# Patient Record
Sex: MALE | Race: WHITE | NOT HISPANIC OR LATINO | Employment: FULL TIME | URBAN - METROPOLITAN AREA
[De-identification: names, ages, dates, MRNs, and addresses within clinical notes are randomized per-mention and may not be internally consistent; named-entity substitution may affect disease eponyms.]

---

## 2017-07-05 ENCOUNTER — TRANSFERRED RECORDS (OUTPATIENT)
Dept: HEALTH INFORMATION MANAGEMENT | Facility: CLINIC | Age: 32
End: 2017-07-05

## 2017-09-08 ASSESSMENT — ENCOUNTER SYMPTOMS
JOINT SWELLING: 0
BACK PAIN: 0
MUSCLE WEAKNESS: 0
MUSCLE CRAMPS: 0
STIFFNESS: 0
MYALGIAS: 0
NECK PAIN: 0
POOR WOUND HEALING: 0
NAIL CHANGES: 1
ARTHRALGIAS: 1
SKIN CHANGES: 0

## 2017-09-19 ENCOUNTER — OFFICE VISIT (OUTPATIENT)
Dept: INTERNAL MEDICINE | Facility: CLINIC | Age: 32
End: 2017-09-19

## 2017-09-19 VITALS
SYSTOLIC BLOOD PRESSURE: 115 MMHG | HEART RATE: 59 BPM | BODY MASS INDEX: 23.64 KG/M2 | HEIGHT: 70 IN | WEIGHT: 165.1 LBS | DIASTOLIC BLOOD PRESSURE: 71 MMHG

## 2017-09-19 DIAGNOSIS — B65.9 SCHISTOSOMA: ICD-10-CM

## 2017-09-19 DIAGNOSIS — M77.11 LATERAL EPICONDYLITIS OF RIGHT ELBOW: ICD-10-CM

## 2017-09-19 DIAGNOSIS — Z00.00 PREVENTATIVE HEALTH CARE: Primary | ICD-10-CM

## 2017-09-19 DIAGNOSIS — B00.1 RECURRENT COLD SORES: ICD-10-CM

## 2017-09-19 DIAGNOSIS — Z00.00 PREVENTATIVE HEALTH CARE: ICD-10-CM

## 2017-09-19 LAB
ALBUMIN SERPL-MCNC: 4.4 G/DL (ref 3.4–5)
ALP SERPL-CCNC: 71 U/L (ref 40–150)
ALT SERPL W P-5'-P-CCNC: 37 U/L (ref 0–70)
ANION GAP SERPL CALCULATED.3IONS-SCNC: 8 MMOL/L (ref 3–14)
AST SERPL W P-5'-P-CCNC: 24 U/L (ref 0–45)
BILIRUB SERPL-MCNC: 0.9 MG/DL (ref 0.2–1.3)
BUN SERPL-MCNC: 26 MG/DL (ref 7–30)
CALCIUM SERPL-MCNC: 8.8 MG/DL (ref 8.5–10.1)
CHLORIDE SERPL-SCNC: 104 MMOL/L (ref 94–109)
CO2 SERPL-SCNC: 27 MMOL/L (ref 20–32)
CREAT SERPL-MCNC: 1.25 MG/DL (ref 0.66–1.25)
ERYTHROCYTE [DISTWIDTH] IN BLOOD BY AUTOMATED COUNT: 12.5 % (ref 10–15)
GFR SERPL CREATININE-BSD FRML MDRD: 66 ML/MIN/1.7M2
GLUCOSE SERPL-MCNC: 103 MG/DL (ref 70–99)
HCT VFR BLD AUTO: 40.4 % (ref 40–53)
HGB BLD-MCNC: 14.2 G/DL (ref 13.3–17.7)
MCH RBC QN AUTO: 31.1 PG (ref 26.5–33)
MCHC RBC AUTO-ENTMCNC: 35.1 G/DL (ref 31.5–36.5)
MCV RBC AUTO: 88 FL (ref 78–100)
PLATELET # BLD AUTO: 166 10E9/L (ref 150–450)
POTASSIUM SERPL-SCNC: 3.7 MMOL/L (ref 3.4–5.3)
PROT SERPL-MCNC: 8.1 G/DL (ref 6.8–8.8)
RBC # BLD AUTO: 4.57 10E12/L (ref 4.4–5.9)
SODIUM SERPL-SCNC: 139 MMOL/L (ref 133–144)
TSH SERPL DL<=0.005 MIU/L-ACNC: 1.29 MU/L (ref 0.4–4)
WBC # BLD AUTO: 7.4 10E9/L (ref 4–11)

## 2017-09-19 RX ORDER — PRAZIQUANTEL 600 MG/1
1200 TABLET, FILM COATED ORAL 3 TIMES DAILY
Qty: 6 TABLET | Refills: 0 | Status: SHIPPED | OUTPATIENT
Start: 2017-09-19 | End: 2017-09-20

## 2017-09-19 RX ORDER — VALACYCLOVIR HYDROCHLORIDE 1 G/1
TABLET, FILM COATED ORAL
Qty: 16 TABLET | Refills: 0 | Status: SHIPPED | OUTPATIENT
Start: 2017-09-19 | End: 2018-05-22

## 2017-09-19 ASSESSMENT — PAIN SCALES - GENERAL: PAINLEVEL: NO PAIN (1)

## 2017-09-19 NOTE — PATIENT INSTRUCTIONS
[facility]: [phone]     [facility] Medication Refill Request Information:  * Please contact your pharmacy regarding ANY request for medication refills.  ** [facility] Prescription Fax = [phone]  * Please allow 3 business days for routine medication refills.  * Please allow 5 business days for controlled substance medication refills.     [facility] Test Result notification information:  *You will be notified with in 7-10 days of your appointment day regarding the results of your test.  If you are on MyChart you will be notified as soon as the provider has reviewed the results and signed off on them.

## 2017-09-19 NOTE — MR AVS SNAPSHOT
After Visit Summary   9/19/2017    Justo Cardoza    MRN: [id number]           Patient Information     Date Of Birth          1985        Visit Information        Provider Department      9/19/2017 1:05 PM Ashlyn Mcdowell MD [facility]        Today's Diagnoses     Preventative health care    -  1    Schistosoma        Lateral epicondylitis of right elbow        Recurrent cold sores          Care Instructions    Primary Care Center: [phone]     Primary Care Center Medication Refill Request Information:  * Please contact your pharmacy regarding ANY request for medication refills.  ** [facility] Prescription Fax = [phone]  * Please allow 3 business days for routine medication refills.  * Please allow 5 business days for controlled substance medication refills.     Primary Care Center Test Result notification information:  *You will be notified with in 7-10 days of your appointment day regarding the results of your test.  If you are on MyChart you will be notified as soon as the provider has reviewed the results and signed off on them.            Follow-ups after your visit        Additional Services     PHYSICAL THERAPY REFERRAL       *This therapy referral will be filtered to a centralized scheduling office at [facility] and the patient will receive a call to schedule an appointment at a [place] location most convenient for them. *     [facility] provides Physical Therapy evaluation and treatment and many specialty services across the [place] system.  If requesting a specialty program, please choose from the list below.    If you have not heard from the scheduling office within 2 business days, please call [phone] for all locations, with the exception of [place], please call [phone].  Treatment: Evaluation & Treatment  Special Instructions/Modalities: physical therapy for tennis elbow.   Special Programs: none    Please  "be aware that coverage of these services is subject to the terms and limitations of your health insurance plan.  Call member services at your health plan with any benefit or coverage questions.      **Note to Provider:  If you are referring outside of [place] for the therapy appointment, please list the name of the location in the \"special instructions\" above, print the referral and give to the patient to schedule the appointment.                  Your next 10 appointments already scheduled     Sep 19, 2017  2:45 PM CDT   LAB with  LAB   [facility] ([facility])    [address] [place] [id number] [phone]           Patient must bring picture ID. Patient should be prepared to give a urine specimen  Please do not eat 10-12 hours before your appointment if you are coming in fasting for labs on lipids, cholesterol, or glucose (sugar). Pregnant women should follow their Care Team instructions. Water with medications is okay. Do not drink coffee or other fluids. If you have concerns about taking  your medications, please ask at office or if scheduling via [employer], send a message by clicking on Secure Messaging, Message Your Care Team.              Future tests that were ordered for you today     Open Future Orders        Priority Expected Expires Ordered    CBC with platelets Routine 9/19/2017 10/3/2017 9/19/2017    Comprehensive metabolic panel Routine 9/19/2017 10/3/2017 9/19/2017    TSH with free T4 reflex Routine 9/19/2017 10/3/2017 9/19/2017            Who to contact     Please call your clinic at [phone] to:    Ask questions about your health    Make or cancel appointments    Discuss your medicines    Learn about your test results    Speak to your doctor   If you have compliments or concerns about an experience at your clinic, or if you wish to file a complaint, please contact [facility] Patient Relations at [phone] or " "email us at [email or web address]         Additional Information About Your Visit        [facility] Information     YourTeamOnline gives you secure access to your electronic health record. If you see a primary care provider, you can also send messages to your care team and make appointments. If you have questions, please call your primary care clinic.  If you do not have a primary care provider, please call [phone] and they will assist you.      YourTeamOnline is an electronic gateway that provides easy, online access to your medical records. With YourTeamOnline, you can request a clinic appointment, read your test results, renew a prescription or communicate with your care team.     To access your existing account, please contact your [facility] or call [phone] for assistance.        Care EveryWhere ID     This is your Care EveryWhere ID. This could be used by other organizations to access your [place] medical records  [id number]        Your Vitals Were     Pulse Height BMI (Body Mass Index)             59 1.778 m (5' 10\") 23.69 kg/m2          Blood Pressure from Last 3 Encounters:   09/19/17 115/71    Weight from Last 3 Encounters:   09/19/17 74.9 kg (165 lb 1.6 oz)   04/11/16 80.7 kg (178 lb)   07/29/15 81.6 kg (180 lb)              We Performed the Following     PHYSICAL THERAPY REFERRAL          Today's Medication Changes          These changes are accurate as of: 9/19/17  2:32 PM.  If you have any questions, ask your nurse or doctor.               Start taking these medicines.        Dose/Directions    praziquantel 600 MG tablet   Commonly known as:  BILTRICIDE   Used for:  Schistosoma   Started by:  Ashlyn Mcdowell MD        Dose:  1200 mg   Take 2 tablets (1,200 mg) by mouth 3 times daily for 1 day   Quantity:  6 tablet   Refills:  0       valACYclovir 1000 mg tablet   Commonly known as:  VALTREX   Used for:  Recurrent cold sores   Started by:  Ashlyn Mcdowell" MD        Take 2 tabs by mouth when symptoms of cold sores start, and 2 tabs 12 hours later, for total of 1 day of treatment.   Quantity:  16 tablet   Refills:  0            Where to get your medicines      These medications were sent to [place], [place] - [address]  [address], [facility] [address]    Hours:  TRANSPLANT PHONE NUMBER [phone] Phone:  [phone]     praziquantel 600 MG tablet    valACYclovir 1000 mg tablet                Primary Care Provider    None       No address on file        Equal Access to Services     [facility]: Hadii jess ku hadasho Soomaali, waaxda luqadaha, qaybta kaalmada adecharlesyajian, alexus soni . So [facility] [phone].    ATENCIÓN: Si habla español, tiene a ruiz disposición servicios gratuitos de asistencia lingüística. Llame al [phone].    We comply with applicable federal civil rights laws and [place] laws. We do not discriminate on the basis of race, color, national origin, age, disability sex, sexual orientation or gender identity.            Thank you!     Thank you for choosing [facility]  for your care. Our goal is always to provide you with excellent care. Hearing back from our patients is one way we can continue to improve our services. Please take a few minutes to complete the written survey that you may receive in the mail after your visit with us. Thank you!             Your Updated Medication List - Protect others around you: Learn how to safely use, store and throw away your medicines at www.disposemymeds.org.          This list is accurate as of: 9/19/17  2:32 PM.  Always use your most recent med list.                   Brand Name Dispense Instructions for use Diagnosis    praziquantel 600 MG tablet    BILTRICIDE    6 tablet    Take 2 tablets (1,200 mg) by mouth 3 times daily for 1 day    Schistosoma       SEROQUEL PO       Right ankle pain        valACYclovir 1000 mg tablet    VALTREX    16 tablet    Take 2 tabs by mouth when symptoms of cold sores start, and 2 tabs 12 hours later, for total of 1 day of treatment.    Recurrent cold sores

## 2017-09-19 NOTE — NURSING NOTE
Chief Complaint   Patient presents with     Establish Care     pt here to establish care     Toenail     pt states having a spot on the left big toe nail     Derm Problem     pt states his face is peeling     Medication Request     pt would like to discuss medications     Kanika Ojeda CMA at 1:04 PM on 9/19/2017.

## 2017-09-19 NOTE — PROGRESS NOTES
PRIMARY CARE CENTER         HPI:       Justo Cardoza is a 32 year old male who presents for the following  Patient presents with: Establish Care (pt here to establish care); Toenail (pt states having a spot on the left big toe nail); Derm Problem (pt states his face is peeling); and Medication Request (pt would like to discuss medications)    Justo presents to establish care, and for multiple complaints- including lesion on his toe, exposure to schistosoma while swimming in [place], dry flaky skin on face, elbow pain, and med refill for cold sores.     The toe lesion is on his left big toe and he noticed it about 1.5 weeks ago. It is dark, 1cm in size, and has grown out with the nail. It's non-tender and non-palpable, and he does not remember any trauma to the area.     Regarding his travel, he has been to [facility] for research many times over the past decade, and he swam in [facility]. In the past he has always gotten prophylactic treatment in [place] for schistosomiasis, however the pharmacy had run out of the medication so he was hoping to get it here. He has no urinary symptoms o GI symptoms at all- no abodminal pain, diarrhea, changes in BMs, blood in stool, blood in urine, pain with urination or frequency.     He also complains of dry skin on the sides of his nose which is flaky for the past couple of months, with some redness on the base especially after sleep. He does not have any dandruff, uses head and shoulders shampoo but only on his hair not on face. He has been using a lotion for that region with some relief of flaking. No pain in that region.     He complains of pain in his right elbow which is worse when he plays squash and tennis. He has tried ice and heat occasionally, with  Moderate relief. He also bought a brace which does not seem to be helping. The pain does not radiate, seems to be made worse with certain movements. He stopped playing squash and tennis for the time being because they seem to  "aggravate his pain the most, but still plays basketball. Location is the lateral aspect of the elbow.     He also has cold sores and usually uses a cream, and was hoping to get a new prescription as his old one ran out. The cold sores occur twice a year on average, and are triggered by stress.     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     Surgical history includes hydrocele removal, appendectomy, hernia repair (right inguinal)     Family history: neg for diabetes. Heart disease in paternal grandfather, prostate cancer in paternal grandfather, hypertension in mother, and skin cancer in some relatives.     Problem, Medication and Allergy Lists were reviewed and are current.  Patient is a new patient to this clinic and so  I reviewed/updated the Past Medical History, the Family History and the Social History.          Review of Systems:   ROS  I have personally reviewed and updated the complete ROS on the day of the visit. \  HPI has pertinent positives, and additional 10 point ROS is negative.           Physical Exam:   /71  Pulse 59  Ht 1.778 m (5' 10\")  Wt 74.9 kg (165 lb 1.6 oz)  BMI 23.69 kg/m2  Body mass index is 23.69 kg/(m^2).  Vitals were reviewed       GENERAL APPEARANCE: healthy, alert and no distress     EYES: EOMI,  PERRL     NECK: no adenopathy, no asymmetry, masses, or scars and thyroid normal to palpation     RESP: lungs clear to auscultation - no rales, rhonchi or wheezes     CV: regular rates and rhythm, normal S1 S2, no S3 or S4 and no murmur, click or rub     ABDOMEN:  soft, nontender, no HSM or masses and bowel sounds normal     MS: extremities normal- no gross deformities noted, no evidence of inflammation in joints, FROM in all extremities. Tenderness on extension of wrist against resistance in the lateral epicondyle region.      SKIN: dark, 1cm horizontal lesion on toenail of left big toe.     "  PSYCH: mentation appears normal. Decreased eye contact.     Assessment and Plan     Justo was seen today for establish care, toenail, derm problem and medication request.    Diagnoses and all orders for this visit:    Preventative health care  -     CBC with platelets; Future  -     Comprehensive metabolic panel; Future  -     TSH with free T4 reflex; Future    Schistosoma- patient understood that treatment without known or confirmed disease carries its own risks, and he requests treatment. He would have gotten it in [place] if the pharmacy had it in stock, and he got it multiple times with no side effects.   -     praziquantel (BILTRICIDE) 600 MG tablet; Take 2 tablets (1,200 mg) by mouth 3 times daily for 1 day    Lateral epicondylitis of right elbow- physical exam is consistent with tennis elbow, as is patient history.   -     PHYSICAL THERAPY REFERRAL  - Traction brace provided  - Recommended ice and heat, along with 7-10 days of ibuprofen 400mg BID.     Recurrent cold sores- no current symptoms of cold sores, but told patient there is more evidence for PO antiviral rather than topical, and patient would prefer to try that.   -     valACYclovir (VALTREX) 1000 mg tablet; Take 2 tabs by mouth when symptoms of cold sores start, and 2 tabs 12 hours later, for total of 1 day of treatment.    #Toenail hematoma- lesion on toe is growing with the toenail, and I counseled the patient that despite no obvious trauma, this is most consistent with a blood collection under the toenail and will grow out with growth of the nail. He took a photo of the nail and will monitor it's progress, I explained it should grow out in 2 months.   - continue to montor    #Dry skin on face- although it doesn't look very severe today, per the history this could be consistent with some mild seborrheic dermatitis.   - Recommended to use head and shoulders shampoo on face as well  - Continue to use moisturizing lotion but not one with an alcohol  base.     Options for treatment and follow-up care were reviewed with the patient. Justo Cardoza engaged in the decision making process and verbalized understanding of the options discussed and agreed with the final plan.    Ashlyn Mcdowell MD  Sep 19, 2017    Pt was seen and plan of care discussed with Dr. Pimetnel    Attending Addendum:  Patient seen and examined with resident in clinic today.  Pertinent portions of history and exam were independently verified by myself.  I agree with the exam and plan as outlined above with the following modifications: none  Emilee Pimentel MD  Internal Medicine

## 2017-09-27 ENCOUNTER — THERAPY VISIT (OUTPATIENT)
Dept: PHYSICAL THERAPY | Facility: CLINIC | Age: 32
End: 2017-09-27
Payer: COMMERCIAL

## 2017-09-27 DIAGNOSIS — M77.11 LATERAL EPICONDYLITIS OF RIGHT ELBOW: Primary | ICD-10-CM

## 2017-09-27 PROCEDURE — 97110 THERAPEUTIC EXERCISES: CPT | Mod: GP | Performed by: PHYSICAL THERAPIST

## 2017-09-27 PROCEDURE — 97161 PT EVAL LOW COMPLEX 20 MIN: CPT | Mod: GP | Performed by: PHYSICAL THERAPIST

## 2017-09-27 NOTE — MR AVS SNAPSHOT
After Visit Summary   9/27/2017    Justo Cardoza    MRN: [id number]           Patient Information     Date Of Birth          1985        Visit Information        Provider Department      9/27/2017 9:30 AM Gita Jenkins, PT [facility]        Today's Diagnoses     Lateral epicondylitis of right elbow    -  1       Follow-ups after your visit        Your next 10 appointments already scheduled     Oct 04, 2017  4:10 PM CDT   ERIK Extremity with Chavez Xie PT   [facility] ([facility])    [address] [place] [id number] [phone]            Oct 11, 2017 11:00 AM CDT   ERIK Extremity with Chavez Xie PT   [facility] ([facility])    [address] [place] [id number] [phone]              Who to contact     If you have questions or need follow up information about today's clinic visit or your schedule please contact [facility] [facility] directly at [phone].  Normal or non-critical lab and imaging results will be communicated to you by [employer], letter or phone within 4 business days after the clinic has received the results. If you do not hear from us within 7 days, please contact the clinic through [employer] or phone. If you have a critical or abnormal lab result, we will notify you by phone as soon as possible.  Submit refill requests through tradeNOW or call your pharmacy and they will forward the refill request to us. Please allow 3 business days for your refill to be completed.          Additional Information About Your Visit        [employer] Information     tradeNOW gives you secure access to your electronic health record. If you see a primary care provider, you can also send messages to your care team and make appointments. If you have questions, please call your primary care clinic.  If you do not have  a primary care provider, please call [phone] and they will assist you.        Care EveryWhere ID     This is your Care EveryWhere ID. This could be used by other organizations to access your [place] medical records  [id number]         Blood Pressure from Last 3 Encounters:   09/19/17 115/71    Weight from Last 3 Encounters:   09/19/17 74.9 kg (165 lb 1.6 oz)   04/11/16 80.7 kg (178 lb)   07/29/15 81.6 kg (180 lb)              We Performed the Following     HC PT EVAL, LOW COMPLEXITY     ERIK INITIAL EVAL REPORT     THERAPEUTIC EXERCISES        Primary Care Provider    None Specified       No primary provider on file.        Equal Access to Services     [facility] : Hadii jess Mcfarland, myriam rios, sarahi martin, alexus soni . So [facility] [phone].    ATENCIÓN: Si habla español, tiene a ruiz disposición servicios gratuitos de asistencia lingüística. Llame al [phone].    We comply with applicable federal civil rights laws and [place] laws. We do not discriminate on the basis of race, color, national origin, age, disability sex, sexual orientation or gender identity.            Thank you!     Thank you for choosing [place] [facility] [place]  for your care. Our goal is always to provide you with excellent care. Hearing back from our patients is one way we can continue to improve our services. Please take a few minutes to complete the written survey that you may receive in the mail after your visit with us. Thank you!             Your Updated Medication List - Protect others around you: Learn how to safely use, store and throw away your medicines at www.disposemymeds.org.          This list is accurate as of: 9/27/17 11:17 AM.  Always use your most recent med list.                   Brand Name Dispense Instructions for use Diagnosis    SEROQUEL PO       Right ankle pain       valACYclovir 1000 mg tablet    VALTREX    16 tablet    Take  2 tabs by mouth when symptoms of cold sores start, and 2 tabs 12 hours later, for total of 1 day of treatment.    Recurrent cold sores

## 2017-09-27 NOTE — PROGRESS NOTES
Subjective:    Patient is a 32 year old male presenting with rehab right elbow hpi.   Justo Cardoza is a 32 year old male with a right elbow condition.  Condition occurred with:  Repetition/overuse.  Condition occurred: during recreation/sport.  This is a chronic condition  May 2017 - onset of right elbow pain after playing tennis and squash. Has been taking it easy from that since it started. Still tries to play basketball and lift but that can sometimes hurt. Has tried elbow braces but do not like them as they restrict his elbow flexion.    Patient reports pain:  Lateral epicondyle.  Radiates to: none.  Pain is described as stabbing and is intermittent and reported as 7/10 (at worst).  Associated symptoms:  Loss of strength (in wrist). Pain is the same all the time.  Symptoms are exacerbated by other (playing tennis/squash, lifting bag out to the side with elbow extended, basketball, wrist extension) and relieved by NSAID's and other (wearing compression sleeve).  Since onset symptoms are gradually improving (but fluctuates).  Special testing: none.  Previous treatment: none.  Improvement with previous treatment: NA.  General health as reported by patient is excellent.                                              Objective:    Standing Alignment:    Cervical/Thoracic:  Forward head  Shoulder/UE:  Rounded shoulders (poor sitting posture)                  Flexibility/Screens:     Upper Extremity:        Decreased right upper extremity flexibility present at:  Wrist Extensors                           Shoulder Evaluation:  ROM:  AROM:    Flexion:  Left:  WNL    Right:  WNL    Abduction:  Left: WNL   Right:  WNL      External Rotation:  Left:  WNL    Right:  WNL            Extension/Internal Rotation:  Left:  T5    Right:  T7          Strength:  : scaption 5/5. ER at 90/90: L 5/5, R 5-/5. IR at 90/90 5/5 bilat. Mid Trap: L 5-/5, R 4+/5. Low Trap: L 4+/5, R 4/5.  Flexion: Left:5/5   Pain:    Right: 5/5     Pain:      Abduction:  Left: 5/5  Pain:    Right: 5/5     Pain:    Internal Rotation:  Left:5/5     Pain:    Right: 5/5     Pain:  External Rotation:   Left:5/5     Pain:   Right:5-/5     Pain:                       ROM:          Strength:    Flexion Elbow:  Left: 5/5 Pain:    Right: 5/5 Pain:  Extension Elbow: Left: 5/5 Pain:    Right: 5/5 Pain:  Flexion Wrist:  Left: 5/5 Pain:    Right: 5/5 Pain:  Extension Wrist: Left: 5/5 Pain:  Right: 4+/5  Pain:++  Supination Elbow/Wrist: Left: 5/5 Pain:    Right: 5/5 Pain:  Pronation Elbow/Wrist: Left: 5/5 Pain:        Right: 5/5 Pain:  Radial Deviation:  Left: 5/5 Pain:    Right: 5-/5  Pain:-  Ulnar Deviation: Left: 5/5 Pain:    Right: 5/5 Pain:    Special Testing:      Right wrist/elbow positive for the following special tests: Lateral Epicondylitis  Palpation:  Palpation elbow/wrist: tenderness at proximal wrist extensor wad and tendon unit, down into mid portion of extensors.    Right wrist/elbow tenderness present at:Wrist ExtensorsRight wrist/elbow tenderness not present at:Lateral Epicondyle                                General     ROS    Assessment/Plan:      Patient is a 32 year old male with left side elbow complaints.    Patient has the following significant findings with corresponding treatment plan.                Diagnosis 1:  Right lateral epicondylitis    Pain -  hot/cold therapy, manual therapy, self management, education and home program  Decreased ROM/flexibility - manual therapy, therapeutic exercise and home program  Decreased strength - therapeutic exercise, therapeutic activities and home program  Impaired muscle performance - neuro re-education and home program  Decreased function - therapeutic activities and home program  Impaired posture - neuro re-education, therapeutic activities and home program    Therapy Evaluation Codes:   1) History comprised of:   Personal factors that impact the plan of care:      None.    Comorbidity factors that impact the plan  of care are:      None.     Medications impacting care: None.  2) Examination of Body Systems comprised of:   Body structures and functions that impact the plan of care:      Elbow.   Activity limitations that impact the plan of care are:      Lifting and Sports.  3) Clinical presentation characteristics are:   Stable/Uncomplicated.  4) Decision-Making    Low complexity using standardized patient assessment instrument and/or measureable assessment of functional outcome.  Cumulative Therapy Evaluation is: Low complexity.    Previous and current functional limitations:  (See Goal Flow Sheet for this information)    Short term and Long term goals: (See Goal Flow Sheet for this information)     Communication ability:  Patient appears to be able to clearly communicate and understand verbal and written communication and follow directions correctly.  Treatment Explanation - The following has been discussed with the patient:   RX ordered/plan of care  Anticipated outcomes  Possible risks and side effects  This patient would benefit from PT intervention to resume normal activities.   Rehab potential is good.    Frequency:  1 X week, once daily  Duration:  for 4 weeks tapering to 2 X a month over 8 weeks  Discharge Plan:  Achieve all LTG.  Independent in home treatment program.  Reach maximal therapeutic benefit.    Please refer to the daily flowsheet for treatment today, total treatment time and time spent performing 1:1 timed codes.

## 2017-09-28 NOTE — PROGRESS NOTES
Subjective:    Patient is a 32 year old male presenting with rehab left ankle/foot hpi.                    and reported as 2/10.                General health as reported by patient is excellent.  Pertinent medical history includes:  Mental illness.    Surgical history: Appendectomy, Hernia.    Current occupation is Professor .    Primary job tasks include:  Prolonged sitting (computer work).                                Objective:    System    Physical Exam    General     ROS    Assessment/Plan:

## 2017-10-04 ENCOUNTER — THERAPY VISIT (OUTPATIENT)
Dept: PHYSICAL THERAPY | Facility: CLINIC | Age: 32
End: 2017-10-04
Payer: COMMERCIAL

## 2017-10-04 DIAGNOSIS — M77.11 LATERAL EPICONDYLITIS OF RIGHT ELBOW: ICD-10-CM

## 2017-10-04 PROCEDURE — 97140 MANUAL THERAPY 1/> REGIONS: CPT | Mod: GP | Performed by: PHYSICAL THERAPIST

## 2017-10-04 PROCEDURE — 97112 NEUROMUSCULAR REEDUCATION: CPT | Mod: GP | Performed by: PHYSICAL THERAPIST

## 2017-10-04 PROCEDURE — 97110 THERAPEUTIC EXERCISES: CPT | Mod: GP | Performed by: PHYSICAL THERAPIST

## 2017-10-11 ENCOUNTER — THERAPY VISIT (OUTPATIENT)
Dept: PHYSICAL THERAPY | Facility: CLINIC | Age: 32
End: 2017-10-11
Payer: COMMERCIAL

## 2017-10-11 DIAGNOSIS — M77.11 LATERAL EPICONDYLITIS OF RIGHT ELBOW: ICD-10-CM

## 2017-10-11 PROCEDURE — 97035 APP MDLTY 1+ULTRASOUND EA 15: CPT | Mod: GP | Performed by: PHYSICAL THERAPIST

## 2017-10-11 PROCEDURE — 97110 THERAPEUTIC EXERCISES: CPT | Mod: GP | Performed by: PHYSICAL THERAPIST

## 2017-10-11 PROCEDURE — 97140 MANUAL THERAPY 1/> REGIONS: CPT | Mod: GP | Performed by: PHYSICAL THERAPIST

## 2017-10-25 ENCOUNTER — THERAPY VISIT (OUTPATIENT)
Dept: PHYSICAL THERAPY | Facility: CLINIC | Age: 32
End: 2017-10-25
Payer: COMMERCIAL

## 2017-10-25 DIAGNOSIS — M77.11 LATERAL EPICONDYLITIS OF RIGHT ELBOW: ICD-10-CM

## 2017-10-25 PROCEDURE — 97110 THERAPEUTIC EXERCISES: CPT | Mod: GP | Performed by: PHYSICAL THERAPIST

## 2017-10-25 NOTE — MR AVS SNAPSHOT
After Visit Summary   10/25/2017    Justo Cardoza    MRN: [id number]           Patient Information     Date Of Birth          1985        Visit Information        Provider Department      10/25/2017 3:30 PM Chavez Xie PT [facility] [employer] [facility]        Today's Diagnoses     Lateral epicondylitis of right elbow           Follow-ups after your visit        Who to contact     If you have questions or need follow up information about today's clinic visit or your schedule please contact [facility] [employer] directly at [phone].  Normal or non-critical lab and imaging results will be communicated to you by [employer], letter or phone within 4 business days after the clinic has received the results. If you do not hear from us within 7 days, please contact the clinic through [employer] or phone. If you have a critical or abnormal lab result, we will notify you by phone as soon as possible.  Submit refill requests through [employer] or call your pharmacy and they will forward the refill request to us. Please allow 3 business days for your refill to be completed.          Additional Information About Your Visit        MyChart Information     [employer] gives you secure access to your electronic health record. If you see a primary care provider, you can also send messages to your care team and make appointments. If you have questions, please call your primary care clinic.  If you do not have a primary care provider, please call [phone] and they will assist you.        Care EveryWhere ID     This is your Care EveryWhere ID. This could be used by other organizations to access your [facility] medical records  [id number]         Blood Pressure from Last 3 Encounters:   09/19/17 115/71    Weight from Last 3 Encounters:   09/19/17 74.9 kg (165 lb 1.6 oz)   04/11/16 80.7 kg (178 lb)   07/29/15 81.6 kg (180 lb)              We Performed the Following     THERAPEUTIC  EXERCISES        Primary Care Provider Office Phone # Fax #    Emilee Pimentel [phone]       [address] [place] [address] 741  [facility] [id number]        Equal Access to Services     MARCIO CAI : Hadnickie valenzuela yiselo Sosoloali, waaxda luqadaha, qaybta kaalmada adecarlos, alexus johnson laCheyannesathya latif. So [facility] [phone].    ATENCIÓN: Si habla español, tiene a ruiz disposición servicios gratuitos de asistencia lingüística. [facility] [phone].    We comply with applicable federal civil rights laws and [place] laws. We do not discriminate on the basis of race, color, national origin, age, disability, sex, sexual orientation, or gender identity.            Thank you!     Thank you for choosing [facility]  for your care. Our goal is always to provide you with excellent care. Hearing back from our patients is one way we can continue to improve our services. Please take a few minutes to complete the written survey that you may receive in the mail after your visit with us. Thank you!             Your Updated Medication List - Protect others around you: Learn how to safely use, store and throw away your medicines at www.disposemymeds.org.          This list is accurate as of: 10/25/17  4:11 PM.  Always use your most recent med list.                   Brand Name Dispense Instructions for use Diagnosis    SEROQUEL PO       Right ankle pain       valACYclovir 1000 mg tablet    VALTREX    16 tablet    Take 2 tabs by mouth when symptoms of cold sores start, and 2 tabs 12 hours later, for total of 1 day of treatment.    Recurrent cold sores

## 2018-01-22 ENCOUNTER — OFFICE VISIT (OUTPATIENT)
Dept: ORTHOPEDICS | Facility: CLINIC | Age: 33
End: 2018-01-22
Payer: COMMERCIAL

## 2018-01-22 VITALS
WEIGHT: 174.8 LBS | BODY MASS INDEX: 25.03 KG/M2 | SYSTOLIC BLOOD PRESSURE: 130 MMHG | DIASTOLIC BLOOD PRESSURE: 73 MMHG | HEIGHT: 70 IN

## 2018-01-22 DIAGNOSIS — M76.52 PATELLAR TENDINITIS OF LEFT KNEE: Primary | ICD-10-CM

## 2018-01-22 NOTE — MR AVS SNAPSHOT
After Visit Summary   1/22/2018    Justo Cardoza    MRN: [id number]           Patient Information     Date Of Birth          1985        Visit Information        Provider Department      1/22/2018 11:40 AM David Marina MD [facility]        Care Instructions              Follow-ups after your visit        Your next 10 appointments already scheduled     Feb 13, 2018  3:05 PM CST   (Arrive by 2:50 PM)   Return Visit with Ashlyn Mcdowell MD   [facility] ([facility])    [address] [place] [id number] [phone]              Who to contact     Please call your clinic at [phone] to:    Ask questions about your health    Make or cancel appointments    Discuss your medicines    Learn about your test results    Speak to your doctor   If you have compliments or concerns about an experience at your clinic, or if you wish to file a complaint, please contact [facility] Patient Relations at [phone] or email us at [email or web address]         Additional Information About Your Visit        MyChart Information     [employer] gives you secure access to your electronic health record. If you see a primary care provider, you can also send messages to your care team and make appointments. If you have questions, please call your primary care clinic.  If you do not have a primary care provider, please call [phone] and they will assist you.      [employer] is an electronic gateway that provides easy, online access to your medical records. With [employer], you can request a clinic appointment, read your test results, renew a prescription or communicate with your care team.     To access your existing account, please contact your [facility] or call [phone] for assistance.        Care EveryWhere ID     This is your Care  "EveryWhere ID. This could be used by other organizations to access your [facility] medical records  [id number]        Your Vitals Were     Height BMI (Body Mass Index)                5' 9.8\" (1.773 m) 25.22 kg/m2           Blood Pressure from Last 3 Encounters:   01/22/18 130/73   09/19/17 115/71    Weight from Last 3 Encounters:   01/22/18 174 lb 12.8 oz (79.3 kg)   09/19/17 165 lb 1.6 oz (74.9 kg)   04/11/16 178 lb (80.7 kg)              Today, you had the following     No orders found for display       Primary Care Provider Office Phone # Fax #    Emilee Pimentel [phone]       [address] [place] [address] [address] [place] [address]        Equal Access to Services     MARCIO CAI : Hadii jess morilloo Sobrian, waaxda luqadaha, qaybta kaalmada adecarlos, alexus soni . So [facility] [phone].    ATENCIÓN: Si habla español, tiene a ruiz disposición servicios gratuitos de asistencia lingüística. Bre al [phone].    We comply with applicable federal civil rights laws and [place] laws. We do not discriminate on the basis of race, color, national origin, age, disability, sex, sexual orientation, or gender identity.            Thank you!     Thank you for choosing [facility]  for your care. Our goal is always to provide you with excellent care. Hearing back from our patients is one way we can continue to improve our services. Please take a few minutes to complete the written survey that you may receive in the mail after your visit with us. Thank you!             Your Updated Medication List - Protect others around you: Learn how to safely use, store and throw away your medicines at www.disposemymeds.org.          This list is accurate as of: 1/22/18 12:25 PM.  Always use your most recent med list.                   Brand Name Dispense Instructions for use Diagnosis    SEROQUEL PO       Right ankle pain       valACYclovir 1000 mg tablet "    VALTREX    16 tablet    Take 2 tabs by mouth when symptoms of cold sores start, and 2 tabs 12 hours later, for total of 1 day of treatment.    Recurrent cold sores

## 2018-01-22 NOTE — PROGRESS NOTES
NEW PATIENT INTAKE QUESTIONNAIRE  SPORTS & ORTHOPEDIC WALK-IN 1/22/2018    Primary Care Physician: Dr. Emilee Pimentel    [facility]    Reason for Visit:    What part of your body is injured / painful?  left knee    What caused the injury /pain? Just started hurting one day    How long ago did your injury occur or pain begin? several weeks ago    What are your most bothersome symptoms? Pain    How would you characterize your symptom? sharp    What makes your symptoms better? Other: stop the bending motion    What makes your symptoms worse? Bending, getting out of cars, squatting     Have you been previously seen for this problem? No    Medical History:    Medical History: None    Have you had surgery on this body part before? No    Medications: Seroquel    Allergies: No known drug allergies    Family History of Medical Problems: None    Previous Surgeries: Appendectomy in 2000, Hernia repair in 1993, and hydoclectomy in testicle in 2008.       Social History:    Occupation: Professor at the [employer]    Handedness: Right    Exercise: Cardiovascular: Running Days per week: 6    Elliptical, Play basketball, and resistance training    Review of Systems:    Have you recently had a a fever, chills, weight loss? No    Do you have any vision problems? No    Do you have any chest pain or edema? No    Do you have any shortness of breath or wheezing?  No    Do you have stomach problems? No    Do you have any numbness or focal weakness? No    Do you have diabetes? No    Do you have problems with bleeding or clotting? No    Do you have an rashes or other skin lesions? No    Communication:    How did you hear about us? The  informed me about the Walk-In Clinic, billboard    Who else should know about this visit? Other: None

## 2018-01-23 NOTE — PROGRESS NOTES
"[facility] Sports and Orthopedic Walk-in Clinic Note      Patient is a 32 year old male who presents to the office today for: Left knee pain  Pain began several weeks ago.  Localizes to the anterior knee.  Worse with deep knee bending and jumping.  Let us play basketball recreationally 1-2 times per week however this seems to be aggravating the pain.  Has avoided basketball for the last week the pain does seem to be better.  No specific trauma or injury.  No prior history of knee pain or injury.  No pain at rest or when walking.  No tingling numbness or weakness.  No clicking locking or catching.      Past Medical History, Current Medications, and Allergies are reviewed in the electronic medical record as appropriate.     ROS: Pertinent items are noted in HPI.  Constitutional: negative for fevers, chills and malaise  Cardiovascular: negative for dyspnea, fatigue, lower extremity edema  Integument/breast: negative for rash, skin lesion(s) and skin color change  Neurological: negative for paresthesia and weakness      EXAM:/73  Ht 5' 9.8\" (1.773 m)  Wt 174 lb 12.8 oz (79.3 kg)  BMI 25.22 kg/m2    Patient is alert, No acute distress, pleasant and conversational.    Gait: nonantalgic. Normal heel toe gait.    Patient is able to perform two legged squat without difficulty. But has some pain with single leg squat on left    left knee:   Skin intact. No erythema or ecchymosis.  No effusion or soft tissue swelling.    AROM: Zero to approximately 135  without restriction or reported pain.    Palpation:   Very mild tenderness to firm palpation of patellar tendon at inferior pole  No medial or lateral facet joint tenderness.  No posterior medial or posterior lateral joint line tenderness     Special Tests:  Negative bounce test, negative forced flexion and negative Kelly's.  No ligamentous laxity or pain with valgus or varus stress.  Negative Lachman's, Anterior Drawer and Posterior Drawer     Full Isometric quad " strength, extensor mechanism in place     Neurovascularly intact in the lower extremity    Hip and Ankle with full AROM and nontender      Radiographs: none      Assessment: Patient is a 32 year old male with patellar tendinitis.     Recommendations:   Given HEP, will call if he would like PT.   Ice after activity  May use ibuprofen, naproxen for pain prn  Follow up in 6 weeks if not improving.     David Marina MD

## 2018-02-01 ENCOUNTER — OFFICE VISIT (OUTPATIENT)
Dept: ORTHOPEDICS | Facility: CLINIC | Age: 33
End: 2018-02-01
Payer: COMMERCIAL

## 2018-02-01 ENCOUNTER — RADIANT APPOINTMENT (OUTPATIENT)
Dept: GENERAL RADIOLOGY | Facility: CLINIC | Age: 33
End: 2018-02-01
Attending: PREVENTIVE MEDICINE
Payer: COMMERCIAL

## 2018-02-01 VITALS — BODY MASS INDEX: 24.91 KG/M2 | WEIGHT: 174 LBS | HEIGHT: 70 IN

## 2018-02-01 DIAGNOSIS — M79.642 LEFT HAND PAIN: Primary | ICD-10-CM

## 2018-02-01 DIAGNOSIS — S62.356A CLOSED NONDISPLACED FRACTURE OF SHAFT OF FIFTH METACARPAL BONE OF RIGHT HAND, INITIAL ENCOUNTER: ICD-10-CM

## 2018-02-01 DIAGNOSIS — M79.642 LEFT HAND PAIN: ICD-10-CM

## 2018-02-01 RX ORDER — TRAMADOL HYDROCHLORIDE 50 MG/1
50 TABLET ORAL EVERY 6 HOURS PRN
Qty: 5 TABLET | Refills: 0 | Status: SHIPPED | OUTPATIENT
Start: 2018-02-01 | End: 2018-02-13

## 2018-02-01 ASSESSMENT — PAIN SCALES - GENERAL: PAINLEVEL: EXTREME PAIN (8)

## 2018-02-01 NOTE — LETTER
2/1/2018       RE: Justo Cardoza  [address]   [place] [address]     Dear Colleague,    Thank you for referring your patient, Justo Cardoza, to the [facility] SPORTS AND ORTHOPAEDIC WALK IN CLINIC at [facility]. Please see a copy of my visit note below.          SPORTS & ORTHOPEDIC WALK-IN VISIT 2/1/2018    Pt states he was playing basketball and he jammed his hand trying to grab for a loose ball. Right after he jammed it he caught the basketball and realized how painful it actually was. Also states that he feels popping in his hand. Pt keeping hand elevated above his head and is in a lot of visible pain. Pain is located on lateral hand mid to proximal metacarpal.     Reason for visit:     What part of your body is injured / painful?  left hand    What caused the injury /pain? Recreational/competitive sports injury - Basketball    How long ago did your injury occur or pain begin? today    What are your most bothersome symptoms? Pain, Swelling and Clicking, popping    How would you characterize your symptom?  sharp and throbing    What makes your symptoms better? Other: Hasn't tried anything. Just happened    What makes your symptoms worse? Movement    Have you been previously seen for this problem? No    Medical History:    Any recent changes to your medical history? No    Any new medication prescribed since last visit? No    Have you had surgery on this body part before? 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 No    Do you have any vision problems? No    Do you have any chest pain or edema? No    Do you have any shortness of breath or wheezing?  No    Do you have stomach problems? No    Do you have any numbness or focal weakness? No    Do you have diabetes? No    Do you have problems with bleeding or clotting?  "No    Do you have an rashes or other skin lesions? Yes, cold sores       Agree with above. Dr Alvares    HISTORY OF PRESENT ILLNESS  Mr. Cardoza is a pleasant 32 year old year old male who presents to clinic today with left hand pain following an injury playing basketball about an hour ago  Justo explains that he thinks his hand may be broken  Location: left hand  Quality:  achy pain    Severity: 6/10 at worst    Duration: 1 hour+    Context: occurs while moving hand and wrist  Modifying factors:  resting and non-use makes it better, movement and use makes it worse  Associated signs & symptoms: swelling, no numbness or tingling  Previous similar pain: no  Additional history: as documented    MEDICAL HISTORY  Patient Active Problem List   Diagnosis     Right ankle pain       Current Outpatient Prescriptions   Medication Sig Dispense Refill     QUEtiapine Fumarate (SEROQUEL PO)        valACYclovir (VALTREX) 1000 mg tablet Take 2 tabs by mouth when symptoms of cold sores start, and 2 tabs 12 hours later, for total of 1 day of treatment. (Patient not taking: Reported on 2/1/2018) 16 tablet 0       No Known Allergies    No family history on file.    Additional medical/Social/Surgical histories reviewed in [email or web address] and updated as appropriate.     REVIEW OF SYSTEMS (2/13/2018)  10 point ROS of systems including Constitutional, Eyes, Respiratory, Cardiovascular, Gastroenterology, Genitourinary, Integumentary, Musculoskeletal, Psychiatric were all negative except for pertinent positives noted in my HPI.     PHYSICAL EXAM  Vitals:    02/01/18 1904   Weight: 78.9 kg (174 lb)   Height: 1.778 m (5' 10\")     Vital Signs: Ht 1.778 m (5' 10\")  Wt 78.9 kg (174 lb)  BMI 24.97 kg/m2 Patient declined being weighed. Body mass index is 24.97 kg/(m^2).    General  - normal appearance, in no obvious distress  CV  - normal radial pulse  Pulm  - normal respiratory pattern, non-labored  Musculoskeletal - left hand  - inspection: normal joint " alignment, no obvious deformity, swelling overlying 5th metacarpal  - palpation: no bony or soft tissue tenderness- except overlying 5th metacarpal, no tenderness at the anatomical snuffbox  - ROM: limited due to pain in hand  - strength: 5/5  strength, 5/5 flexion, extension, pronation, supination, adduction, abduction  - special tests:  (-) Tinel's  (-) Finkelstein  (-) Phalen  (-) Mccartney click test  (-) ulnar impaction  Neuro  - no sensory or motor deficit, grossly normal coordination, normal muscle tone  Skin  - no ecchymosis, erythema, warmth, or induration, no obvious rash  Psych  - interactive, appropriate, normal mood and affect    ASSESSMENT & PLAN  33 yo male with left hand 5th metacarpal fracture, acute, nondisplaced.  Splint placed  xrays reviewed: 5th metacarpal fracture can be managed non-operatively, will repeat xrays in 1 week and f/u in 1.5 weeks  Consider casting at that time.  Tylenol and ice PRN      Mark Anthony Alvares MD, CAQSM

## 2018-02-01 NOTE — MR AVS SNAPSHOT
After Visit Summary   2/1/2018    Justo Cardoza    MRN: [id number]           Patient Information     Date Of Birth          1985        Visit Information        Provider Department      2/1/2018 7:00 PM Mark Anthony Alvares MD [facility]        Today's Diagnoses     Left hand pain    -  1    Closed nondisplaced fracture of shaft of fifth metacarpal bone of right hand, initial encounter          Care Instructions      Cast Care     Supporting your cast with a provided a sling or crutches can help with healing.    Your healthcare provider just gave you a cast made of plaster or fiberglass. This cast will hold your arm or leg in place to help it heal. Though it might feel a bit awkward at first, you ll soon get used to it. During the coming days and weeks, the way you treat your cast can play a big part in how fast and how well you heal.  Keep the cast dry  If a plaster cast gets wet, it can soften and fall apart. And if the padding of a fiberglass cast gets wet, it can irritate and damage your skin. So your cast must stay dry.    Avoid activities that can get your cast wet. These include swimming, fishing, washing dishes, and even going out in the rain.    Bathe as directed by your healthcare provider. When you bathe, keep your cast out of water and wrapped in plastic.    Don t soak your cast in water, even if it s wrapped in plastic.    If your cast does get wet, try drying it as soon as possible. To do this, use a hair dryer set to cool. Call your healthcare provider if your cast doesn t dry within 24 hours.  Handle with care  For the best results, remember the following:  Do    Do keep the cast clean and dry. Cover it with plastic to protect it when around dirt or water.    Do use any support you are given, such as crutches or a sling.    Do elevate the cast above your heart whenever possible.  Don t    Don t slide anything inside the cast, even to scratch your  skin.    Don t put lotions or powders around the cast or inside it.    Don t hit the cast.    Don t cut the cast or pull it apart.    Don t wash the cast.  When to call your healthcare provider  Call your healthcare provider right away if you have any of the following:    Swelling or cast tightness that does not improve with elevation    If your cast breaks    If your cast gets wet and cannot be dried    If you have increasing pain, numbness, or tingling  Date Last Reviewed: 9/3/2015    2499-8594 [employer]. [address] [place] [address]. All rights reserved. This information is not intended as a substitute for professional medical care. Always follow your healthcare professional's instructions.                Follow-ups after your visit        Your next 10 appointments already scheduled     Feb 09, 2018  1:00 PM CST   (Arrive by 12:45 PM)   XR HAND LEFT G/E 3 VIEWS with UCORTHXR2   [facility] ([facility] [place])    [address] [place] [address] [phone]           Please bring a list of your current medicines to your exam. (Include vitamins, minerals and over-thecounter medicines.) Leave your valuables at home.  Tell your doctor if there is a chance you may be pregnant.  You do not need to do anything special for this exam.            Feb 13, 2018  1:00 PM CST   (Arrive by 12:45 PM)   Return Walk In Ortho with Mark Anthony Alvares MD   [facility] ([facility] [place])    [address] [place] [address] [phone]            Feb 13, 2018  3:05 PM CST   (Arrive by 2:50 PM)   Return Visit with Ashlyn Mcdowell MD   [facility] ([facility])    [address] [place] [address] [phone]              Future tests that were ordered for you today     Open Future Orders        Priority  "Expected Expires Ordered    XR Hand LT G/E 3 vw Routine 2/9/2018 3/3/2018 2/1/2018            Who to contact     Please call your clinic at [phone] to:    Ask questions about your health    Make or cancel appointments    Discuss your medicines    Learn about your test results    Speak to your doctor   If you have compliments or concerns about an experience at your clinic, or if you wish to file a complaint, please contact [facility] Patient Relations at [phone] or email us at [email or web address]         Additional Information About Your Visit        ObeoharMyCube Information     Ge.tt gives you secure access to your electronic health record. If you see a primary care provider, you can also send messages to your care team and make appointments. If you have questions, please call your primary care clinic.  If you do not have a primary care provider, please call [phone] and they will assist you.      Ge.tt is an electronic gateway that provides easy, online access to your medical records. With Ge.tt, you can request a clinic appointment, read your test results, renew a prescription or communicate with your care team.     To access your existing account, please contact your [facility] or call [phone] for assistance.        Care EveryWhere ID     This is your Care EveryWhere ID. This could be used by other organizations to access your [place] medical records  [id number]        Your Vitals Were     Height BMI (Body Mass Index)                5' 10\" (1.778 m) 24.97 kg/m2           Blood Pressure from Last 3 Encounters:   01/22/18 130/73   09/19/17 115/71    Weight from Last 3 Encounters:   02/01/18 174 lb (78.9 kg)   01/22/18 174 lb 12.8 oz (79.3 kg)   09/19/17 165 lb 1.6 oz (74.9 kg)                 Today's Medication Changes          These changes are accurate as of 2/1/18  7:47 PM.  If you have any questions, ask your nurse or " doctor.               Start taking these medicines.        Dose/Directions    traMADol 50 MG tablet   Commonly known as:  ULTRAM   Used for:  Left hand pain, Closed nondisplaced fracture of shaft of fifth metacarpal bone of right hand, initial encounter   Started by:  Mark Anthony Alvares MD        Dose:  50 mg   Take 1 tablet (50 mg) by mouth every 6 hours as needed for moderate pain   Quantity:  5 tablet   Refills:  0            Where to get your medicines      Some of these will need a paper prescription and others can be bought over the counter.  Ask your nurse if you have questions.     Bring a paper prescription for each of these medications     traMADol 50 MG tablet                Primary Care Provider Office Phone # Fax #    LostantLadonna Pimentel [phone]       [address] [place] [address]        Equal Access to Services     MARCIO CAI AH: Chantel greenwood Sobrian, waaxda luqadaha, qaybta kaalmada adecharlesyajian, alexus latif. So [facility] [phone].    ATENCIÓN: Si habla español, tiene a ruiz disposición servicios gratuitos de asistencia lingüística. Ll[facility] [phone].    We comply with applicable federal civil rights laws and [place] laws. We do not discriminate on the basis of race, color, national origin, age, disability, sex, sexual orientation, or gender identity.            Thank you!     Thank you for choosing [facility] SPORTS AND ORTHOPAEDIC WALK IN CLINIC  for your care. Our goal is always to provide you with excellent care. Hearing back from our patients is one way we can continue to improve our services. Please take a few minutes to complete the written survey that you may receive in the mail after your visit with us. Thank you!             Your Updated Medication List - Protect others around you: Learn how to safely use, store and throw away your medicines at www.disposemymeds.org.          This list is accurate as of 2/1/18   7:47 PM.  Always use your most recent med list.                   Brand Name Dispense Instructions for use Diagnosis    SEROQUEL PO       Right ankle pain       traMADol 50 MG tablet    ULTRAM    5 tablet    Take 1 tablet (50 mg) by mouth every 6 hours as needed for moderate pain    Left hand pain, Closed nondisplaced fracture of shaft of fifth metacarpal bone of right hand, initial encounter       valACYclovir 1000 mg tablet    VALTREX    16 tablet    Take 2 tabs by mouth when symptoms of cold sores start, and 2 tabs 12 hours later, for total of 1 day of treatment.    Recurrent cold sores

## 2018-02-02 NOTE — PROGRESS NOTES
SPORTS & ORTHOPEDIC WALK-IN VISIT 2/1/2018    Pt states he was playing basketball and he jammed his hand trying to grab for a loose ball. Right after he jammed it he caught the basketball and realized how painful it actually was. Also states that he feels popping in his hand. Pt keeping hand elevated above his head and is in a lot of visible pain. Pain is located on lateral hand mid to proximal metacarpal.     Reason for visit:     What part of your body is injured / painful?  left hand    What caused the injury /pain? Recreational/competitive sports injury - Basketball    How long ago did your injury occur or pain begin? today    What are your most bothersome symptoms? Pain, Swelling and Clicking, popping    How would you characterize your symptom?  sharp and throbing    What makes your symptoms better? Other: Hasn't tried anything. Just happened    What makes your symptoms worse? Movement    Have you been previously seen for this problem? No    Medical History:    Any recent changes to your medical history? No    Any new medication prescribed since last visit? No    Have you had surgery on this body part before? 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 No    Do you have any vision problems? No    Do you have any chest pain or edema? No    Do you have any shortness of breath or wheezing?  No    Do you have stomach problems? No    Do you have any numbness or focal weakness? No    Do you have diabetes? No    Do you have problems with bleeding or clotting? No    Do you have an rashes or other skin lesions? Yes, cold sores       Agree with above. Dr Alvares

## 2018-02-02 NOTE — NURSING NOTE
Relevant Diagnosis: Closed nondisplaced fracture of shaft of fifth metacarpal bone of right hand    Ulnar gutter splint application and care    Type of Cast applied: Ulnar gutter splint   Cast/Splinting material used: Orthoglass    Person(s) involved in teaching:   Patient     Motivation Level:  Asks Questions: Yes  Eager to Learn: Yes  Cooperative: Yes  Receptive (willing/able to accept information): Yes     Patient demonstrates understanding of the following:   Reason for the appointment, diagnosis and treatment plan: Yes    Which situations necessitate calling provider and whom to contact: Yes     Teaching Concerns Addressed:   Comments: Cast Care Instructions     Proper use and care of Ulnar gutter splintt: Yes  Pain management techniques: Yes  Wound Care: Yes  How and/when to access community resources: Yes     Cast was applied in standard Manner:  Yes  Cast fit well:  Yes  Patient reports cast to fit comfortably:  Yes    Instructional Materials Used/Given: Cast Care Instructions

## 2018-02-02 NOTE — PATIENT INSTRUCTIONS
Cast Care     Supporting your cast with a provided a sling or crutches can help with healing.    Your healthcare provider just gave you a cast made of plaster or fiberglass. This cast will hold your arm or leg in place to help it heal. Though it might feel a bit awkward at first, you ll soon get used to it. During the coming days and weeks, the way you treat your cast can play a big part in how fast and how well you heal.  Keep the cast dry  If a plaster cast gets wet, it can soften and fall apart. And if the padding of a fiberglass cast gets wet, it can irritate and damage your skin. So your cast must stay dry.    Avoid activities that can get your cast wet. These include swimming, fishing, washing dishes, and even going out in the rain.    Bathe as directed by your healthcare provider. When you bathe, keep your cast out of water and wrapped in plastic.    Don t soak your cast in water, even if it s wrapped in plastic.    If your cast does get wet, try drying it as soon as possible. To do this, use a hair dryer set to cool. Call your healthcare provider if your cast doesn t dry within 24 hours.  Handle with care  For the best results, remember the following:  Do    Do keep the cast clean and dry. Cover it with plastic to protect it when around dirt or water.    Do use any support you are given, such as crutches or a sling.    Do elevate the cast above your heart whenever possible.  Don t    Don t slide anything inside the cast, even to scratch your skin.    Don t put lotions or powders around the cast or inside it.    Don t hit the cast.    Don t cut the cast or pull it apart.    Don t wash the cast.  When to call your healthcare provider  Call your healthcare provider right away if you have any of the following:    Swelling or cast tightness that does not improve with elevation    If your cast breaks    If your cast gets wet and cannot be dried    If you have increasing pain, numbness, or tingling  Date Last  Reviewed: 9/3/2015    8770-5733 [employer]. [address] [place] [address], [place] [address]. All rights reserved. This information is not intended as a substitute for professional medical care. Always follow your healthcare professional's instructions.

## 2018-02-05 PROBLEM — M77.11 LATERAL EPICONDYLITIS OF RIGHT ELBOW: Status: RESOLVED | Noted: 2017-09-27 | Resolved: 2018-02-05

## 2018-02-05 NOTE — PROGRESS NOTES
Patient did not return to Physical Therapy to complete their plan of care, thus, full DC status is unknown. Please refer to the SOAP note dated 10/25/17 as well as subjective and objective reports copied below from the last visit for discharge information.   Subjective: Pt's pain has improved significantly since last session. Only experiences small amount of pain with various activities. Performs exercises somewhat consistently  Objective: R wrist extension 4+/5, painfree, no tenderness along lateral epicondyle.  Their bout of care ranged from 9/27/17 to 10/25/17. Discharge patient from PT at this time.

## 2018-02-09 ENCOUNTER — RADIANT APPOINTMENT (OUTPATIENT)
Dept: GENERAL RADIOLOGY | Facility: CLINIC | Age: 33
End: 2018-02-09
Attending: PREVENTIVE MEDICINE
Payer: COMMERCIAL

## 2018-02-09 DIAGNOSIS — S62.356A CLOSED NONDISPLACED FRACTURE OF SHAFT OF FIFTH METACARPAL BONE OF RIGHT HAND, INITIAL ENCOUNTER: ICD-10-CM

## 2018-02-13 ENCOUNTER — OFFICE VISIT (OUTPATIENT)
Dept: ORTHOPEDICS | Facility: CLINIC | Age: 33
End: 2018-02-13
Payer: COMMERCIAL

## 2018-02-13 ENCOUNTER — OFFICE VISIT (OUTPATIENT)
Dept: INTERNAL MEDICINE | Facility: CLINIC | Age: 33
End: 2018-02-13
Payer: COMMERCIAL

## 2018-02-13 VITALS — WEIGHT: 174 LBS | BODY MASS INDEX: 24.91 KG/M2 | HEIGHT: 70 IN

## 2018-02-13 VITALS
DIASTOLIC BLOOD PRESSURE: 71 MMHG | HEART RATE: 82 BPM | RESPIRATION RATE: 20 BRPM | OXYGEN SATURATION: 99 % | SYSTOLIC BLOOD PRESSURE: 111 MMHG | WEIGHT: 170.5 LBS | BODY MASS INDEX: 24.46 KG/M2

## 2018-02-13 DIAGNOSIS — F31.74 BIPOLAR DISORDER, IN FULL REMISSION, MOST RECENT EPISODE MANIC (H): Primary | ICD-10-CM

## 2018-02-13 DIAGNOSIS — S62.347D CLOSED NONDISPLACED FRACTURE OF BASE OF FIFTH METACARPAL BONE OF LEFT HAND WITH ROUTINE HEALING, SUBSEQUENT ENCOUNTER: Primary | ICD-10-CM

## 2018-02-13 ASSESSMENT — PAIN SCALES - GENERAL: PAINLEVEL: NO PAIN (0)

## 2018-02-13 NOTE — PATIENT INSTRUCTIONS
[facility]: [phone]     [facility] Medication Refill Request Information:  * Please contact your pharmacy regarding ANY request for medication refills.  ** [facility] Prescription Fax = [phone]  * Please allow 3 business days for routine medication refills.  * Please allow 5 business days for controlled substance medication refills.     [facility] Test Result notification information:  *You will be notified with in 7-10 days of your appointment day regarding the results of your test.  If you are on MyChart you will be notified as soon as the provider has reviewed the results and signed off on them.

## 2018-02-13 NOTE — NURSING NOTE
Chief Complaint   Patient presents with     Referral     needs referral to psychiatrist     Hypertension     concerned about high blood pressure and elevated pulse   Laurie Butler LPN 3:30 PM on 2/13/2018  Rooming Note  Health Maintenance   There are no preventive care reminders to display for this patient. All health maintenance items UP TO DATE  Laurie Butler LPN 3:32 PM on 2/13/2018

## 2018-02-13 NOTE — MR AVS SNAPSHOT
After Visit Summary   2/13/2018    Justo Cardoza    MRN: [id number]           Patient Information     Date Of Birth          1985        Visit Information        Provider Department      2/13/2018 3:05 PM Ashlyn Mcdowell MD [facility]        Today's Diagnoses     Bipolar disorder, in full remission, most recent episode manic (H)    -  1      Care Instructions    Primary Care Center: [phone]     Primary Care Center Medication Refill Request Information:  * Please contact your pharmacy regarding ANY request for medication refills.  ** [facility] Prescription Fax = [phone]  * Please allow 3 business days for routine medication refills.  * Please allow 5 business days for controlled substance medication refills.     Primary Care Center Test Result notification information:  *You will be notified with in 7-10 days of your appointment day regarding the results of your test.  If you are on MyChart you will be notified as soon as the provider has reviewed the results and signed off on them.                Follow-ups after your visit        Your next 10 appointments already scheduled     Mar 13, 2018  2:20 PM CDT   (Arrive by 2:05 PM)   Return Visit with Mark Anthony Alvares MD   [facility] ([facility])    [address] [place] [address] [phone]              Who to contact     Please call your clinic at [phone] to:    Ask questions about your health    Make or cancel appointments    Discuss your medicines    Learn about your test results    Speak to your doctor            Additional Information About Your Visit        MyChart Information     CareLuLuhart gives you secure access to your electronic health record. If you see a primary care provider, you can also send messages to your care team and make appointments. If you have questions, please call your primary care clinic.  If you do not have a primary  care provider, please call [phone] and they will assist you.      MyCityWay is an electronic gateway that provides easy, online access to your medical records. With MyCityWay, you can request a clinic appointment, read your test results, renew a prescription or communicate with your care team.     To access your existing account, please contact your [facility] or call [phone] for assistance.        Care EveryWhere ID     This is your Care EveryWhere ID. This could be used by other organizations to access your [place] medical records  [id number]        Your Vitals Were     Pulse Respirations Pulse Oximetry BMI (Body Mass Index)          82 20 99% 24.46 kg/m2         Blood Pressure from Last 3 Encounters:   02/13/18 111/71   01/22/18 130/73   09/19/17 115/71    Weight from Last 3 Encounters:   02/13/18 77.3 kg (170 lb 8 oz)   02/13/18 78.9 kg (174 lb)   02/01/18 78.9 kg (174 lb)              Today, you had the following     No orders found for display       Primary Care Provider Office Phone # Fax #    Ashlyn Mcdowell [phone]       [place] [address]        Equal Access to Services     MARCIO CAI : Hadii jess ku hadasho Soomaali, waaxda luqadaha, qaybta kaalmada adeegyada, alexus pylen ferny latif. So [facility] [phone].    ATENCIÓN: Si habla español, tiene a ruiz disposición servicios gratuitos de asistencia lingüística. Llame al [phone].    We comply with applicable federal civil rights laws and [place] laws. We do not discriminate on the basis of race, color, national origin, age, disability, sex, sexual orientation, or gender identity.            Thank you!     Thank you for choosing [facility]  for your care. Our goal is always to provide you with excellent care. Hearing back from our patients is one way we can continue to improve our services. Please take a few minutes to  complete the written survey that you may receive in the mail after your visit with us. Thank you!             Your Updated Medication List - Protect others around you: Learn how to safely use, store and throw away your medicines at www.disposemymeds.org.          This list is accurate as of 2/13/18 11:59 PM.  Always use your most recent med list.                   Brand Name Dispense Instructions for use Diagnosis    SEROQUEL PO       Right ankle pain       valACYclovir 1000 mg tablet    VALTREX    16 tablet    Take 2 tabs by mouth when symptoms of cold sores start, and 2 tabs 12 hours later, for total of 1 day of treatment.    Recurrent cold sores

## 2018-02-13 NOTE — LETTER
"2/13/2018       RE: Justo Cardoza  [address]   [place] [address]     Dear Colleague,    Thank you for referring your patient, Justo Cardoza, to the [facility] at [facility]. Please see a copy of my visit note below.    HISTORY OF PRESENT ILLNESS  Mr. Cardoza is a pleasant 32 year old year old male who presents to clinic today for follow up for left hand fracture 5th metacarpal. No pain today, has had splint on, feels improvement overall.  No numbness or tingling in fingers.    MEDICAL HISTORY  Patient Active Problem List   Diagnosis     Right ankle pain       Current Outpatient Prescriptions   Medication Sig Dispense Refill     valACYclovir (VALTREX) 1000 mg tablet Take 2 tabs by mouth when symptoms of cold sores start, and 2 tabs 12 hours later, for total of 1 day of treatment. (Patient not taking: Reported on 2/1/2018) 16 tablet 0     QUEtiapine Fumarate (SEROQUEL PO)          No Known Allergies    No family history on file.    Additional medical/Social/Surgical histories reviewed in [facility] and updated as appropriate.     REVIEW OF SYSTEMS (2/13/2018)  10 point ROS of systems including Constitutional, Eyes, Respiratory, Cardiovascular, Gastroenterology, Genitourinary, Integumentary, Musculoskeletal, Psychiatric were all negative except for pertinent positives noted in my HPI.     PHYSICAL EXAM  Vitals:    02/13/18 1308   Weight: 78.9 kg (174 lb)   Height: 1.778 m (5' 10\")     Vital Signs: Ht 1.778 m (5' 10\")  Wt 78.9 kg (174 lb)  BMI 24.97 kg/m2 Patient declined being weighed. Body mass index is 24.97 kg/(m^2).    General  - normal appearance, in no obvious distress  CV  - normal radial pulse  Pulm  - normal respiratory pattern, non-labored  Musculoskeletal - left hand  - inspection: normal joint alignment, no obvious deformity, mild swelling over 5th metacarpal left hand  - palpation: no bony or soft tissue tenderness-except at 5th metacarpal " of left hand, no tenderness at the anatomical snuffbox  - ROM of wrist:  90 deg flexion   70 deg extension   25 deg abduction   65 deg adduction  - strength: 5/5  strength- limited due to some pain and swelling, 5/5 flexion, extension, pronation, supination, adduction, abduction    Neuro  - no sensory or motor deficit, grossly normal coordination, normal muscle tone  Skin  - no ecchymosis, erythema, warmth, or induration, no obvious rash, some bruising resolving at left hand overlying 5th metacarpal  Psych  - interactive, appropriate, normal mood and affect    ASSESSMENT & PLAN  31 yo male with fracture of left hand 5th metacarpal bone, nondisplaced, healing  Splint removed.   exos cast placed  Return to clinic in 2 weeks  Repeat xrays and remove cast if swelling and xrays normal  F/u in 2 weeks    Mark Anthony Alvares MD, CAQSM

## 2018-02-13 NOTE — PROGRESS NOTES
"HISTORY OF PRESENT ILLNESS  Mr. Cardoza is a pleasant 32 year old year old male who presents to clinic today with left hand pain following an injury playing basketball about an hour ago  Justo explains that he thinks his hand may be broken  Location: left hand  Quality:  achy pain    Severity: 6/10 at worst    Duration: 1 hour+    Context: occurs while moving hand and wrist  Modifying factors:  resting and non-use makes it better, movement and use makes it worse  Associated signs & symptoms: swelling, no numbness or tingling  Previous similar pain: no  Additional history: as documented    MEDICAL HISTORY  Patient Active Problem List   Diagnosis     Right ankle pain       Current Outpatient Prescriptions   Medication Sig Dispense Refill     QUEtiapine Fumarate (SEROQUEL PO)        valACYclovir (VALTREX) 1000 mg tablet Take 2 tabs by mouth when symptoms of cold sores start, and 2 tabs 12 hours later, for total of 1 day of treatment. (Patient not taking: Reported on 2/1/2018) 16 tablet 0       No Known Allergies    No family history on file.    Additional medical/Social/Surgical histories reviewed in [employer] and updated as appropriate.     REVIEW OF SYSTEMS (2/13/2018)  10 point ROS of systems including Constitutional, Eyes, Respiratory, Cardiovascular, Gastroenterology, Genitourinary, Integumentary, Musculoskeletal, Psychiatric were all negative except for pertinent positives noted in my HPI.     PHYSICAL EXAM  Vitals:    02/01/18 1904   Weight: 78.9 kg (174 lb)   Height: 1.778 m (5' 10\")     Vital Signs: Ht 1.778 m (5' 10\")  Wt 78.9 kg (174 lb)  BMI 24.97 kg/m2 Patient declined being weighed. Body mass index is 24.97 kg/(m^2).    General  - normal appearance, in no obvious distress  CV  - normal radial pulse  Pulm  - normal respiratory pattern, non-labored  Musculoskeletal - left hand  - inspection: normal joint alignment, no obvious deformity, swelling overlying 5th metacarpal  - palpation: no bony or soft tissue " tenderness- except overlying 5th metacarpal, no tenderness at the anatomical snuffbox  - ROM: limited due to pain in hand  - strength: 5/5  strength, 5/5 flexion, extension, pronation, supination, adduction, abduction  - special tests:  (-) Tinel's  (-) Finkelstein  (-) Phalen  (-) Mccartney click test  (-) ulnar impaction  Neuro  - no sensory or motor deficit, grossly normal coordination, normal muscle tone  Skin  - no ecchymosis, erythema, warmth, or induration, no obvious rash  Psych  - interactive, appropriate, normal mood and affect    ASSESSMENT & PLAN  33 yo male with left hand 5th metacarpal fracture, acute, nondisplaced.  Splint placed  xrays reviewed: 5th metacarpal fracture can be managed non-operatively, will repeat xrays in 1 week and f/u in 1.5 weeks  Consider casting at that time.  Tylenol and ice PRN      Mark Anthony Alvares MD, CAM

## 2018-02-13 NOTE — PROGRESS NOTES
"HISTORY OF PRESENT ILLNESS  Mr. Cardoza is a pleasant 32 year old year old male who presents to clinic today for follow up for left hand fracture 5th metacarpal. No pain today, has had splint on, feels improvement overall.  No numbness or tingling in fingers.    MEDICAL HISTORY  Patient Active Problem List   Diagnosis     Right ankle pain       Current Outpatient Prescriptions   Medication Sig Dispense Refill     valACYclovir (VALTREX) 1000 mg tablet Take 2 tabs by mouth when symptoms of cold sores start, and 2 tabs 12 hours later, for total of 1 day of treatment. (Patient not taking: Reported on 2/1/2018) 16 tablet 0     QUEtiapine Fumarate (SEROQUEL PO)          No Known Allergies    No family history on file.    Additional medical/Social/Surgical histories reviewed in [employer] and updated as appropriate.     REVIEW OF SYSTEMS (2/13/2018)  10 point ROS of systems including Constitutional, Eyes, Respiratory, Cardiovascular, Gastroenterology, Genitourinary, Integumentary, Musculoskeletal, Psychiatric were all negative except for pertinent positives noted in my HPI.     PHYSICAL EXAM  Vitals:    02/13/18 1308   Weight: 78.9 kg (174 lb)   Height: 1.778 m (5' 10\")     Vital Signs: Ht 1.778 m (5' 10\")  Wt 78.9 kg (174 lb)  BMI 24.97 kg/m2 Patient declined being weighed. Body mass index is 24.97 kg/(m^2).    General  - normal appearance, in no obvious distress  CV  - normal radial pulse  Pulm  - normal respiratory pattern, non-labored  Musculoskeletal - left hand  - inspection: normal joint alignment, no obvious deformity, mild swelling over 5th metacarpal left hand  - palpation: no bony or soft tissue tenderness-except at 5th metacarpal of left hand, no tenderness at the anatomical snuffbox  - ROM of wrist:  90 deg flexion   70 deg extension   25 deg abduction   65 deg adduction  - strength: 5/5  strength- limited due to some pain and swelling, 5/5 flexion, extension, pronation, supination, adduction, " abduction    Neuro  - no sensory or motor deficit, grossly normal coordination, normal muscle tone  Skin  - no ecchymosis, erythema, warmth, or induration, no obvious rash, some bruising resolving at left hand overlying 5th metacarpal  Psych  - interactive, appropriate, normal mood and affect    ASSESSMENT & PLAN  33 yo male with fracture of left hand 5th metacarpal bone, nondisplaced, healing  Splint removed.   exos cast placed  Return to clinic in 2 weeks  Repeat xrays and remove cast if swelling and xrays normal  F/u in 2 weeks    Mark Anthony Alvares MD, CAQSM

## 2018-02-13 NOTE — MR AVS SNAPSHOT
After Visit Summary   2/13/2018    Justo Cardoza    MRN: [id number]           Patient Information     Date Of Birth          1985        Visit Information        Provider Department      2/13/2018 1:00 PM Mark Anthony Alvares MD [facility]        Today's Diagnoses     Closed nondisplaced fracture of base of fifth metacarpal bone of left hand with routine healing, subsequent encounter    -  1       Follow-ups after your visit        Your next 10 appointments already scheduled     Feb 13, 2018  3:05 PM CST   (Arrive by 2:50 PM)   Return Visit with Ashlyn Mcdowell MD   [facility] ([facility])    [address]  [address] [facility] [address] [phone]              Who to contact     Please call your clinic at [phone] to:    Ask questions about your health    Make or cancel appointments    Discuss your medicines    Learn about your test results    Speak to your doctor            Additional Information About Your Visit        ZoomdataharQmerce Information     IID gives you secure access to your electronic health record. If you see a primary care provider, you can also send messages to your care team and make appointments. If you have questions, please call your primary care clinic.  If you do not have a primary care provider, please call [phone] and they will assist you.      IID is an electronic gateway that provides easy, online access to your medical records. With IID, you can request a clinic appointment, read your test results, renew a prescription or communicate with your care team.     To access your existing account, please contact your [facility] or call [phone] for assistance.        Care EveryWhere ID     This is your Care EveryWhere ID. This could be used by other organizations to access your [facility] medical records  [id number]        Your Vitals Were      "Height BMI (Body Mass Index)                1.778 m (5' 10\") 24.97 kg/m2           Blood Pressure from Last 3 Encounters:   01/22/18 130/73   09/19/17 115/71    Weight from Last 3 Encounters:   02/13/18 78.9 kg (174 lb)   02/01/18 78.9 kg (174 lb)   01/22/18 79.3 kg (174 lb 12.8 oz)              Today, you had the following     No orders found for display       Primary Care Provider Office Phone # Fax #    Emilee Pimentel [phone] [address] [address]        Equal Access to Services     DEBORAH CAI : Chantel Mcfarland, myriam rios, sarahi oromada mario, alexus latif. So [facility] [phone].    ATENCIÓN: Si habla español, tiene a ruiz disposición servicios gratuitos de asistencia lingüística. Llame al [phone].    We comply with applicable federal civil rights laws and [place] laws. We do not discriminate on the basis of race, color, national origin, age, disability, sex, sexual orientation, or gender identity.            Thank you!     Thank you for choosing [facility]  for your care. Our goal is always to provide you with excellent care. Hearing back from our patients is one way we can continue to improve our services. Please take a few minutes to complete the written survey that you may receive in the mail after your visit with us. Thank you!             Your Updated Medication List - Protect others around you: Learn how to safely use, store and throw away your medicines at www.disposemymeds.org.          This list is accurate as of 2/13/18  1:57 PM.  Always use your most recent med list.                   Brand Name Dispense Instructions for use Diagnosis    SEROQUEL PO       Right ankle pain       valACYclovir 1000 mg tablet    VALTREX    16 tablet    Take 2 tabs by mouth when symptoms of cold sores start, and 2 tabs 12 hours later, for total of 1 day of treatment.    Recurrent cold sores "

## 2018-03-13 ENCOUNTER — RADIANT APPOINTMENT (OUTPATIENT)
Dept: GENERAL RADIOLOGY | Facility: CLINIC | Age: 33
End: 2018-03-13
Attending: PREVENTIVE MEDICINE
Payer: COMMERCIAL

## 2018-03-13 ENCOUNTER — OFFICE VISIT (OUTPATIENT)
Dept: ORTHOPEDICS | Facility: CLINIC | Age: 33
End: 2018-03-13
Payer: COMMERCIAL

## 2018-03-13 VITALS — WEIGHT: 170 LBS | BODY MASS INDEX: 24.34 KG/M2 | HEIGHT: 70 IN

## 2018-03-13 DIAGNOSIS — S62.92XA FRACTURE OF LEFT HAND: ICD-10-CM

## 2018-03-13 DIAGNOSIS — S62.92XA FRACTURE OF LEFT HAND: Primary | ICD-10-CM

## 2018-03-13 DIAGNOSIS — S62.347D CLOSED NONDISPLACED FRACTURE OF BASE OF FIFTH METACARPAL BONE OF LEFT HAND WITH ROUTINE HEALING, SUBSEQUENT ENCOUNTER: Primary | ICD-10-CM

## 2018-03-13 NOTE — PROGRESS NOTES
"HISTORY OF PRESENT ILLNESS  Mr. Cardoza is a pleasant 32 year old year old male who presents to clinic today for followup for hand fracture. Has been wearing splint and feels less p ain and improvement overall.  No new complaints  MEDICAL HISTORY  Patient Active Problem List   Diagnosis     Right ankle pain       Current Outpatient Prescriptions   Medication Sig Dispense Refill     valACYclovir (VALTREX) 1000 mg tablet Take 2 tabs by mouth when symptoms of cold sores start, and 2 tabs 12 hours later, for total of 1 day of treatment. 16 tablet 0     QUEtiapine Fumarate (SEROQUEL PO)          No Known Allergies    No family history on file.    Additional medical/Social/Surgical histories reviewed in [facility] and updated as appropriate.     REVIEW OF SYSTEMS (3/13/2018)  10 point ROS of systems including Constitutional, Eyes, Respiratory, Cardiovascular, Gastroenterology, Genitourinary, Integumentary, Musculoskeletal, Psychiatric were all negative except for pertinent positives noted in my HPI.     PHYSICAL EXAM  Vitals:    03/13/18 1428   Weight: 77.1 kg (170 lb)   Height: 1.778 m (5' 10\")     Vital Signs: Ht 1.778 m (5' 10\")  Wt 77.1 kg (170 lb)  BMI 24.39 kg/m2 Patient declined being weighed. Body mass index is 24.39 kg/(m^2).    General  - normal appearance, in no obvious distress  CV  - normal radial pulse  Pulm  - normal respiratory pattern, non-labored  Musculoskeletal - left hand  - inspection: normal joint alignment, no obvious deformity, no swelling  - palpation: no bony or soft tissue tenderness, except mild ttp over soft tissue of tendons overlying 5th metacarpal, no tenderness at the anatomical snuffbox  - ROM:  90 deg flexion   70 deg extension   25 deg abduction   65 deg adduction  - strength: 5/5  strength, 5/5 flexion, extension, pronation, supination, adduction, abduction  - special tests:  (-) Tinel's  (-) Finkelstein  (-) Phalen  (-) Mccartney click test  (-) ulnar impaction  Neuro  - no sensory or " motor deficit, grossly normal coordination, normal muscle tone  Skin  - no ecchymosis, erythema, warmth, or induration, no obvious rash  Psych  - interactive, appropriate, normal mood and affect    ASSESSMENT & PLAN  31 yo male with left 5th metacarpal fracture improved  Cont. PRN use of splint  HEP given  Hand therapy ordered  F/u in 2 weeks   No repeat imaging unless worsens    Mark Anthony Alvares MD, CAQSM

## 2018-03-13 NOTE — LETTER
"3/13/2018       RE: Justo Cardoza  [address]   [place] [address]     Dear Colleague,    Thank you for referring your patient, Justo Cardoza, to the [facility] at [facility]. Please see a copy of my visit note below.    HISTORY OF PRESENT ILLNESS  Mr. Cardoza is a pleasant 32 year old year old male who presents to clinic today for followup for hand fracture. Has been wearing splint and feels less p ain and improvement overall.  No new complaints  MEDICAL HISTORY  Patient Active Problem List   Diagnosis     Right ankle pain       Current Outpatient Prescriptions   Medication Sig Dispense Refill     valACYclovir (VALTREX) 1000 mg tablet Take 2 tabs by mouth when symptoms of cold sores start, and 2 tabs 12 hours later, for total of 1 day of treatment. 16 tablet 0     QUEtiapine Fumarate (SEROQUEL PO)          No Known Allergies    No family history on file.    Additional medical/Social/Surgical histories reviewed in [employer] and updated as appropriate.     REVIEW OF SYSTEMS (3/13/2018)  10 point ROS of systems including Constitutional, Eyes, Respiratory, Cardiovascular, Gastroenterology, Genitourinary, Integumentary, Musculoskeletal, Psychiatric were all negative except for pertinent positives noted in my HPI.     PHYSICAL EXAM  Vitals:    03/13/18 1428   Weight: 77.1 kg (170 lb)   Height: 1.778 m (5' 10\")     Vital Signs: Ht 1.778 m (5' 10\")  Wt 77.1 kg (170 lb)  BMI 24.39 kg/m2 Patient declined being weighed. Body mass index is 24.39 kg/(m^2).    General  - normal appearance, in no obvious distress  CV  - normal radial pulse  Pulm  - normal respiratory pattern, non-labored  Musculoskeletal - left hand  - inspection: normal joint alignment, no obvious deformity, no swelling  - palpation: no bony or soft tissue tenderness, except mild ttp over soft tissue of tendons overlying 5th metacarpal, no tenderness at the anatomical snuffbox  - ROM:  90 deg " flexion   70 deg extension   25 deg abduction   65 deg adduction  - strength: 5/5  strength, 5/5 flexion, extension, pronation, supination, adduction, abduction  - special tests:  (-) Tinel's  (-) Finkelstein  (-) Phalen  (-) Mccartney click test  (-) ulnar impaction  Neuro  - no sensory or motor deficit, grossly normal coordination, normal muscle tone  Skin  - no ecchymosis, erythema, warmth, or induration, no obvious rash  Psych  - interactive, appropriate, normal mood and affect    ASSESSMENT & PLAN  31 yo male with left 5th metacarpal fracture improved  Cont. PRN use of splint  HEP given  Hand therapy ordered  F/u in 2 weeks   No repeat imaging unless worsens    Mark Anthony Alvares MD, CAQSM

## 2018-03-13 NOTE — MR AVS SNAPSHOT
After Visit Summary   3/13/2018    Justo Cardoza    MRN: [id number]           Patient Information     Date Of Birth          1985        Visit Information        Provider Department      3/13/2018 2:20 PM Mark Anthony Alvares MD [facility]        Today's Diagnoses     Closed nondisplaced fracture of base of fifth metacarpal bone of left hand with routine healing, subsequent encounter    -  1       Follow-ups after your visit        Additional Services     HAND THERAPY       Hand Therapy Referral  Please improve ROM, strength of hand following a healing 5th metacarpal fracture  Please give Home exercise program                  Your next 10 appointments already scheduled     Mar 19, 2018  2:00 PM CDT   (Arrive by 1:45 PM)   ERIK Hand with Lelia Johnson OT   [facility] ([facility])    [address] [place] [id number] [phone]            Mar 26, 2018  5:00 PM CDT   ERIK Hand with Lelia Johnson OT   [facility] ([facility])    [address] [place] [address] [phone]            Mar 27, 2018  3:20 PM CDT   (Arrive by 3:05 PM)   Return Visit with Mark Anthony Alvares MD   [facility] ([facility])    [address] [place] [address] [phone]              Who to contact     Please call your clinic at [phone] to:    Ask questions about your health    Make or cancel appointments    Discuss your medicines    Learn about your test results    Speak to your doctor            Additional Information About Your Visit        MyChart Information     [employer]t gives you secure access to your electronic health record. If you see a primary care provider, you can also send messages to your care team and make appointments. If you have questions, please call your primary care clinic.  If you do  "not have a primary care provider, please call [phone] and they will assist you.      [employer] is an electronic gateway that provides easy, online access to your medical records. With [employer], you can request a clinic appointment, read your test results, renew a prescription or communicate with your care team.     To access your existing account, please contact your [facility] or call [phone] for assistance.        Care EveryWhere ID     This is your Care EveryWhere ID. This could be used by other organizations to access your [facility] medical records  [id number]        Your Vitals Were     Height BMI (Body Mass Index)                1.778 m (5' 10\") 24.39 kg/m2           Blood Pressure from Last 3 Encounters:   02/13/18 111/71   01/22/18 130/73   09/19/17 115/71    Weight from Last 3 Encounters:   03/13/18 77.1 kg (170 lb)   02/13/18 77.3 kg (170 lb 8 oz)   02/13/18 78.9 kg (174 lb)              We Performed the Following     HAND THERAPY        Primary Care Provider Office Phone # Fax #    Ashlyn Mcdowell [phone]       [place] [address]        Equal Access to Services     MARCIO CAI : Hadii aad ku hadasho Soomaali, waaxda luqadaha, qaybta kaalmada adeegyada, waxay idiin haytiffanien ferny johnson laale latif. So [facility] [phone].    ATENCIÓN: Si habla español, tiene a ruiz disposición servicios gratuitos de asistencia lingüística. Llame al [phone].    We comply with applicable federal civil rights laws and [place] laws. We do not discriminate on the basis of race, color, national origin, age, disability, sex, sexual orientation, or gender identity.            Thank you!     Thank you for choosing [facility]  for your care. Our goal is always to provide you with excellent care. Hearing back from our patients is one way we can continue to improve our services. Please take a few minutes to complete the " written survey that you may receive in the mail after your visit with us. Thank you!             Your Updated Medication List - Protect others around you: Learn how to safely use, store and throw away your medicines at www.disposemymeds.org.          This list is accurate as of 3/13/18  2:46 PM.  Always use your most recent med list.                   Brand Name Dispense Instructions for use Diagnosis    SEROQUEL PO       Right ankle pain       valACYclovir 1000 mg tablet    VALTREX    16 tablet    Take 2 tabs by mouth when symptoms of cold sores start, and 2 tabs 12 hours later, for total of 1 day of treatment.    Recurrent cold sores

## 2018-03-19 ENCOUNTER — THERAPY VISIT (OUTPATIENT)
Dept: OCCUPATIONAL THERAPY | Facility: CLINIC | Age: 33
End: 2018-03-19
Payer: COMMERCIAL

## 2018-03-19 DIAGNOSIS — S62.309A CLOSED FRACTURE OF METACARPAL BONE: Primary | ICD-10-CM

## 2018-03-19 DIAGNOSIS — M79.642 PAIN OF LEFT HAND: ICD-10-CM

## 2018-03-19 PROCEDURE — 97110 THERAPEUTIC EXERCISES: CPT | Mod: GO | Performed by: OCCUPATIONAL THERAPIST

## 2018-03-19 PROCEDURE — 97165 OT EVAL LOW COMPLEX 30 MIN: CPT | Mod: GO | Performed by: OCCUPATIONAL THERAPIST

## 2018-03-19 NOTE — MR AVS SNAPSHOT
After Visit Summary   3/19/2018    Justo Cardoza    MRN: [id number]           Patient Information     Date Of Birth          1985        Visit Information        Provider Department      3/19/2018 2:00 PM Lelia Johnson OT [facility]        Today's Diagnoses     Closed fracture of metacarpal bone    -  1    Pain of left hand           Follow-ups after your visit        Your next 10 appointments already scheduled     Mar 26, 2018  5:00 PM CDT   ERIK Hand with Lelia Johnson OT   [facility] ([facility])    [address] [place] [id number] [phone]            Mar 27, 2018  3:20 PM CDT   (Arrive by 3:05 PM)   Return Visit with Mark Anthony Alvares MD   [facility] ([facility])    [address] [place] [address] [id number] [phone]              Who to contact     If you have questions or need follow up information about today's clinic visit or your schedule please contact [facility] directly at [phone].  Normal or non-critical lab and imaging results will be communicated to you by [employer], letter or phone within 4 business days after the clinic has received the results. If you do not hear from us within 7 days, please contact the clinic through [employer] or phone. If you have a critical or abnormal lab result, we will notify you by phone as soon as possible.  Submit refill requests through Rede[employer]r or call your pharmacy and they will forward the refill request to us. Please allow 3 business days for your refill to be completed.          Additional Information About Your Visit        [employer] Information     Red[employer]r gives you secure access to your electronic health record. If you see a primary care provider, you can also send messages to your care team and make appointments. If you have questions, please call your primary care clinic.  If you do not  have a primary care provider, please call [phone] and they will assist you.        Care EveryWhere ID     This is your Care EveryWhere ID. This could be used by other organizations to access your [facility] medical records  [id number]         Blood Pressure from Last 3 Encounters:   02/13/18 111/71   01/22/18 130/73   09/19/17 115/71    Weight from Last 3 Encounters:   03/13/18 77.1 kg (170 lb)   02/13/18 77.3 kg (170 lb 8 oz)   02/13/18 78.9 kg (174 lb)              We Performed the Following     HC OT EVAL, LOW COMPLEXITY     THERAPEUTIC EXERCISES        Primary Care Provider Office Phone # Fax #    Ashlyn Mcdowell [phone]       [place] [address]        Equal Access to Services     MARCIO CAI : Hadii aad ku hadasho Soomaali, waaxda luqadaha, qaybta kaalmada adecharlesyada, alexus moore haysathya soni . So [facility] [phone].    ATENCIÓN: Si habla español, tiene a ruiz disposición servicios gratuitos de asistencia lingüística. Bre al [phone].    We comply with applicable federal civil rights laws and [place] laws. We do not discriminate on the basis of race, color, national origin, age, disability, sex, sexual orientation, or gender identity.            Thank you!     Thank you for choosing [facility]  for your care. Our goal is always to provide you with excellent care. Hearing back from our patients is one way we can continue to improve our services. Please take a few minutes to complete the written survey that you may receive in the mail after your visit with us. Thank you!             Your Updated Medication List - Protect others around you: Learn how to safely use, store and throw away your medicines at www.disposemymeds.org.          This list is accurate as of 3/19/18 11:59 PM.  Always use your most recent med list.                   Brand Name Dispense Instructions for use Diagnosis    SEROQUEL PO       Right ankle  pain       valACYclovir 1000 mg tablet    VALTREX    16 tablet    Take 2 tabs by mouth when symptoms of cold sores start, and 2 tabs 12 hours later, for total of 1 day of treatment.    Recurrent cold sores

## 2018-03-19 NOTE — PROGRESS NOTES
Hand Therapy Initial Evaluation    Current Date:  3/19/2018    Diagnosis:  Left  5th metacarpal fracture  Date of onset:  2/1/18  Date of hand therapy orders: 3/13/18  Post:  6w 4d  Referring MD: Mark Anthony Alvares MD  Next MD visit: 3/27/18    Subjective:  Justo Cardoza is a 32 year old R hand dominant male.    Patient reports symptoms of pain, stiffness/loss of motion, weakness/loss of strength and edema of the left hand which occurred due to basketball injury. Since onset symptoms are Gradually getting better.  Special tests:  x-ray.  Previous treatment: exos brace.    General health as reported by patient is good.  Pertinent medical history includes:mental illness  Medical allergies:none.  Surgical history: other: appendectomy, hernia, hydrocolectomy.  Medication history: see chart.    Occupational Profile Information:  Current occupation is professor of Applied Economics  Currently working in normal job without restrictions  Job Tasks: prolonged sitting  Prior functional level:  no limitations  Barriers include:none  Mobility: No difficulty  Transportation: drives  Leisure activities/hobbies: basketball, running, video games, playing with dog     Functional Outcome Measure:   Please refer to flow sheet.    Objective:  Pain Level Report  VAS(0-10) 3/19/2018   At Rest: 0   With Use: 0     Report of Pain:  Location:  Ulnar portion of hand  Pain Quality:  Aching and Dull  Frequency: intermittent    Pain is worst:  daytime  Exacerbated by:  He may have rolled on it last night, it is a little more sore today  Relieved by:  rest and exos brace, ibuprofen sometimes  Progression:  Improving (has been fine until today it became a bit sore  Edema:  MILD between the 4th/5th metacarpals on the L  Sensation: WNL throughout all nerve distributions; per patient report    ROM  Wrist  3/19/2018   AROM (PROM) R L   Extension 65 68   Flexion 65 62   RD 24 22   UD 45 34   Supination     Pronation       ROM: fingers: full AROM of L 5th  finger, with HE available at the MPj although this is mildly less than the R    Strength:  R: 92. L deferred    Assessment:  Patient presents with symptoms consistent with diagnosis of L 5th finger metacarpal fracture,  with non-surgical intervention.     Patient's limitations or Problem List includes:  Pain, Decreased  and Decreased pinch of the left hand which interferes with the patient's ability to perform Sleep Patterns, Recreational Activities and Household Chores as compared to previous level of function.    Rehab Potential:  Excellent - Return to full activity, no limitations    Patient will benefit from skilled Occupational Therapy to increase overall strength and decrease pain and edema to return to previous activity level and resume normal daily tasks and to reach their rehab potential.    Barriers to Learning:  No barrier    Communication Issues:  Patient appears to be able to clearly communicate and understand verbal and written communication and follow directions correctly.    Chart Review: Brief history including review of medical and/or therapy records relating to the presenting problem and Simple history review with patient    Identified Performance Deficits: home establishment and management, meal preparation and cleanup, shopping and leisure activities    Assessment of Occupational Performance:  3-5 Performance Deficits    Clinical Decision Making (Complexity): Low complexity    Treatment Explanation:  The following has been discussed with the patient:  RX ordered/plan of care  Anticipated outcomes  Possible risks and side effects    Plan:  Frequency:  2 X a month, once daily  Duration:  for 2 months    Treatment Plan:   Therapeutic Exercise:  AROM, Reverse Blocking, Isotonics, Isometrics and Stabilization  Neuromuscular re-education:  Proprioceptive Training  Manual Techniques:  Myofascial release and Manual edema mobilization  Orthotic Fabrication:  Static orthosis and Hand based  orthosis  Self Care:  Ergonomic Considerations  Discharge Plan:  Achieve all LTG.  Independent in home treatment program.  Reach maximal therapeutic benefit.    Home Exercise Program:  AROM to wrist, tendon gliding  Wear exos brace when out / at risk of fall on ice  Icing as needed  Gentle massage between 4th/5th finger between metacarpals    Next Visit:  Strengthening as allowed per MD  Orthosis as needed

## 2018-03-20 PROBLEM — M79.642 PAIN OF LEFT HAND: Status: ACTIVE | Noted: 2018-03-20

## 2018-03-20 PROBLEM — S62.309A CLOSED FRACTURE OF METACARPAL BONE: Status: ACTIVE | Noted: 2018-03-20

## 2018-03-26 ENCOUNTER — THERAPY VISIT (OUTPATIENT)
Dept: OCCUPATIONAL THERAPY | Facility: CLINIC | Age: 33
End: 2018-03-26
Payer: COMMERCIAL

## 2018-03-26 DIAGNOSIS — S62.309A CLOSED FRACTURE OF METACARPAL BONE: ICD-10-CM

## 2018-03-26 DIAGNOSIS — M79.642 PAIN OF LEFT HAND: Primary | ICD-10-CM

## 2018-03-26 PROCEDURE — 97110 THERAPEUTIC EXERCISES: CPT | Mod: GO | Performed by: OCCUPATIONAL THERAPIST

## 2018-03-26 NOTE — MR AVS SNAPSHOT
After Visit Summary   3/26/2018    Justo Cardoza    MRN: [id number]           Patient Information     Date Of Birth          1985        Visit Information        Provider Department      3/26/2018 5:00 PM Lelia Johnson OT [facility]        Today's Diagnoses     Pain of left hand    -  1    Closed fracture of metacarpal bone           Follow-ups after your visit        Your next 10 appointments already scheduled     Mar 27, 2018  3:20 PM CDT   (Arrive by 3:05 PM)   Return Visit with Mark Anthony Alvares MD   [facility] ([facility])    [address] [place] [id number] [phone]              Who to contact     If you have questions or need follow up information about today's clinic visit or your schedule please contact [facility] directly at [phone].  Normal or non-critical lab and imaging results will be communicated to you by MyChart, letter or phone within 4 business days after the clinic has received the results. If you do not hear from us within 7 days, please contact the clinic through [employer] or phone. If you have a critical or abnormal lab result, we will notify you by phone as soon as possible.  Submit refill requests through [employer] or call your pharmacy and they will forward the refill request to us. Please allow 3 business days for your refill to be completed.          Additional Information About Your Visit        MyChart Information     [employer] gives you secure access to your electronic health record. If you see a primary care provider, you can also send messages to your care team and make appointments. If you have questions, please call your primary care clinic.  If you do not have a primary care provider, please call [phone] and they will assist you.        Care EveryWhere ID     This is your Care EveryWhere ID. This could be used by other organizations to access your [place]  medical records  [id number]         Blood Pressure from Last 3 Encounters:   02/13/18 111/71   01/22/18 130/73   09/19/17 115/71    Weight from Last 3 Encounters:   03/13/18 77.1 kg (170 lb)   02/13/18 77.3 kg (170 lb 8 oz)   02/13/18 78.9 kg (174 lb)              We Performed the Following     THERAPEUTIC EXERCISES        Primary Care Provider Office Phone # Fax #    Ashlyn Mcdowell [phone]       [place] [address]        Equal Access to Services     [facility]: Hadii jess valenzulea hadasho Sobrain, waaxda luqadaha, qaybta kaalmada adecharlesyajian, alexus soni . So [facility] [phone].    ATENCIÓN: Si habla español, tiene a ruiz disposición servicios gratuitos de asistencia lingüística. [facility] [phone].    We comply with applicable federal civil rights laws and [place] laws. We do not discriminate on the basis of race, color, national origin, age, disability, sex, sexual orientation, or gender identity.            Thank you!     Thank you for choosing [facility]  for your care. Our goal is always to provide you with excellent care. Hearing back from our patients is one way we can continue to improve our services. Please take a few minutes to complete the written survey that you may receive in the mail after your visit with us. Thank you!             Your Updated Medication List - Protect others around you: Learn how to safely use, store and throw away your medicines at www.disposemymeds.org.          This list is accurate as of 3/26/18  9:11 PM.  Always use your most recent med list.                   Brand Name Dispense Instructions for use Diagnosis    SEROQUEL PO       Right ankle pain       valACYclovir 1000 mg tablet    VALTREX    16 tablet    Take 2 tabs by mouth when symptoms of cold sores start, and 2 tabs 12 hours later, for total of 1 day of treatment.    Recurrent cold sores

## 2018-03-26 NOTE — PROGRESS NOTES
Hand Therapy Discharge Note    Current Date:  3/19/2018    Diagnosis:  Left  5th metacarpal fracture  Date of onset:  2/1/18  Date of hand therapy orders: 3/13/18  Post:  8w 4d  Referring MD: Mark Anthony Alvares MD  Next MD visit: 3/27/18    Subjective:  Justo reports his baby daughter was born 3 days ago. He has been holding her, and his wife is going home form the hospital today. He notes the hand is doing well. He feels some pain at times at end range wrist extension, and he has been careful with lifting.      Objective:  Pain Level Report  VAS(0-10) 3/19/2018 3/26/2018     At Rest: 0 0   With Use: 0 Mild pain at times taht subsides     Report of Pain:  Location:  Ulnar portion of hand  Pain Quality:  Aching and Dull  Frequency: intermittent    Pain is worst:  daytime  Exacerbated by: lifting  Relieved by:  rest and exos brace, ibuprofen sometimes  Progression:  Improving (has been fine until today it became a bit sore  Edema:  Minimal between the 4th/5th metacarpals on the L  Sensation: WNL throughout all nerve distributions; per patient report    ROM  Wrist  3/19/2018   AROM (PROM) R L   Extension 65 68   Flexion 65 62   RD 24 22   UD 45 34   Supination     Pronation       ROM: fingers: full AROM of L 5th finger, with HE available at the MPj although this is mildly less than the R    Strength:  R: 92. L deferred      Assessment:  Pt has made good progress with AROM and demos good strength for light ADLs. He is respoecting pain with activity. He tolerates light strengthening today. He is a new dad, and it appears his L hand will tolerate childcare tasks at this time. He will likely gain full strength with time and on his own with a HEP, and does not require further formal hand therapy unless problems arise.  Overall Assessment:  Patient's symptoms are resolving.  Patient is progressing well.  Patient is independent in home exercise program.  Patient is ready to be discharged from therapy and continue their home  treatment program.  STG/LTG:  See goal sheet for details and updates.    Plan:  Frequency/Duration:  Discharge from Hand Therapy; continue home program.    Home Exercise Program:  AROM to wrist, tendon gliding  Icing as needed  Gentle massage between 4th/5th finger between metacarpals  Orthosis as needed  Light wrist isotonics  Light pinch /  with foam - ball - sock ball

## 2018-03-27 ENCOUNTER — OFFICE VISIT (OUTPATIENT)
Dept: ORTHOPEDICS | Facility: CLINIC | Age: 33
End: 2018-03-27
Payer: COMMERCIAL

## 2018-03-27 VITALS — WEIGHT: 170 LBS | BODY MASS INDEX: 24.34 KG/M2 | HEIGHT: 70 IN

## 2018-03-27 DIAGNOSIS — S62.347D CLOSED NONDISPLACED FRACTURE OF BASE OF FIFTH METACARPAL BONE OF LEFT HAND WITH ROUTINE HEALING, SUBSEQUENT ENCOUNTER: Primary | ICD-10-CM

## 2018-03-27 NOTE — MR AVS SNAPSHOT
"              After Visit Summary   3/27/2018    Justo Cardoza    MRN: [id number]           Patient Information     Date Of Birth          1985        Visit Information        Provider Department      3/27/2018 3:20 PM Mark Anthony Alvares MD [facility]        Today's Diagnoses     Closed nondisplaced fracture of base of fifth metacarpal bone of left hand with routine healing, subsequent encounter    -  1       Follow-ups after your visit        Who to contact     Please call your clinic at [phone] to:    Ask questions about your health    Make or cancel appointments    Discuss your medicines    Learn about your test results    Speak to your doctor            Additional Information About Your Visit        [employer] Information     [employer] gives you secure access to your electronic health record. If you see a primary care provider, you can also send messages to your care team and make appointments. If you have questions, please call your primary care clinic.  If you do not have a primary care provider, please call [phone] and they will assist you.      [employer] is an electronic gateway that provides easy, online access to your medical records. With [employer], you can request a clinic appointment, read your test results, renew a prescription or communicate with your care team.     To access your existing account, please contact your [facility] or call [phone] for assistance.        Care EveryWhere ID     This is your Care EveryWhere ID. This could be used by other organizations to access your [place] medical records  [id number]        Your Vitals Were     Height BMI (Body Mass Index)                1.778 m (5' 10\") 24.39 kg/m2           Blood Pressure from Last 3 Encounters:   02/13/18 111/71   01/22/18 130/73   09/19/17 115/71    Weight from Last 3 Encounters:   03/27/18 77.1 kg (170 lb)   03/13/18 77.1 kg (170 lb)   02/13/18 77.3 kg (170 lb 8 oz)    "           Today, you had the following     No orders found for display       Primary Care Provider Office Phone # Fax #    Ashlyn Mcdowell [phone]       [address] [address]        Equal Access to Services     MARC[facility]I : Hadii jess ku hadelviso Sosoloali, waaxda luqadaha, qaybta kaalmada adeegyada, waxemanuel escobarin haytiffanien ferny sierranasreennima latif. So [facility] [phone].    ATENCIÓN: Si habla español, tiene a ruiz disposición servicios gratuitos de asistencia lingüística. Llame al [phone].    We comply with applicable federal civil rights laws and [place] laws. We do not discriminate on the basis of race, color, national origin, age, disability, sex, sexual orientation, or gender identity.            Thank you!     Thank you for choosing [facility]  for your care. Our goal is always to provide you with excellent care. Hearing back from our patients is one way we can continue to improve our services. Please take a few minutes to complete the written survey that you may receive in the mail after your visit with us. Thank you!             Your Updated Medication List - Protect others around you: Learn how to safely use, store and throw away your medicines at www.disposemymeds.org.          This list is accurate as of 3/27/18  4:04 PM.  Always use your most recent med list.                   Brand Name Dispense Instructions for use Diagnosis    SEROQUEL PO       Right ankle pain       valACYclovir 1000 mg tablet    VALTREX    16 tablet    Take 2 tabs by mouth when symptoms of cold sores start, and 2 tabs 12 hours later, for total of 1 day of treatment.    Recurrent cold sores

## 2018-03-27 NOTE — LETTER
"3/27/2018       RE: Justo Cardoza  [address]   [place] [address]     Dear Colleague,    Thank you for referring your patient, Justo Cardoza, to the [facility] at [facility]. Please see a copy of my visit note below.    HISTORY OF PRESENT ILLNESS  Mr. Cardoza is a pleasant 32 year old year old male who presents to clinic today with f/u for left hand fracture. Has completed hand therapy and is doing strengthening exercises. No complaints today and no pain.      MEDICAL HISTORY  Patient Active Problem List   Diagnosis     Right ankle pain       Current Outpatient Prescriptions   Medication Sig Dispense Refill     valACYclovir (VALTREX) 1000 mg tablet Take 2 tabs by mouth when symptoms of cold sores start, and 2 tabs 12 hours later, for total of 1 day of treatment. 16 tablet 0     QUEtiapine Fumarate (SEROQUEL PO)          No Known Allergies    No family history on file.    Additional medical/Social/Surgical histories reviewed in [facility] and updated as appropriate.     REVIEW OF SYSTEMS (3/27/2018)  10 point ROS of systems including Constitutional, Eyes, Respiratory, Cardiovascular, Gastroenterology, Genitourinary, Integumentary, Musculoskeletal, Psychiatric were all negative except for pertinent positives noted in my HPI.     PHYSICAL EXAM  Vitals:    03/27/18 1549   Weight: 77.1 kg (170 lb)   Height: 1.778 m (5' 10\")       Vital Signs: Ht 1.778 m (5' 10\")  Wt 77.1 kg (170 lb)  BMI 24.39 kg/m2 Patient declined being weighed. Body mass index is 24.39 kg/(m^2).    General  - normal appearance, in no obvious distress  CV  - normal radial pulse  Pulm  - normal respiratory pattern, non-labored  Musculoskeletal - left hand/wrist  - inspection: normal joint alignment, no obvious deformity, no swelling  - palpation: no bony or soft tissue tenderness, no tenderness at the anatomical snuffbox  - ROM:  90 deg flexion   70 deg extension   25 deg abduction   65 " deg adduction  - strength: 5/5  strength, 5/5 flexion, extension, pronation, supination, adduction, abduction  - special tests:  (-) Tinel's  (-) Finkelstein  (-) Phalen  (-) Mccartney click test  (-) ulnar impaction  Neuro  - no sensory or motor deficit, grossly normal coordination, normal muscle tone  Skin  - no ecchymosis, erythema, warmth, or induration, no obvious rash  Psych  - interactive, appropriate, normal mood and affect    ASSESSMENT & PLAN  33 yo male with left hand fracture, healed  Cont. HEP for strengthening  F/u PRN if condition doesn't improve    Mark Anthony Alvares MD, CAQSM

## 2018-03-27 NOTE — PROGRESS NOTES
"HISTORY OF PRESENT ILLNESS  Mr. Cardoza is a pleasant 32 year old year old male who presents to clinic today with f/u for left hand fracture. Has completed hand therapy and is doing strengthening exercises. No complaints today and no pain.      MEDICAL HISTORY  Patient Active Problem List   Diagnosis     Right ankle pain       Current Outpatient Prescriptions   Medication Sig Dispense Refill     valACYclovir (VALTREX) 1000 mg tablet Take 2 tabs by mouth when symptoms of cold sores start, and 2 tabs 12 hours later, for total of 1 day of treatment. 16 tablet 0     QUEtiapine Fumarate (SEROQUEL PO)          No Known Allergies    No family history on file.    Additional medical/Social/Surgical histories reviewed in [facility] and updated as appropriate.     REVIEW OF SYSTEMS (3/27/2018)  10 point ROS of systems including Constitutional, Eyes, Respiratory, Cardiovascular, Gastroenterology, Genitourinary, Integumentary, Musculoskeletal, Psychiatric were all negative except for pertinent positives noted in my HPI.     PHYSICAL EXAM  Vitals:    03/27/18 1549   Weight: 77.1 kg (170 lb)   Height: 1.778 m (5' 10\")       Vital Signs: Ht 1.778 m (5' 10\")  Wt 77.1 kg (170 lb)  BMI 24.39 kg/m2 Patient declined being weighed. Body mass index is 24.39 kg/(m^2).    General  - normal appearance, in no obvious distress  CV  - normal radial pulse  Pulm  - normal respiratory pattern, non-labored  Musculoskeletal - left hand/wrist  - inspection: normal joint alignment, no obvious deformity, no swelling  - palpation: no bony or soft tissue tenderness, no tenderness at the anatomical snuffbox  - ROM:  90 deg flexion   70 deg extension   25 deg abduction   65 deg adduction  - strength: 5/5  strength, 5/5 flexion, extension, pronation, supination, adduction, abduction  - special tests:  (-) Tinel's  (-) Finkelstein  (-) Phalen  (-) Mccartney click test  (-) ulnar impaction  Neuro  - no sensory or motor deficit, grossly normal coordination, normal " muscle tone  Skin  - no ecchymosis, erythema, warmth, or induration, no obvious rash  Psych  - interactive, appropriate, normal mood and affect    ASSESSMENT & PLAN  31 yo male with left hand fracture, healed  Cont. HEP for strengthening  F/u PRN if condition doesn't improve    Mark Anthony Alvares MD, CAQSM

## 2018-05-22 ENCOUNTER — MYC MEDICAL ADVICE (OUTPATIENT)
Dept: INTERNAL MEDICINE | Facility: CLINIC | Age: 33
End: 2018-05-22

## 2018-05-22 DIAGNOSIS — B00.1 RECURRENT COLD SORES: ICD-10-CM

## 2018-05-22 RX ORDER — VALACYCLOVIR HYDROCHLORIDE 1 G/1
TABLET, FILM COATED ORAL
Qty: 16 TABLET | Refills: 0 | Status: SHIPPED | OUTPATIENT
Start: 2018-05-22 | End: 2019-02-28

## 2019-02-28 ENCOUNTER — OFFICE VISIT (OUTPATIENT)
Dept: INTERNAL MEDICINE | Facility: CLINIC | Age: 34
End: 2019-02-28
Payer: COMMERCIAL

## 2019-02-28 VITALS
HEART RATE: 81 BPM | OXYGEN SATURATION: 97 % | WEIGHT: 163.4 LBS | SYSTOLIC BLOOD PRESSURE: 109 MMHG | DIASTOLIC BLOOD PRESSURE: 69 MMHG | BODY MASS INDEX: 23.39 KG/M2 | HEIGHT: 70 IN

## 2019-02-28 DIAGNOSIS — R33.9 URINARY RETENTION WITH INCOMPLETE BLADDER EMPTYING: Primary | ICD-10-CM

## 2019-02-28 DIAGNOSIS — R33.9 URINARY RETENTION WITH INCOMPLETE BLADDER EMPTYING: ICD-10-CM

## 2019-02-28 DIAGNOSIS — B00.1 RECURRENT COLD SORES: ICD-10-CM

## 2019-02-28 LAB
ANION GAP SERPL CALCULATED.3IONS-SCNC: 7 MMOL/L (ref 3–14)
BUN SERPL-MCNC: 22 MG/DL (ref 7–30)
CALCIUM SERPL-MCNC: 9.1 MG/DL (ref 8.5–10.1)
CHLORIDE SERPL-SCNC: 105 MMOL/L (ref 94–109)
CO2 SERPL-SCNC: 28 MMOL/L (ref 20–32)
CREAT SERPL-MCNC: 1.1 MG/DL (ref 0.66–1.25)
GFR SERPL CREATININE-BSD FRML MDRD: 86 ML/MIN/{1.73_M2}
GLUCOSE SERPL-MCNC: 85 MG/DL (ref 70–99)
POTASSIUM SERPL-SCNC: 4.1 MMOL/L (ref 3.4–5.3)
SODIUM SERPL-SCNC: 139 MMOL/L (ref 133–144)

## 2019-02-28 RX ORDER — QUETIAPINE FUMARATE 50 MG/1
25 TABLET, FILM COATED ORAL DAILY
Refills: 0 | COMMUNITY
Start: 2018-11-12 | End: 2022-06-06

## 2019-02-28 RX ORDER — VALACYCLOVIR HYDROCHLORIDE 1 G/1
TABLET, FILM COATED ORAL
Qty: 16 TABLET | Refills: 0 | Status: SHIPPED | OUTPATIENT
Start: 2019-02-28 | End: 2019-05-27

## 2019-02-28 ASSESSMENT — MIFFLIN-ST. JEOR: SCORE: 1683.68

## 2019-02-28 ASSESSMENT — PAIN SCALES - GENERAL: PAINLEVEL: NO PAIN (0)

## 2019-02-28 NOTE — PATIENT INSTRUCTIONS
[facility]         Internal Medicine Resident                   Continuity Clinic    Who We Are    Resident Continuity Clinic is a part of the [facility] Primary [facility].  Resident physicians see patients independently and establish a relationship with them over the course of their three-year residency program.  As with the Primary Care Clinic, our Resident Continuity Clinic models a group practice.  If your doctor is not available, you will be able to see another resident physician.  At the end of a resident s training, patients will be transitioned to a new resident physician for ongoing care.     We treat patients with a wide array of medical needs from routine physicals, to acute illnesses, to diabetes and blood pressure management, to complex medical illness.  What is a Resident Physician?    Resident physicians hold medical degrees and are doctors. They are training to become specialists in Internal Medicine. They work under the supervision of board-certified faculty physicians.  Expectations for Your Care    We strive to provide accessible, quality care at all times.    In order to provide this care, it is best to see your primary care resident doctor consistently rather switching between providers.  In the event you do see another physician, you should schedule a follow-up visit with your usual primary care doctor.    If you are transitioning your care from another clinic, it is helpful to have your records available for your doctor to review.    We do not prescribe controlled substances, such as ADD medications or narcotic pain medications, on your first visit.  We will review your health records and concerns prior to devising a treatment plan with you in order to provide the best care.      Clinic Services     Extended clinic hours; patient  to help navigate your visit;  parking; laboratory and imaging services with evening and weekend hours    Multiple medical and  surgical specialties in one building    Complementary services, including Nutrition, Integrative Medicine, Pharmacy consultations, Mental and Behavioral Health, Sports Medicine and Physical Therapy    Thank You    We would like to thank you for choosing the [facility] Internal Medicine Resident Continuity Clinic for your primary care. You are making a priceless contribution to the training of the next generation of health care practitioners.     Contact us at [phone] for appointments or questions.    Resident Clinic Hours are Tuesdays and Thursdays, 7:30am-5:00pm    Residents   Mark Cadena MD   (Male)   Denise Meza MD  (Female)  Ekaterina Ritchie MD   (Female)   Ludmila Robles MD   (Female)   Júnior Jefferson MD  (Male)   Osmany Sterling MD  (Male)   Kenzie Ching MD    (Female)   Cesar Epperson MD  (Male)   Abner Nguyen MD  (Male)    Kayli Correia MD  (Female)   Maxim Escalante MD  (Male)   Christine Lou MD  (Female)   Angela Tobar MD    (Female)   Norbert Ayala MD  (Male)   David Nj MD  (Male)   Osmany Seaman MD (Male)   Gorge Parada MD  (Male)   Nanette Pittman MD (Female)    Aimee Mcdowell MD (Female)   Rubina Amato MD  (Male)   Cheryl Wise MD    (Female)   Hattie Wong MD  (Female)    Supervising Physicians   MD Khloe Hemphill MD Briar Duffy, MD James Langland, MD Mary Logeais, MD Tanya Melnik, MD Charles Moldow, MD Heather Thompson Buum, MD Kathleen Watson, MD          Primary Care Center Medication Refill Request Information:  * Please contact your pharmacy regarding ANY request for medication refills.  ** [facility] Prescription Fax = [phone]  * Please allow 3 business days for routine medication refills.  * Please allow 5 business days for controlled substance medication refills.     Primary Care Center Test Result notification information:  *You will be notified with in 7-10 days of your appointment day  regarding the results of your test.  If you are on MyChart you will be notified as soon as the provider has reviewed the results and signed off on them.    [facility] ([place]): [phone]

## 2019-02-28 NOTE — NURSING NOTE
Chief Complaint   Patient presents with     Prostate Problem     Pt is here to discuss concerns about prostate.      Vianney Shipley LPN at 12:20 PM on 2/28/2019.

## 2019-03-01 ENCOUNTER — PRE VISIT (OUTPATIENT)
Dept: UROLOGY | Facility: CLINIC | Age: 34
End: 2019-03-01

## 2019-03-01 NOTE — PROGRESS NOTES
"                     PRIMARY CARE CENTER       SUBJECTIVE:  Justo Cardoza is a 33 year old male with PMHx significant for bipolar disorder comes in due to urinary symptoms.     He states that for the last few months and possibly years he has noticed that following voiding, he continues to have dribbling of urine. This occurs almost every time he needs to urinated. He reports that before the dribbling he feels that he successfully emptied his bladder and has no sensation for urgency.     The dribbling lasts for a up to a minute. It is improved if he applies pressure between his testicles upward.     Denies dysuria, hematuria, incontinence or change in urinary frequency.     Does not have additional symptoms.     Of note, he states that his father and grandfather suffered from prostate enlargement and he is worried about this.     Medications and allergies reviewed by me today.     ROS:   Constitutional, HEENT, cardiovascular, pulmonary, gi and gu systems are negative, except as otherwise noted.    OBJECTIVE:    /69   Pulse 81   Ht 1.78 m (5' 10.08\")   Wt 74.1 kg (163 lb 6.4 oz)   SpO2 97%   BMI 23.39 kg/m     Wt Readings from Last 1 Encounters:   02/28/19 74.1 kg (163 lb 6.4 oz)       GENERAL APPEARANCE: healthy, alert and no distress     EYES: EOMI,  PERRL     HENT: ear canals and TM's normal and nose and mouth without ulcers or lesions     NECK: no adenopathy, no asymmetry, masses, or scars and thyroid normal to palpation     RESP: lungs clear to auscultation - no rales, rhonchi or wheezes     CV: regular rates and rhythm, normal S1 S2, no S3 or S4 and no murmur, click or rub     ABDOMEN:  soft, nontender, no HSM or masses and bowel sounds normal     MS: extremities normal- no gross deformities noted, no evidence of inflammation in joints, FROM in all extremities.     SKIN: no suspicious lesions or rashes     NEURO: Normal strength and tone, sensory exam grossly normal, mentation intact and speech " normal     PSYCH: mentation appears normal. and affect normal/bright     LYMPHATICS: No cervical adenopathy     ASSESSMENT/PLAN:    Justo was seen today for prostate problem.    Diagnoses and all orders for this visit:    Urinary retention with incomplete bladder emptying  -     Basic metabolic panel; Future  -     UROLOGY ADULT REFERRAL  -     MEASURE POST-VOID RESIDUAL URINE/BLADDER CAPACITY, US NON-IMAGING    Recurrent cold sores  -     valACYclovir (VALTREX) 1000 mg tablet; Take 2 tabs by mouth when symptoms of cold sores start, and 2 tabs 12 hours later, for total of 1 day of treatment.     Pt should return to clinic for f/u with me as needed      Júnior Jefferson MD  Feb 28, 2019    Pt was discussed with Dr. Prieto.     While the patient was in clinic, I reviewed the pertinent medical history and results.  I discussed the current findings on physical examination, as well as the patient s diagnosis and treatment plan with the resident and agree with the information as documented with the following exceptions: none.  Alva Prieto MD

## 2019-03-01 NOTE — TELEPHONE ENCOUNTER
MEDICAL RECORDS REQUEST   [facility]  Urology Clinic  [address] [place] [address]  PHONE: [phone]  Fax: [phone]        FUTURE VISIT INFORMATION                                                   Justo Cardoza YOB: 1985 scheduled for future visit at [facility]    APPOINTMENT INFORMATION:    Date: 2019    Provider:  LEONEL BELTRAN    Reason for Visit/Diagnosis: NP    REFERRAL INFORMATION:    Referring provider:  RICHARD CARMICHAEL    Specialty: MD    Referring providers clinic:  [facility] contact number:  [phone];     RECORDS REQUESTED FOR VISIT                                                     NOTES  STATUS/DETAILS   OFFICE NOTE from referring provider  yes   OFFICE NOTE from other specialist  no   DISCHARGE SUMMARY from hospital  no   DISCHARGE REPORT from the ER  no   OPERATIVE REPORT  no   MEDICATION LIST  yes       PRE-VISIT CHECKLIST      Record collection complete Yes   Appointment appropriately scheduled           (right time/right provider) Yes   MyChart activation Yes   Questionnaire complete If no, please explain IN PROCESS     Completed by: Lauren Nye

## 2019-03-06 ENCOUNTER — PRE VISIT (OUTPATIENT)
Dept: UROLOGY | Facility: CLINIC | Age: 34
End: 2019-03-06

## 2019-03-08 ENCOUNTER — OFFICE VISIT (OUTPATIENT)
Dept: UROLOGY | Facility: CLINIC | Age: 34
End: 2019-03-08
Attending: INTERNAL MEDICINE
Payer: COMMERCIAL

## 2019-03-08 VITALS
DIASTOLIC BLOOD PRESSURE: 67 MMHG | HEART RATE: 70 BPM | WEIGHT: 165.5 LBS | BODY MASS INDEX: 23.69 KG/M2 | SYSTOLIC BLOOD PRESSURE: 113 MMHG | HEIGHT: 70 IN

## 2019-03-08 DIAGNOSIS — N39.43 POST-VOID DRIBBLING: Primary | ICD-10-CM

## 2019-03-08 ASSESSMENT — PAIN SCALES - GENERAL: PAINLEVEL: NO PAIN (0)

## 2019-03-08 ASSESSMENT — MIFFLIN-ST. JEOR: SCORE: 1693.22

## 2019-03-08 NOTE — PROGRESS NOTES
Urology Consult History and Physical    Name: Justo Cardoza    MRN: [id number]   YOB: 1985               Chief Complaint:   Post-void dribbling          History of Present Illness:   Justo Cardoza is a 33 year old male who presents for evaluation of postvoid dribbling. His postvoid dribbling has been going on for the past few years. It typically occurs for about one minute after he is done urinating. To address this, he has tried shaking his penis after voiding, dabbing with toilet paper, milking his urethra, and applying pressure upward between his testicles. He denies dysuria, hematuria, or incontinence.     He states that he had an inguinal hernia repair when he was a child, along with surgical repair of a hydrocele. He has no significant history of UTIs. He feels that he has always needed to urinate frequently, but thinks his frequency has possibly increased in recent years, although he feels it is difficult to tell. He gets up once during the night to void. He has had no significant lifestyle or dietary changes to speak of. His fluid intake has always remained consistent. He has no history of treated STIs, and is not concerned for current infection.      He is also concerned about prostate health given a significant family history in his father and grandfather of enlarged prostate.           Past Medical History:   Inguinal hernia--childhood         Past Surgical History:   Inguinal hernia repair         Social History:     Social History     Tobacco Use     Smoking status: Never Smoker     Smokeless tobacco: Never Used   Substance Use Topics     Alcohol use: Not on file            Family History:   No family history on file.         Allergies:   No Known Allergies         Medications:     Current Outpatient Medications   Medication Sig     QUEtiapine (SEROQUEL) 50 MG tablet Take 25 mg by mouth daily      valACYclovir (VALTREX) 1000 mg tablet Take 2 tabs by mouth when symptoms of cold sores start,  and 2 tabs 12 hours later, for total of 1 day of treatment.     No current facility-administered medications for this visit.              Review of Systems:    ROS: 10 point ROS neg other than the symptoms noted above in the HPI.          Physical Exam:   VS:  T: Data Unavailable    HR: 70    BP: 113/67    RR: Data Unavailable   GEN:  AOx3.  NAD.  Pleasant.  HEENT:  Sclerae anicteric.  Conjunctivae pink.  MMM.  NECK:  Supple.  No LAD.  CV:  RRR  LUNGS: Non-labored breathing.  BACK:  No midline or CVA tenderness.  ABD:  Soft.  NT.  ND.  No rebound or guarding.  No masses.  :  Circumcised.  Normal penile shaft.  Testicles descended bilaterally, no nodules or tenderness.  No inguinal hernias.  KENYA:  Prostate smooth, symmetric, no nodules.   EXT:  Warm, well perfused.   SKIN:  Warm.  Dry.  No rashes.  NEURO:  CN grossly intact.            Uroflow and Post-void residual:   Volume: 482 mL  Qmax: 19.3 mL  Qmean: 14.3 mL  Curve: Continuous bell-shaped  PVR: 30 mL         Impression and Plan:   Impression:   Justo Cardoza is a 33 year old male with postvoid dribbling that has been going on for the past few years. Exam is unremarkable today. Uroflow/PVR showing good flow and bladder emptying.      Plan:   -Monitor this for now  -Follow up with cystoscopy with Luc Roberson, Rick, or Ministerio if this continues or symptoms worsen/change       Shweta Harris, CNP

## 2019-03-08 NOTE — LETTER
RE: Justo Cardoza  [address]  [place] [address]     Dear Colleague,    Thank you for referring your patient, Justo Cardoza, to the [facility] UROLOGY AND INST FOR PROSTATE AND UROLOGIC CANCERS at [facility]. Please see a copy of my visit note below.    Urology Consult History and Physical    Name: Justo Cardoza    MRN: [id number]   YOB: 1985               Chief Complaint:   Post-void dribbling          History of Present Illness:   Justo Cardoza is a 33 year old male who presents for evaluation of postvoid dribbling. His postvoid dribbling has been going on for the past few years. It typically occurs for about one minute after he is done urinating. To address this, he has tried shaking his penis after voiding, dabbing with toilet paper, milking his urethra, and applying pressure upward between his testicles. He denies dysuria, hematuria, or incontinence.     He states that he had an inguinal hernia repair when he was a child, along with surgical repair of a hydrocele. He has no significant history of UTIs. He feels that he has always needed to urinate frequently, but thinks his frequency has possibly increased in recent years, although he feels it is difficult to tell. He gets up once during the night to void. He has had no significant lifestyle or dietary changes to speak of. His fluid intake has always remained consistent. He has no history of treated STIs, and is not concerned for current infection.      He is also concerned about prostate health given a significant family history in his father and grandfather of enlarged prostate.           Past Medical History:   Inguinal hernia--childhood         Past Surgical History:   Inguinal hernia repair         Social History:     Social History     Tobacco Use     Smoking status: Never Smoker     Smokeless tobacco: Never Used   Substance Use Topics     Alcohol use: Not on file            Family  History:   No family history on file.         Allergies:   No Known Allergies         Medications:     Current Outpatient Medications   Medication Sig     QUEtiapine (SEROQUEL) 50 MG tablet Take 25 mg by mouth daily      valACYclovir (VALTREX) 1000 mg tablet Take 2 tabs by mouth when symptoms of cold sores start, and 2 tabs 12 hours later, for total of 1 day of treatment.     No current facility-administered medications for this visit.           Physical Exam:   VS:  T: Data Unavailable    HR: 70    BP: 113/67    RR: Data Unavailable   GEN:  AOx3.  NAD.  Pleasant.  HEENT:  Sclerae anicteric.  Conjunctivae pink.  MMM.  NECK:  Supple.  No LAD.  CV:  RRR  LUNGS: Non-labored breathing.  BACK:  No midline or CVA tenderness.  ABD:  Soft.  NT.  ND.  No rebound or guarding.  No masses.  :  Circumcised.  Normal penile shaft.  Testicles descended bilaterally, no nodules or tenderness.  No inguinal hernias.  KENYA:  Prostate smooth, symmetric, no nodules.   EXT:  Warm, well perfused.   SKIN:  Warm.  Dry.  No rashes.  NEURO:  CN grossly intact.            Uroflow and Post-void residual:   Volume: 482 mL  Qmax: 19.3 mL  Qmean: 14.3 mL  Curve: Continuous bell-shaped  PVR: 30 mL         Impression and Plan:   Impression:   Justo Cardoza is a 33 year old male with postvoid dribbling that has been going on for the past few years. Exam is unremarkable today. Uroflow/PVR showing good flow and bladder emptying.      Plan:   -Monitor this for now  -Follow up with cystoscopy with Luc Roberson, Rick, or Ministerio if this continues or symptoms worsen/change       Shweta Harris, CNP

## 2019-03-08 NOTE — NURSING NOTE
"Chief Complaint   Patient presents with     Consult     dribbling after urination       Blood pressure 113/67, pulse 70, height 1.78 m (5' 10.08\"), weight 75.1 kg (165 lb 8 oz). Body mass index is 23.69 kg/m .    Patient Active Problem List   Diagnosis     Right ankle pain       No Known Allergies    Current Outpatient Medications   Medication Sig Dispense Refill     QUEtiapine (SEROQUEL) 50 MG tablet Take 25 mg by mouth daily   0     valACYclovir (VALTREX) 1000 mg tablet Take 2 tabs by mouth when symptoms of cold sores start, and 2 tabs 12 hours later, for total of 1 day of treatment. 16 tablet 0       Social History     Tobacco Use     Smoking status: Never Smoker     Smokeless tobacco: Never Used   Substance Use Topics     Alcohol use: Not on file     Drug use: Not on file       Tahira Anton LPN  3/8/2019  3:48 PM     "

## 2019-03-26 ENCOUNTER — DOCUMENTATION ONLY (OUTPATIENT)
Dept: OTHER | Facility: CLINIC | Age: 34
End: 2019-03-26

## 2019-05-27 ENCOUNTER — MYC REFILL (OUTPATIENT)
Dept: INTERNAL MEDICINE | Facility: CLINIC | Age: 34
End: 2019-05-27

## 2019-05-27 DIAGNOSIS — B00.1 RECURRENT COLD SORES: ICD-10-CM

## 2019-05-30 RX ORDER — VALACYCLOVIR HYDROCHLORIDE 1 G/1
TABLET, FILM COATED ORAL
Qty: 16 TABLET | Refills: 1 | Status: SHIPPED | OUTPATIENT
Start: 2019-05-30 | End: 2019-11-14

## 2019-05-30 NOTE — TELEPHONE ENCOUNTER
valACYclovir (VALTREX) 1000 mg tablet  Take 2 tabs by mouth when symptoms of cold sores start, and 2 tabs 12 hours later, for total of 1 day of treatment  Last Written Prescription Date:  2/28/2019  Last Fill Quantity: 16,   # refills: 0  Last Office Visit : 2/28/2019  Future Office visit:  None  Cr done 2/28/2019    Medication refilled.

## 2019-07-09 ENCOUNTER — OFFICE VISIT (OUTPATIENT)
Dept: INTERNAL MEDICINE | Facility: CLINIC | Age: 34
End: 2019-07-09
Payer: COMMERCIAL

## 2019-07-09 VITALS
SYSTOLIC BLOOD PRESSURE: 127 MMHG | DIASTOLIC BLOOD PRESSURE: 72 MMHG | BODY MASS INDEX: 24.12 KG/M2 | HEART RATE: 51 BPM | WEIGHT: 168.5 LBS

## 2019-07-09 DIAGNOSIS — D22.9 ATYPICAL MOLE: Primary | ICD-10-CM

## 2019-07-09 DIAGNOSIS — M77.8 TENDINITIS OF THUMB: ICD-10-CM

## 2019-07-09 ASSESSMENT — PAIN SCALES - GENERAL: PAINLEVEL: NO PAIN (0)

## 2019-07-09 NOTE — NURSING NOTE
Chief Complaint   Patient presents with     Derm Problem     Concerning mole     Trauma     Sports related injury       Chantal Terrazas LPN at 2:42 PM on July 9, 2019

## 2019-07-09 NOTE — PATIENT INSTRUCTIONS
1) Dermatology referral for mole checks- one on your left shoulder looks like a light seborrhoic keratosis, but you can follow with dermatology. In the meantime, take a photo to aydin progression over the months.    2) Elbow pain- rest, heat and ice, triggered by repetitive movements, due to tenonitis.   3) Rib pain- muscular in nature, ribs are not broken. Stretch the side, heat for muscle relaxation, should go away soon.   4) Thumb- eventually over time the pain should go away, no predicting how long, but feel free to see sports medicine for more ideas on management.      Dermatology [phone] ([facility], 3rd Floor N)            [facility]         Internal Medicine Resident                   Continuity Clinic    Who We Are    Resident Continuity Clinic is a part of the [facility] Primary [facility].  Resident physicians see patients independently and establish a relationship with them over the course of their three-year residency program.  As with the Primary Care Clinic, our Resident Continuity Clinic models a group practice.  If your doctor is not available, you will be able to see another resident physician.  At the end of a resident s training, patients will be transitioned to a new resident physician for ongoing care.     We treat patients with a wide array of medical needs from routine physicals, to acute illnesses, to diabetes and blood pressure management, to complex medical illness.  What is a Resident Physician?    Resident physicians hold medical degrees and are doctors. They are training to become specialists in Internal Medicine. They work under the supervision of board-certified faculty physicians.  Expectations for Your Care    We strive to provide accessible, quality care at all times.    In order to provide this care, it is best to see your primary care resident doctor consistently rather switching between providers.  In the event you do see another physician, you should schedule a  follow-up visit with your usual primary care doctor.    If you are transitioning your care from another clinic, it is helpful to have your records available for your doctor to review.    We do not prescribe controlled substances, such as ADD medications or narcotic pain medications, on your first visit.  We will review your health records and concerns prior to devising a treatment plan with you in order to provide the best care.      Clinic Services     Extended clinic hours; patient  to help navigate your visit;  parking; laboratory and imaging services with evening and weekend hours    Multiple medical and surgical specialties in one building    Complementary services, including Nutrition, Integrative Medicine, Pharmacy consultations, Mental and Behavioral Health, Sports Medicine and Physical Therapy    Thank You    We would like to thank you for choosing the [facility] Internal Medicine Resident Continuity Clinic for your primary care. You are making a priceless contribution to the training of the next generation of health care practitioners.     Contact us at [phone] for appointments or questions.    Resident Clinic Hours are Tuesdays and Thursdays, 7:30am-5:00pm    Residents   Oniel Alba MD  (Male)   Mark Cadena MD   (Male)   Denise Meza MD  (Female)  Ekaterina Ritchie MD   (Female)   Ludmila Robles MD   (Female)    Kenzie Ching MD    (Female)   Cesar Epperson MD  (Male)   Abner Nguyen MD  (Male)    Kayli Correia MD  (Female)   Christine Lou MD  (Female)   Angela Tobar MD    (Female)   Norbert Ayala MD  (Male)   David Nj MD  (Male)   Duc Purvis MD  (Male)   CLARK Alejandro MD   (Male)   Gorge Parada MD  (Male)    Aimee Mcdowell MD (Female)   Maurilio Pinedo MD  (Male)   Yasemin Cid MD  (Male)   Rubina Amato MD  (Male)   Cheryl Wise MD    (Female)   Tone Wright MD  (Female)     Supervising Physicians   MD Dakotah Hemphill,  MD Khloe Mitchell, MD Chris Mcnulty, MD Sage Chawla, MD Emilee Pimentel, MD Alva Prieto, MD Myke Flood, MD Yulisa Avitia MD

## 2019-07-09 NOTE — PROGRESS NOTES
PRIMARY CARE CENTER       SUBJECTIVE:  Justo Cardoza is a 33 year old male with a PMHx of BPD on seroquel who comes in to discuss sports medicine injuries and a mole.     First injury is his left thumb which he jammed while playing basketball.  And no longer hurts except in a very specific position when he is trying to unbuckle his daughter from her car seat.  No effusion no redness no joint tenderness otherwise.  Second injury is left-sided rib pain.  No history of trauma no falls, no inciting event.  Initially heard to lift his daughter and now just hurts to lie on that side to sleep.  Able to take deep breaths, no cough.  Third injury is the right biceps brachii tendon, which is painful from lifting his daughter a lot.  He is to have tennis elbow and that area, but that resolved with massaging and resting.    The mole he noticed his on his right back shoulder.  It is not bothersome to him but his wife noticed it.    Medications and allergies reviewed by me today.     ROS:   Constitutional, HEENT, cardiovascular, pulmonary, gi and gu systems are negative, except as otherwise noted.    OBJECTIVE:    /72   Pulse 51   Wt 76.4 kg (168 lb 8 oz)   BMI 24.12 kg/m     Wt Readings from Last 1 Encounters:   07/09/19 76.4 kg (168 lb 8 oz)       GENERAL APPEARANCE: healthy, alert and no distress     EYES: EOMI,  PERRL     HENT: ear canals and TM's normal and nose and mouth without ulcers or lesions     NECK: no adenopathy, no asymmetry, masses, or scars and thyroid normal to palpation     RESP: lungs clear to auscultation - no rales, rhonchi or wheezes     CV: regular rates and rhythm, normal S1 S2, no S3 or S4 and no murmur, click or rub     ABDOMEN:  soft, nontender, no HSM or masses and bowel sounds normal     MS: extremities normal- no gross deformities noted, no evidence of inflammation in joints, FROM in all extremities.  Some tender numbness and palpation of the brachioradialis tendon on  the right side no muscular tenderness surrounding that area.  Squeezing the rib cage on all sides, no evidence of flail chest or rib fractures, no pain elicited with this.  No abnormalities noted of left thumb PIP joint.  No tenderness elicited.     SKIN: no suspicious lesions or rashes.  Multiple moles.  On the back of the right shoulder there is an elongated 2 mm x 8 mm patch that has texture and is slightly raised, without hair growing in it, single tan color.  Multiple dark moles scattered throughout the rest of the body.     NEURO: Normal strength and tone, sensory exam grossly normal, mentation intact and speech normal     PSYCH: mentation appears normal. and affect normal/bright     LYMPHATICS: No cervical adenopathy     ASSESSMENT/PLAN:    Justo was seen today for derm problem and trauma.    Diagnoses and all orders for this visit:    Atypical mole- appears to be an SK- stuck on appearance. Patient with multiple atypical appearing moles however, and prefer to refer to derm for full skin check.   -     DERMATOLOGY REFERRAL    Multiple sports injury concerns:  Painful left thumb due to prior trauma  Muscular pain of the intercostals of the left side  Tendinitis of the brachioradialis tendon.  Largely provided reassurance.  Thumb should improve without intervention.  Recommended warmth and stretching for the muscular pain of the left side.  Recommended rest massage and ice/warm to the tendinitis, and to rest that arm.    Pt should return to clinic for f/u with me as needed    Ashlyn Mcdowell MD  Jul 9, 2019    Plan of care discussed with Dr. Pimentel.     While the patient was in clinic, I reviewed the pertinent medical history and results.  I discussed the current findings on physical examination, as well as the patient s diagnosis and treatment plan with the resident and agree with the information as documented with the following exceptions: none.  Emilee Pimentel MD  Internal Medicine

## 2019-07-30 ENCOUNTER — OFFICE VISIT (OUTPATIENT)
Dept: DERMATOLOGY | Facility: CLINIC | Age: 34
End: 2019-07-30
Attending: INTERNAL MEDICINE
Payer: COMMERCIAL

## 2019-07-30 DIAGNOSIS — L82.1 SEBORRHEIC KERATOSES: Primary | ICD-10-CM

## 2019-07-30 DIAGNOSIS — D22.9 MULTIPLE BENIGN NEVI: ICD-10-CM

## 2019-07-30 DIAGNOSIS — L81.4 LENTIGINES: ICD-10-CM

## 2019-07-30 DIAGNOSIS — Z12.83 SKIN CANCER SCREENING: ICD-10-CM

## 2019-07-30 ASSESSMENT — PAIN SCALES - GENERAL: PAINLEVEL: NO PAIN (0)

## 2019-07-30 NOTE — PATIENT INSTRUCTIONS
The ABCDEs of Melanoma    Skin cancer can develop anywhere on the skin. Ask someone for help when checking your skin, especially in hard to see places. If you notice a mole different from others, or that changes, enlarges, itches, or bleeds (even if it is small), you should see a dermatologist.                  Sun protective clothing and Resources     Yumber (www.Xylogenics)  Athleta (www.Ulaola)  [employer] (www.Spongecell)  Carve Designs (PatientFocus) - affordable  Skinz (MaXwareskinz.com)    Long sleeve - Yobani Cool DRI UPF 50 or [place] PFG UPF 50  Hoodie - [place] PFG UPF 50  Swimshirt/Rash Guard - Promise UPF 50 (on Amazon)  Neck - Outdoor Research Ubertubes (www.outdoorresMapMyID.com)

## 2019-07-30 NOTE — NURSING NOTE
Dermatology Rooming Note    Justo Cardoza's goals for this visit include:   Chief Complaint   Patient presents with     Skin Check     Justo is here today for a skin check- Justo notes some areas of concern.      ESTEPHANIE Jc

## 2019-07-30 NOTE — PROGRESS NOTES
[facility] Dermatology Note      Dermatology Problem List:  1. Multiple benign nevi    Encounter Date: Jul 30, 2019    CC:  Chief Complaint   Patient presents with     Skin Check     Justo is here today for a skin check- Justo notes some areas of concern.          History of Present Illness:  Mr. Justo Cardoza is a 33 year old male who is new to the clinic and presents for a skin check. The patient was referred to the clinic by Dr. Ashlyn Mcdowell at [facility] for an atypical mole/SK on the shoulder. At today's visit, the patient notes that his wife expressed concern over a mole that she believes is new or has changed recently, and he was referred here following an examination by his primary care doctor. The patient denies a family or personal history of melanoma or skin cancer. He did grow up in [place] and expresses concern over the number of moles he has. Additionally, the patient inquires about the proper procedure to monitor moles to watch for skin cancer. The patient denies painful, itching, tingling or bleeding lesions unless otherwise noted.      Past Medical History:   Patient Active Problem List   Diagnosis     Right ankle pain     History reviewed. No pertinent past medical history.  History reviewed. No pertinent surgical history.    Social History:   reports that he has never smoked. He has never used smokeless tobacco.    Family History:  Family History   Problem Relation Age of Onset     Skin Cancer Father      Skin Cancer Paternal Grandfather        Medications:  Current Outpatient Medications   Medication Sig Dispense Refill     QUEtiapine (SEROQUEL) 50 MG tablet Take 25 mg by mouth daily   0     valACYclovir (VALTREX) 1000 mg tablet Take 2 tabs by mouth when symptoms of cold sores start, and 2 tabs 12 hours later, for total of 1 day of treatment. (Patient not taking: Reported on 7/9/2019) 16 tablet 1       No Known Allergies    Review of Systems:  -Constitutional: The  patient denies fatigue, fevers, chills, unintended weight loss, and night sweats.  -HEENT: Patient denies nonhealing oral sores.  -Skin: As above in HPI. No additional skin concerns.    Physical exam:  Vitals: There were no vitals taken for this visit.  GEN: This is a well developed, well-nourished male in no acute distress, in a pleasant mood.    SKIN: Full skin, which includes the head/face, both arms, chest, back, abdomen,both legs, genitalia and/or groin buttocks, digits and/or nails, was examined.  -Mittal's skin type I, greater than 100 nevi - signature nevus is medium brown in color an round, some papular nevi with central hairs on the back  -There is a waxy stuck on tan to brown papule on the right shoulder.  -Scattered brown macules on sun exposed areas.  -No other lesions of concern on areas examined.       Impression/Plan:  1. Skin cancer screening/Multiple benign nevi    ABCDs of melanoma were discussed and self skin checks were advised.    Sun precaution was advised including the use of sun screens of SPF 30 or higher, sun protective clothing, and avoidance of tanning beds.    Melanoma handout provided    Sunscreen handout provided    Referred to Dr. Villegas for photo finder mole mapping as patient would like to gain a clearer understanding of how to follow his moles and monitor for changes because he has so many and grew up in [place].    2. Seborrheic keratosis, non irritated - right shoulder    Educated on the etiology    Reassured benign    No further intervention required. Patient to report changes.     3. Solar lentigines - sun exposed skin    Educated on the etiology    Reassured benign    No further intervention required. Patient to report changes.     Sunscreen: Apply 20 minutes prior to going outdoors and reapply every two hours, when wet or sweating. We recommend using an SPF 30 or higher, and to use one that is water resistant.       CC Dr. Mcleod, Dr. Ashlyn Mcdowell on close  of this encounter.  Follow-up in prn for new or changing lesions.      Staff Involved:  Staff/Scribe    Scribe Disclosure:  I, Andrea Antonio, am serving as a scribe to document services personally performed by Lyric Wang PA-C, based on data collection and the provider's statements to me.     Provider Disclosure:   The documentation recorded by the scribe accurately reflects the services I personally performed and the decisions made by me.    All risks, benefits and alternatives were discussed with patient.  Patient is in agreement and understands the assessment and plan.  All questions were answered.    Lyric Wang PA-C  [facility]: Phone: [phone], Fax: [phone]

## 2019-07-30 NOTE — LETTER
7/30/2019       RE: Justo Cardoza  [address]  [place] [address]     Dear Colleague,    Thank you for referring your patient, Justo Cardoza, to the [facility] DERMATOLOGY at [facility]. Please see a copy of my visit note below.    [facility] Dermatology Note      Dermatology Problem List:  1. Multiple benign nevi    Encounter Date: Jul 30, 2019    CC:  Chief Complaint   Patient presents with     Skin Check     Justo is here today for a skin check- Justo notes some areas of concern.          History of Present Illness:  Mr. Justo Cardoza is a 33 year old male who is new to the clinic and presents for a skin check. The patient was referred to the clinic by Dr. Ashlyn Mcdowell at [facility] for an atypical mole/SK on the shoulder. At today's visit, the patient notes that his wife expressed concern over a mole that she believes is new or has changed recently, and he was referred here following an examination by his primary care doctor. The patient denies a family or personal history of melanoma or skin cancer. He did grow up in [place] and expresses concern over the number of moles he has. Additionally, the patient inquires about the proper procedure to monitor moles to watch for skin cancer. The patient denies painful, itching, tingling or bleeding lesions unless otherwise noted.      Past Medical History:   Patient Active Problem List   Diagnosis     Right ankle pain     History reviewed. No pertinent past medical history.  History reviewed. No pertinent surgical history.    Social History:   reports that he has never smoked. He has never used smokeless tobacco.    Family History:  Family History   Problem Relation Age of Onset     Skin Cancer Father      Skin Cancer Paternal Grandfather        Medications:  Current Outpatient Medications   Medication Sig Dispense Refill     QUEtiapine (SEROQUEL) 50 MG tablet Take 25 mg by mouth daily    0     valACYclovir (VALTREX) 1000 mg tablet Take 2 tabs by mouth when symptoms of cold sores start, and 2 tabs 12 hours later, for total of 1 day of treatment. (Patient not taking: Reported on 7/9/2019) 16 tablet 1       No Known Allergies    Review of Systems:  -Constitutional: The patient denies fatigue, fevers, chills, unintended weight loss, and night sweats.  -HEENT: Patient denies nonhealing oral sores.  -Skin: As above in HPI. No additional skin concerns.    Physical exam:  Vitals: There were no vitals taken for this visit.  GEN: This is a well developed, well-nourished male in no acute distress, in a pleasant mood.    SKIN: Full skin, which includes the head/face, both arms, chest, back, abdomen,both legs, genitalia and/or groin buttocks, digits and/or nails, was examined.  -Mittal's skin type I, greater than 100 nevi - signature nevus is medium brown in color an round, some papular nevi with central hairs on the back  -There is a waxy stuck on tan to brown papule on the right shoulder.  -Scattered brown macules on sun exposed areas.  -No other lesions of concern on areas examined.       Impression/Plan:  1. Skin cancer screening/Multiple benign nevi    ABCDs of melanoma were discussed and self skin checks were advised.    Sun precaution was advised including the use of sun screens of SPF 30 or higher, sun protective clothing, and avoidance of tanning beds.    Melanoma handout provided    Sunscreen handout provided    Referred to Dr. Villegas for photo finder mole mapping as patient would like to gain a clearer understanding of how to follow his moles and monitor for changes because he has so many and grew up in [place].    2. Seborrheic keratosis, non irritated - right shoulder    Educated on the etiology    Reassured benign    No further intervention required. Patient to report changes.     3. Solar lentigines - sun exposed skin    Educated on the etiology    Reassured benign    No further  intervention required. Patient to report changes.     Sunscreen: Apply 20 minutes prior to going outdoors and reapply every two hours, when wet or sweating. We recommend using an SPF 30 or higher, and to use one that is water resistant.       CC Dr. Mcleod, Dr. Ashlyn Mcdowell on close of this encounter.  Follow-up in prn for new or changing lesions.      Staff Involved:  Staff/Scribe    Scribe Disclosure:  I, Andrea Antonio, am serving as a scribe to document services personally performed by Lyric Wang PA-C, based on data collection and the provider's statements to me.     Provider Disclosure:   The documentation recorded by the scribe accurately reflects the services I personally performed and the decisions made by me.    All risks, benefits and alternatives were discussed with patient.  Patient is in agreement and understands the assessment and plan.  All questions were answered.    Lyric Wang PA-C  [facility]: Phone: [phone], Fax: [phone]

## 2019-09-18 ENCOUNTER — OFFICE VISIT (OUTPATIENT)
Dept: FAMILY MEDICINE | Facility: CLINIC | Age: 34
End: 2019-09-18
Payer: COMMERCIAL

## 2019-09-18 VITALS
DIASTOLIC BLOOD PRESSURE: 80 MMHG | HEART RATE: 60 BPM | SYSTOLIC BLOOD PRESSURE: 100 MMHG | WEIGHT: 168 LBS | TEMPERATURE: 97.9 F | OXYGEN SATURATION: 97 % | BODY MASS INDEX: 24.05 KG/M2 | HEIGHT: 70 IN

## 2019-09-18 DIAGNOSIS — R07.0 THROAT PAIN: Primary | ICD-10-CM

## 2019-09-18 DIAGNOSIS — K14.6 TONGUE PAIN: ICD-10-CM

## 2019-09-18 PROCEDURE — 99213 OFFICE O/P EST LOW 20 MIN: CPT | Performed by: FAMILY MEDICINE

## 2019-09-18 RX ORDER — NYSTATIN 100000/ML
500000 SUSPENSION, ORAL (FINAL DOSE FORM) ORAL 4 TIMES DAILY
Qty: 280 ML | Refills: 0 | Status: SHIPPED | OUTPATIENT
Start: 2019-09-18 | End: 2019-10-01

## 2019-09-18 ASSESSMENT — MIFFLIN-ST. JEOR: SCORE: 1694.32

## 2019-09-18 NOTE — PATIENT INSTRUCTIONS
- nystatin (MYCOSTATIN) 915375 UNIT/ML suspension; Take 5 mLs (500,000 Units) by mouth 4 times daily for 14 days swish in the mouth and retain for as long as possible (several minutes) before swallowing    - if symptoms are not improving then please schedule with ear, nose and throat clinic

## 2019-09-18 NOTE — PROGRESS NOTES
"Subjective     Justo Cardoza is a 34 year old male who presents to clinic today for the following health issues:    Swollen/irritated tongue and swollen tonsils x 1 month, tongue has a white patch and some other discoloration, often tingles, sore throat sometimes leads to coughing. Symptoms worsen at night.    HPI   Acute Illness   Acute illness concerns: sore throat   Onset: over a month ago     Fever: no    Chills/Sweats: no    Headache (location?): no    Sinus Pressure:no    Conjunctivitis:  no    Ear Pain: no    Rhinorrhea: no     Congestion: no     Sore Throat: YES, tongue swollen and tingles, with white spots      Cough: YES-non-productive    Wheeze: no     Decreased Appetite: no     Nausea: no     Vomiting: no     Diarrhea:  no     Dysuria/Freq.: no     Fatigue/Achiness: no     Sick/Strep Exposure: no        Sore throat and tongue pain for one month. A/s with swollen tonsils. Symptoms are worse at night. He takes nqyuil and occasional dayquil. No smoking.     Reviewed and updated as needed this visit by Provider         Review of Systems   ROS COMP: Constitutional, HEENT, cardiovascular, pulmonary, gi and gu systems are negative, except as otherwise noted.      Objective    There were no vitals taken for this visit.  There is no height or weight on file to calculate BMI.  Physical Exam   /80   Pulse 60   Temp 97.9  F (36.6  C) (Oral)   Ht 1.772 m (5' 9.75\")   Wt 76.2 kg (168 lb)   SpO2 97%   BMI 24.28 kg/m    GENERAL: healthy, alert and no distress  EYES: Eyes grossly normal to inspection  HENT: tongue with small white patch   NECK: no adenopathy  RESP: lungs clear to auscultation - no rales, rhonchi or wheezes  CV: regular rate and rhythm, normal S1 S2    Diagnostic Test Results:          Assessment & Plan     1. Throat pain  - nystatin (MYCOSTATIN) [id number] UNIT/ML suspension; Take 5 mLs (500,000 Units) by mouth 4 times daily for 14 days swish in the mouth and retain for as long as possible " (several minutes) before swallowing  Dispense: 280 mL; Refill: 0    2. Tongue pain  - advised to f/u with ENT if symptoms are not improving   - OTOLARYNGOLOGY REFERRAL    Return in about 1 week (around 9/25/2019).    Marco Arita MD  [facility]

## 2019-09-18 NOTE — TELEPHONE ENCOUNTER
FUTURE VISIT INFORMATION      FUTURE VISIT INFORMATION:    Date: 10/8/2019    Time: 1:30 PM    Location: [facility]   REFERRAL INFORMATION:    Referring provider:  Dr. Marco Arita    Referring providers clinic:  [facility]     Reason for visit/diagnosis  Sore throat, pain in tongue/swollen tongue     RECORDS REQUESTED FROM:       Clinic name Comments Records Status Imaging Status   [facility]  9/18/19 Office visit with Dr. Arita Referral [facility]                    9/18/19 Per scheduling staff, no outside medical records for Pt. - Mathew

## 2019-09-30 ENCOUNTER — MYC MEDICAL ADVICE (OUTPATIENT)
Dept: FAMILY MEDICINE | Facility: CLINIC | Age: 34
End: 2019-09-30

## 2019-09-30 DIAGNOSIS — R07.0 THROAT PAIN: ICD-10-CM

## 2019-09-30 NOTE — TELEPHONE ENCOUNTER
Dr Arita - Please advise.  I have asked patient to verify which pharmacy he uses.  It is now loDED  Ninase Enoc RN

## 2019-10-02 RX ORDER — NYSTATIN 100000/ML
500000 SUSPENSION, ORAL (FINAL DOSE FORM) ORAL 4 TIMES DAILY
Qty: 140 ML | Refills: 0 | Status: SHIPPED | OUTPATIENT
Start: 2019-10-02 | End: 2019-10-02

## 2019-10-02 RX ORDER — NYSTATIN 100000/ML
500000 SUSPENSION, ORAL (FINAL DOSE FORM) ORAL 4 TIMES DAILY
Qty: 140 ML | Refills: 0 | Status: SHIPPED | OUTPATIENT
Start: 2019-10-02 | End: 2020-02-17

## 2019-10-08 ENCOUNTER — OFFICE VISIT (OUTPATIENT)
Dept: OTOLARYNGOLOGY | Facility: CLINIC | Age: 34
End: 2019-10-08
Payer: COMMERCIAL

## 2019-10-08 ENCOUNTER — PRE VISIT (OUTPATIENT)
Dept: OTOLARYNGOLOGY | Facility: CLINIC | Age: 34
End: 2019-10-08

## 2019-10-08 VITALS
DIASTOLIC BLOOD PRESSURE: 66 MMHG | SYSTOLIC BLOOD PRESSURE: 126 MMHG | WEIGHT: 167.4 LBS | BODY MASS INDEX: 24.19 KG/M2 | HEART RATE: 54 BPM

## 2019-10-08 DIAGNOSIS — K14.6 TONGUE PAIN: Primary | ICD-10-CM

## 2019-10-08 DIAGNOSIS — B37.0 THRUSH: ICD-10-CM

## 2019-10-08 RX ORDER — CLOTRIMAZOLE 10 MG/1
LOZENGE ORAL
Qty: 35 EACH | Refills: 0 | Status: SHIPPED | OUTPATIENT
Start: 2019-10-08 | End: 2020-02-17

## 2019-10-08 ASSESSMENT — PAIN SCALES - GENERAL: PAINLEVEL: NO PAIN (0)

## 2019-10-08 NOTE — NURSING NOTE
Chief Complaint   Patient presents with     Consult     sore throat,pain in tongue, swollen tongue     Francy Red LPN

## 2019-10-08 NOTE — PROGRESS NOTES
[facility] New Patient Visit Note        Otolaryngology        October 8, 2019    Referring Provider: Marco Arita MD         HPI:  Justo Cardoza is a 34 year old male who presents for evaluation of ongoing tongue pain and a sore throat.    Patient reports approximately 2 months ago he developed a sore throat.  He has some swollen tonsils at that time.  He had pain in the middle of his tongue and a tingling at the tip of his tongue.  Shortly after that started, he noticed an area of white growth in the middle of his tongue.  After symptoms persisted for several weeks, he went in to see his primary care provider because the soreness of his tongue and the white patch stayed.  The sore throat went away.  He also reported his tongue looks swollen at that time.    At the time the incident started, he denies any fevers or chills.  He thinks he might of had a  few swollen lymph nodes at the time.  They are no longer swollen.  He did not have any trouble swallowing or eating.  No weight loss.  No night sweats.  No facial numbness.  No ear pain or plugging.    He was concerned enough that he went into his primary care provider and on 9/18/2019, he was given his first course of nystatin.  He does use a bite guard at night for grinding and as a retainer because he had and there is a line.  He is wondering if that could be contributing as he read about this online.  He does use a denture soaked to clean it.    He states that with the first round of nystatin, symptoms improved drastically.  The sore throat is gone completely.  The white patch is nearly gone but the skin under the white patch is now smooth and does not have taste buds.  He felt that it was appropriate to be checked.  His primary care provider put him back on another round of nystatin on 10/2/2019.  He will finish that today.  He states that the white patches nearly gone.    ROS:  10 point review of systems completed and is negative  except for those listed above    PMH: Bipolar disorder, oral HSV    PSH:   Past Surgical History:   Procedure Laterality Date     GENITOURINARY SURGERY  2009    Hydrocelectomy       FamH:   Family History   Problem Relation Age of Onset     Skin Cancer Father      Depression Father      Skin Cancer Paternal Grandfather      Hypertension Mother      Depression Brother        SocH:   Social History     Tobacco Use     Smoking status: Never Smoker     Smokeless tobacco: Never Used   Substance Use Topics     Alcohol use: None     Drug use: None       Medications:   Current Outpatient Medications   Medication     nystatin (MYCOSTATIN) 379637 UNIT/ML suspension     QUEtiapine (SEROQUEL) 50 MG tablet     valACYclovir (VALTREX) 1000 mg tablet     No current facility-administered medications for this visit.        Allergies:   No Known Allergies      Physical Exam:   /66   Pulse 54   Wt 75.9 kg (167 lb 6.4 oz)   BMI 24.19 kg/m      Constitutional: The patient was unaccompanied, well-groomed, and in no acute distress.    Head: Normocephalic and atraumatic.   Eyes: Pupils were equal and reactive.  Extraocular movement intact.    Ears: Pinnae and tragus non-tender.  EACs and TMs were clear under microscopic exam.   Nose: Sinuses were non-tender.  Anterior rhinoscopy revealed midline septum and absence of purulence or polyps.    Oral Cavity: tongue: there is a 0.5 cm by 1 cm area of smooth tissue along the median sulcus in the middle of the tongue, 1 mm area of white patch on the most posterior portion of this area, no palpable masses or thickeness, non-tender to palpation, normal floor of mouth, buccal mucosa, and palate.  No lesions or masses on inspection or palpation.    Oropharynx: Normal mucosa, palate symmetric with normal elevation. No abnormal lymph tissue in the oropharynx.  Pterygoid region non-tender.   Hypopharynx/Larynx: Deferred for fiberoptic endoscopic exam  Neck: No lymphadenopathy in the neck.  No  palpable thyroid.  Normal range of motion  Skin: Normal:  warm and pink without rash  Neurologic: Alert and oriented x 3.  CN's III-XII within normal limits.  Voice normal.   Psychiatric: The patient's affect was calm, cooperative, and appropriate. .        Fiberoptic Endoscopy:  Consent for fiberoptic laryngoscopy was obtained, and we confirmed correctness of procedure and identity of patient.  Fiberoptic laryngoscopy was indicated due to sore throat x 4 weeks and white patch on tongue.  The nose was topically decongested and anesthetized.  The fiberoptic laryngoscope was passed under endoscopic vision.  The turbinates were normal.  The inferior and middle meati were clear bilaterally without purulence, masses, or polyps.  The nasopharynx was clear.  The Eustachian tubes were clear.  The soft palate appeared normal with good mobility.  The epiglottis was sharp and the visualized portion of the vallecula was clear.  The larynx was clear with mobile cords.  The arytenoids were clear and there was no pooling in the hypopharynx.        Video of endoscopy (please click on image above to watch)          Assessment/Plan:     1. Thrush/tongue lesion.  Patient with an area of what appears to be healing thrush in the median sulcus of the tongue.  No palpable lesion or tenderness on exam.  Nothing of concern on the fiberoptic endoscopy as well.    Patient is quite insistent on switching from something other than nystatin.  We will put him on Mycelex troches 5 times a day for 7 days.  I am to see him back in 2 to 3 weeks to check the area to make sure it has not recurred or the pain has not returned.  If there is recurrence or pain, at that point, we discussed proceeding with a biopsy +/- imaging.  Patient was very comfortable with this plan and will call between now and that time with any new questions or concerns.        Lashae Thapa PA-C  Otolaryngology  Head & Neck Surgery  [phone]      CC:  Marco Arita MD    [facility]

## 2019-10-08 NOTE — PATIENT INSTRUCTIONS
Justo Cardoza,    It was a pleasure to see you today.    1. You were seen in the ENT Clinic today by Lashae Thapa PA-C    If you have any questions or concerns after your appointment, please call   - Option 1: ENT Clinic: [phone]      2. Please stop the Nystati.    Start Mycelex, 1 disc 5 times a day.  Let it dissolve on the tongue.    3.  Return in 2-3 weeks for a recheck.      Thank you,  Lashae Thapa PA-C  Otolaryngology  Head & Neck Surgery  [phone]

## 2019-10-08 NOTE — LETTER
10/8/2019       RE: Justo Cardoza  [address] [facility] [address]     Dear Colleague,    Thank you for referring your patient, Justo Cardoza, to the [facility] EAR NOSE AND THROAT at [facility]. Please see a copy of my visit note below.    [facility] Ear, Nose and Throat Clinic New Patient Visit Note        Otolaryngology        October 8, 2019    Referring Provider: Marco Arita MD         HPI:  Justo Cardoza is a 34 year old male who presents for evaluation of ongoing tongue pain and a sore throat.    Patient reports approximately 2 months ago he developed a sore throat.  He has some swollen tonsils at that time.  He had pain in the middle of his tongue and a tingling at the tip of his tongue.  Shortly after that started, he noticed an area of white growth in the middle of his tongue.  After symptoms persisted for several weeks, he went in to see his primary care provider because the soreness of his tongue and the white patch stayed.  The sore throat went away.  He also reported his tongue looks swollen at that time.    At the time the incident started, he denies any fevers or chills.  He thinks he might of had a  few swollen lymph nodes at the time.  They are no longer swollen.  He did not have any trouble swallowing or eating.  No weight loss.  No night sweats.  No facial numbness.  No ear pain or plugging.    He was concerned enough that he went into his primary care provider and on 9/18/2019, he was given his first course of nystatin.  He does use a bite guard at night for grinding and as a retainer because he had and there is a line.  He is wondering if that could be contributing as he read about this online.  He does use a denture soaked to clean it.    He states that with the first round of nystatin, symptoms improved drastically.  The sore throat is gone completely.  The white patch is nearly gone but the skin under the white patch is now smooth and does not  have taste buds.  He felt that it was appropriate to be checked.  His primary care provider put him back on another round of nystatin on 10/2/2019.  He will finish that today.  He states that the white patches nearly gone.    ROS:  10 point review of systems completed and is negative except for those listed above    PMH: Bipolar disorder, oral HSV    PSH:   Past Surgical History:   Procedure Laterality Date     GENITOURINARY SURGERY  2009    Hydrocelectomy       FamH:   Family History   Problem Relation Age of Onset     Skin Cancer Father      Depression Father      Skin Cancer Paternal Grandfather      Hypertension Mother      Depression Brother        SocH:   Social History     Tobacco Use     Smoking status: Never Smoker     Smokeless tobacco: Never Used   Substance Use Topics     Alcohol use: None     Drug use: None       Medications:   Current Outpatient Medications   Medication     nystatin (MYCOSTATIN) 129763 UNIT/ML suspension     QUEtiapine (SEROQUEL) 50 MG tablet     valACYclovir (VALTREX) 1000 mg tablet     No current facility-administered medications for this visit.        Allergies:   No Known Allergies      Physical Exam:   /66   Pulse 54   Wt 75.9 kg (167 lb 6.4 oz)   BMI 24.19 kg/m       Constitutional: The patient was unaccompanied, well-groomed, and in no acute distress.    Head: Normocephalic and atraumatic.   Eyes: Pupils were equal and reactive.  Extraocular movement intact.    Ears: Pinnae and tragus non-tender.  EACs and TMs were clear under microscopic exam.   Nose: Sinuses were non-tender.  Anterior rhinoscopy revealed midline septum and absence of purulence or polyps.    Oral Cavity: tongue: there is a 0.5 cm by 1 cm area of smooth tissue along the median sulcus in the middle of the tongue, 1 mm area of white patch on the most posterior portion of this area, no palpable masses or thickeness, non-tender to palpation, normal floor of mouth, buccal mucosa, and palate.  No lesions or  masses on inspection or palpation.    Oropharynx: Normal mucosa, palate symmetric with normal elevation. No abnormal lymph tissue in the oropharynx.  Pterygoid region non-tender.   Hypopharynx/Larynx: Deferred for fiberoptic endoscopic exam  Neck: No lymphadenopathy in the neck.  No palpable thyroid.  Normal range of motion  Skin: Normal:  warm and pink without rash  Neurologic: Alert and oriented x 3.  CN's III-XII within normal limits.  Voice normal.   Psychiatric: The patient's affect was calm, cooperative, and appropriate. .        Fiberoptic Endoscopy:  Consent for fiberoptic laryngoscopy was obtained, and we confirmed correctness of procedure and identity of patient.  Fiberoptic laryngoscopy was indicated due to sore throat x 4 weeks and white patch on tongue.  The nose was topically decongested and anesthetized.  The fiberoptic laryngoscope was passed under endoscopic vision.  The turbinates were normal.  The inferior and middle meati were clear bilaterally without purulence, masses, or polyps.  The nasopharynx was clear.  The Eustachian tubes were clear.  The soft palate appeared normal with good mobility.  The epiglottis was sharp and the visualized portion of the vallecula was clear.  The larynx was clear with mobile cords.  The arytenoids were clear and there was no pooling in the hypopharynx.        Video of endoscopy (please click on image above to watch)          Assessment/Plan:     1. Thrush/tongue lesion.  Patient with an area of what appears to be healing thrush in the median sulcus of the tongue.  No palpable lesion or tenderness on exam.  Nothing of concern on the fiberoptic endoscopy as well.    Patient is quite insistent on switching from something other than nystatin.  We will put him on Mycelex troches 5 times a day for 7 days.  I am to see him back in 2 to 3 weeks to check the area to make sure it has not recurred or the pain has not returned.  If there is recurrence or pain, at that point,  we discussed proceeding with a biopsy +/- imaging.  Patient was very comfortable with this plan and will call between now and that time with any new questions or concerns.        Lashae Thapa PA-C  Otolaryngology  Head & Neck Surgery  [phone]      CC:  Marco Arita MD   [facility]

## 2019-10-15 ENCOUNTER — MYC MEDICAL ADVICE (OUTPATIENT)
Dept: OTOLARYNGOLOGY | Facility: CLINIC | Age: 34
End: 2019-10-15

## 2019-10-15 DIAGNOSIS — K14.6 TONGUE PAIN: Primary | ICD-10-CM

## 2019-10-18 RX ORDER — FLUCONAZOLE 100 MG/1
TABLET ORAL
Qty: 16 TABLET | Refills: 0 | Status: SHIPPED | OUTPATIENT
Start: 2019-10-18 | End: 2020-02-17

## 2019-10-18 NOTE — TELEPHONE ENCOUNTER
"LOV: 9/18/2019  ENT on 10/8/2019    Marco Arita MD .     Patient asking for a different antifungal than nystatin and clotrimazole for continued thrush symptoms.     Note from Marco Arita MD on 10/8:  \"ENT appt on 10/8/19. If nystatin has been helpful then I can refill script.\"     Please advise.    Thanks! Lea Martel RN   "

## 2019-10-21 ENCOUNTER — TELEPHONE (OUTPATIENT)
Dept: OTOLARYNGOLOGY | Facility: CLINIC | Age: 34
End: 2019-10-21

## 2019-10-21 RX ORDER — DEXAMETHASONE 0.5 MG/5ML
0.5 ELIXIR ORAL 4 TIMES DAILY
Qty: 300 ML | Refills: 0 | Status: SHIPPED | OUTPATIENT
Start: 2019-10-21 | End: 2019-10-23

## 2019-10-21 NOTE — TELEPHONE ENCOUNTER
Left message with patient and sent Rx to the pharmacy here at [facility].  Lashae Thapa PA-C, 10/21/2019, 8:28 AM

## 2019-10-21 NOTE — TELEPHONE ENCOUNTER
[facility] Call Center    Phone Message    May a detailed message be left on voicemail: yes    Reason for Call: Medication Question or concern regarding medication   Prescription Clarification  Name of Medication: dexamethasone (DECADRON) 0.5 MG/5ML elixir  Prescribing Provider: Dr. Lashae Thapa   Pharmacy:  [facility] [address]   What on the order needs clarification? Azeem with  pharmacy was wanting to ask if this med needed to be liquid form, or if it would be okay to have 0.5mg tablets crushed. Please advise.    Action Taken: Message routed to:  [facility] ([facility] ENT

## 2019-10-21 NOTE — TELEPHONE ENCOUNTER
Will start patient on Dexamethasone elixir, 0.5 mg/5 mL, 5 mL QID x 14 days, dispense #300 mL, no refills.    I also think a biopsy of the lesion at our appointment on Wednesday, 10/23, is appropriate.    Message sent back to MAYELA Sen.    Rx pended until we find where the Rx should be sent.    Lashae Thapa PA-C, 10/21/2019, 7:46 AM

## 2019-10-23 ENCOUNTER — OFFICE VISIT (OUTPATIENT)
Dept: OTOLARYNGOLOGY | Facility: CLINIC | Age: 34
End: 2019-10-23
Payer: COMMERCIAL

## 2019-10-23 VITALS
TEMPERATURE: 97.7 F | DIASTOLIC BLOOD PRESSURE: 88 MMHG | SYSTOLIC BLOOD PRESSURE: 142 MMHG | BODY MASS INDEX: 25.74 KG/M2 | HEART RATE: 83 BPM | HEIGHT: 67 IN | OXYGEN SATURATION: 100 % | WEIGHT: 164 LBS | RESPIRATION RATE: 18 BRPM

## 2019-10-23 DIAGNOSIS — K14.8 TONGUE LESION: Primary | ICD-10-CM

## 2019-10-23 DIAGNOSIS — K14.6 TONGUE PAIN: ICD-10-CM

## 2019-10-23 RX ORDER — LIDOCAINE HYDROCHLORIDE AND EPINEPHRINE 10; 10 MG/ML; UG/ML
20 INJECTION, SOLUTION INFILTRATION; PERINEURAL ONCE
Status: DISCONTINUED | OUTPATIENT
Start: 2019-10-23 | End: 2020-02-17

## 2019-10-23 ASSESSMENT — MIFFLIN-ST. JEOR: SCORE: 1631.4

## 2019-10-23 ASSESSMENT — PAIN SCALES - GENERAL: PAINLEVEL: NO PAIN (0)

## 2019-10-23 NOTE — PATIENT INSTRUCTIONS
Justo Cardoza,    It was a pleasure to see you today.    1. You were seen in the ENT Clinic today by Lashae Thapa PA-C    If you have any questions or concerns after your appointment, please call   - Option 1: ENT Clinic: [phone]    2. The suture in the tongue should dissolve on its own in 4-7 days.  Do not try to remove it.    Soft foods for the next 2-3 days.    3.  Gargle with salt and baking soda gargles after every meal and drink (except water).    8 ounces of water, 1 tsp table salt, 1/4-1/2 tsp baking soda.  Stir the mixture before using.  Gargle and spit.    4.  Use the Dexamethasone solution, 5 mL, swish/gargle and spit, 4 times a day for 14 days.    5. I will call in 3-5 working days, once I have the biopsy results back.      Thank you,  Lashae Thapa PA-C  Otolaryngology  Head & Neck Surgery  [phone]

## 2019-10-23 NOTE — PROGRESS NOTES
"[facility] Ear, Nose and Throat Clinic Follow Up Visit Note  Otolaryngology    October 23, 2019       HPI:  Justo Cardoza is a 34 year old male who presents for a recheck of the lesion on his tongue.      Patient states that the Mycelex Roche originally helped, but the symptoms started to come back.  He started to get white spots again and more pain.  He also noted the area without of the papillae got slightly larger.  He went into see his primary care provider last week while I was out of the office and was placed on fluconazole.  That has helped some of the white spots but has not helped with the actual lesion on the tongue.    It still is not interfering with his swallowing.  Itch is painful.  He had not yet started the dexamethasone rinse.  He did see his dentist yesterday who thought he had thrush as a secondary issue and that this lesion might actually be autoimmune.  Patient has very comfortable with the fact that we have talked about doing a biopsy today in our previous communications and is hoping we can do that was here in clinic.    REVIEW OF SYSTEMS:  10 point ROS was negative other than the symptoms noted above in the HPI.    Physical Exam:  BP (!) 142/88   Pulse 83   Temp 97.7  F (36.5  C)   Resp 18   Ht 1.7 m (5' 6.93\")   Wt 74.4 kg (164 lb)   SpO2 100%   BMI 25.74 kg/m      Constitutional: The patient was unaccompanied, well-groomed, and in no acute distress.    Mouth: Mucosa pink and moist, tonsils non-erythematous, no exudates, uvula midline; 2 cm x 0.5 cm lesion that is de-papillated along the dorsum of the tongue at the median sulcus  Neck: No lymphadenopathy in the neck.  No palpable thyroid.  Normal range of motion  Neurologic: Alert and oriented x 3.  CN's III-XII within normal limits.  Voice normal.   Psychiatric: The patient's affect was calm, cooperative, and appropriate.           PROCEDURE:  BIOPSY: After obtaining informed consent from Mr. Cardoza, we put Mr. Cardoza in a comfortable " position. I injected the biopsy site with 1% lidocaine with 1:100,000 epinephrine. Once anesthesia was ascertained, I used a 4 mm punch biopsy to sample the abnormal tissue.  1 sample were taken and sent for permanent pathology.  Hemostasis was easily assured with pressure and 1, 4-0 chromic gut, simple interrupted suture. The patient tolerated the procedure well.        Assessment/Plan:   1. & 2.  Tongue pain and tongue lesion.    Patient with ongoing issues of what appears to be thrush on a tongue lesion.  There is no palpable mass.  Patient has not yet been able to start the dexamethasone solution rinses.    He has been through 3 antifungal agents and it continues to recur and not to get better.    At this point, we will go ahead and do a punch biopsy of the site.  Consent obtained after discussing the risks and benefits of the procedure.  Procedure as documented above.    Patient is aware that I will call him in 3-5 business days, after I get the biopsy results back.  In the meantime, he will do salt and soda gargles after each meal and drink to prevent infection.  I will also have him start the dexamethasone solution 5 mL by mouth swish and spit, 4 times daily for 14 days.    No further follow-up scheduled at this point because I need to see what the results show.  If it would be malignancy, I would have him meet with 1 of her head neck cancer patients.  If this is inflammatory or autoimmune, I would have him see 1 of the rheumatologist.  If this is viral, will treat with antivirals.  If it turns out to be ongoing fungal, I may need to extend the fluconazole prescription.      Addendum   Pathology of the tongue biopsy:  - Benign squamous mucosa with hyperparakeratosis (leukoplakia), acute   inflammation and reactive change.   - PAS special stain is negative for fungal organisms.   - Negative for dysplasia or malignancy.    Note to patient on MyChart:  Here are the pathology results.  This shows that it is NOT  "cancer.  No fungal organisms, which explains why the antifungals are not taking care of the spot without the \"taste buds\".      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  Leukoplakia needs to be monitored.  We should look at the site again in 3 months.  Please call to set up that appointment:  [phone].    As for the inflammation/reactive changes, the Dexamethasone soln should take care of that.  Please use it for the 14 days.  If the area has not improved, please call and let me know.  I would then refer you to Rheumatology.    If the lesion goes away and then comes back again, let me know and then I will refer to Rheumatology at that time.  Lashae Thapa PA-C, 10/28/2019, 7:35 AM          Lashae Thapa PA-C  Otolaryngology  Head & Neck Surgery  [phone]      CC:  Marco Arita MD   [facility]  "

## 2019-10-23 NOTE — TELEPHONE ENCOUNTER
Spoke to christina at  pharmacy regarding message. Advised christina that per discussion with provider, the medication would be needed in liquid form as pt is supposed to gargle with it. Christina stated that this specific medication and dosage has been discontinued.advised I would touch base with provider for other options.

## 2019-10-23 NOTE — LETTER
"10/23/2019       RE: Justo Cardoza  [address] [facility] [address]     Dear Colleague,    Thank you for referring your patient, Justo Cardoza, to the [facility] EAR NOSE AND THROAT at [facility]. Please see a copy of my visit note below.    [facility] Ear, Nose and Throat Clinic Follow Up Visit Note  Otolaryngology    October 23, 2019       HPI:  Justo Cardoza is a 34 year old male who presents for a recheck of the lesion on his tongue.      Patient states that the Mycelex Roche originally helped, but the symptoms started to come back.  He started to get white spots again and more pain.  He also noted the area without of the papillae got slightly larger.  He went into see his primary care provider last week while I was out of the office and was placed on fluconazole.  That has helped some of the white spots but has not helped with the actual lesion on the tongue.    It still is not interfering with his swallowing.  Itch is painful.  He had not yet started the dexamethasone rinse.  He did see his dentist yesterday who thought he had thrush as a secondary issue and that this lesion might actually be autoimmune.  Patient has very comfortable with the fact that we have talked about doing a biopsy today in our previous communications and is hoping we can do that was here in clinic.    REVIEW OF SYSTEMS:  10 point ROS was negative other than the symptoms noted above in the HPI.    Physical Exam:  BP (!) 142/88   Pulse 83   Temp 97.7  F (36.5  C)   Resp 18   Ht 1.7 m (5' 6.93\")   Wt 74.4 kg (164 lb)   SpO2 100%   BMI 25.74 kg/m       Constitutional: The patient was unaccompanied, well-groomed, and in no acute distress.    Mouth: Mucosa pink and moist, tonsils non-erythematous, no exudates, uvula midline; 2 cm x 0.5 cm lesion that is de-papillated along the dorsum of the tongue at the median sulcus  Neck: No lymphadenopathy in the neck.  No palpable thyroid.  Normal range " of motion  Neurologic: Alert and oriented x 3.  CN's III-XII within normal limits.  Voice normal.   Psychiatric: The patient's affect was calm, cooperative, and appropriate.           PROCEDURE:  BIOPSY: After obtaining informed consent from Mr. Cardoza, we put Mr. Cardoza in a comfortable position. I injected the biopsy site with 1% lidocaine with 1:100,000 epinephrine. Once anesthesia was ascertained, I used a 4 mm punch biopsy to sample the abnormal tissue.  1 sample were taken and sent for permanent pathology.  Hemostasis was easily assured with pressure and 1, 4-0 chromic gut, simple interrupted suture. The patient tolerated the procedure well.        Assessment/Plan:   1. & 2.  Tongue pain and tongue lesion.    Patient with ongoing issues of what appears to be thrush on a tongue lesion.  There is no palpable mass.  Patient has not yet been able to start the dexamethasone solution rinses.    He has been through 3 antifungal agents and it continues to recur and not to get better.    At this point, we will go ahead and do a punch biopsy of the site.  Consent obtained after discussing the risks and benefits of the procedure.  Procedure as documented above.    Patient is aware that I will call him in 3-5 business days, after I get the biopsy results back.  In the meantime, he will do salt and soda gargles after each meal and drink to prevent infection.  I will also have him start the dexamethasone solution 5 mL by mouth swish and spit, 4 times daily for 14 days.    No further follow-up scheduled at this point because I need to see what the results show.  If it would be malignancy, I would have him meet with 1 of her head neck cancer patients.  If this is inflammatory or autoimmune, I would have him see 1 of the rheumatologist.  If this is viral, will treat with antivirals.  If it turns out to be ongoing fungal, I may need to extend the fluconazole prescription.        Lashae Thapa PA-C  Otolaryngology  Head &  Neck Surgery  [phone]      CC:  Marco Arita MD   [facility]

## 2019-10-23 NOTE — TELEPHONE ENCOUNTER
Resent the Dexamethasone 1 mg/mL soln, 5 mg QID x 14 days.  Cancelled previous Dexamethasone elixir prescription.  Lashae Thapa PA-C, 10/23/2019, 10:04 AM

## 2019-10-23 NOTE — NURSING NOTE
"Chief Complaint   Patient presents with     RECHECK     follow up     Blood pressure (!) 142/88, pulse 83, temperature 97.7  F (36.5  C), resp. rate 18, height 1.7 m (5' 6.93\"), weight 74.4 kg (164 lb), SpO2 100 %.    Herve Davidson LPN    "

## 2019-10-27 LAB — COPATH REPORT: NORMAL

## 2019-11-04 ENCOUNTER — MYC REFILL (OUTPATIENT)
Dept: OTOLARYNGOLOGY | Facility: CLINIC | Age: 34
End: 2019-11-04

## 2019-11-04 ENCOUNTER — MYC MEDICAL ADVICE (OUTPATIENT)
Dept: OTOLARYNGOLOGY | Facility: CLINIC | Age: 34
End: 2019-11-04

## 2019-11-04 DIAGNOSIS — K14.6 TONGUE PAIN: ICD-10-CM

## 2019-11-14 ENCOUNTER — MYC REFILL (OUTPATIENT)
Dept: INTERNAL MEDICINE | Facility: CLINIC | Age: 34
End: 2019-11-14

## 2019-11-14 DIAGNOSIS — B00.1 RECURRENT COLD SORES: ICD-10-CM

## 2019-11-15 RX ORDER — VALACYCLOVIR HYDROCHLORIDE 1 G/1
TABLET, FILM COATED ORAL
Qty: 16 TABLET | Refills: 0 | Status: SHIPPED | OUTPATIENT
Start: 2019-11-15 | End: 2020-07-16

## 2019-11-15 NOTE — TELEPHONE ENCOUNTER
valACYclovir (VALTREX) 1000 mg tablet      Last Written Prescription Date:  5/30/19  Last Fill Quantity: 16 tabs,   # refills: 1  Last Office Visit : 7/9/19  Future Office visit:  none    Refill to pharmacy.

## 2019-12-02 ENCOUNTER — OFFICE VISIT (OUTPATIENT)
Dept: ORTHOPEDICS | Facility: CLINIC | Age: 34
End: 2019-12-02
Payer: COMMERCIAL

## 2019-12-02 ENCOUNTER — ANCILLARY PROCEDURE (OUTPATIENT)
Dept: GENERAL RADIOLOGY | Facility: CLINIC | Age: 34
End: 2019-12-02
Attending: FAMILY MEDICINE
Payer: COMMERCIAL

## 2019-12-02 VITALS — BODY MASS INDEX: 24.77 KG/M2 | HEIGHT: 70 IN | WEIGHT: 173 LBS

## 2019-12-02 DIAGNOSIS — M79.671 RIGHT FOOT PAIN: Primary | ICD-10-CM

## 2019-12-02 DIAGNOSIS — M79.671 RIGHT FOOT PAIN: ICD-10-CM

## 2019-12-02 ASSESSMENT — MIFFLIN-ST. JEOR: SCORE: 1720.97

## 2019-12-02 NOTE — Clinical Note
12/2/2019       RE: Justo Cardoza  [address] [facility] [address]     Dear Colleague,    Thank you for referring your patient, Justo Cardoza, to the [facility] SPORTS AND ORTHOPAEDIC WALK IN CLINIC at [facility]. Please see a copy of my visit note below.          SPORTS & ORTHOPEDIC WALK-IN VISIT 12/2/2019    Primary Care Physician: Dr. Mcdowell    He was playing soccer on a trip and he got tackled. He hit his right great toe on the other persons foot. He has some pain when he puts weight towards his toes or pushes off while walking. He did have some briusing and swelling of the toe. The majority of pain is at the tip of the toe, slight discomfort at DIP.     Reason for visit:     What part of your body is injured / painful?  right foot    What caused the injury /pain? Recreational/competitive sports injury - Soccer    How long ago did your injury occur or pain begin? 11/24/19    What are your most bothersome symptoms? Pain and Swelling    How would you characterize your symptom?  dull and throbing    What makes your symptoms better? Ibuprofen and Rest    What makes your symptoms worse? Movement    Have you been previously seen for this problem? No    Medical History:    Any recent changes to your medical history? No    Any new medication prescribed since last visit? No    Have you had surgery on this body part before? No    Social History:    Occupation: Professor    Handedness: Right    Exercise: 2-3 days/week    Review of Systems:    Do you have fever, chills, weight loss? Yes, fever toward beginning of trip    Do you have any vision problems? No    Do you have any chest pain or edema? No    Do you have any shortness of breath or wheezing?  No    Do you have stomach problems? Yes, heartburn    Do you have any numbness or focal weakness? No    Do you have diabetes? No    Do you have problems with bleeding or clotting? No    Do you have an rashes or other skin  lesions? No           Again, thank you for allowing me to participate in the care of your patient.      Sincerely,    David Marina MD

## 2019-12-02 NOTE — PROGRESS NOTES
"      SPORTS & ORTHOPEDIC WALK-IN VISIT 12/2/2019    Primary Care Physician: Dr. Mcdowell    Roughly a week and half ago he was playing soccer and injured his right great toe.  Inadvertently struck another player's foot with his toe while kicking.  Had significant pain in the toe with large swelling and ecchymosis.  Over time this is subsided.  However, he does still have some pain with walking particularly when barefoot.  Pain comes in the toeing off stage.  No pain at rest.  Ibuprofen is helpful.  No tingling or numbness      Reason for visit:     What part of your body is injured / painful?  right foot    What caused the injury /pain? Recreational/competitive sports injury - Soccer    How long ago did your injury occur or pain begin? 11/24/19    What are your most bothersome symptoms? Pain and Swelling    How would you characterize your symptom?  dull and throbing    What makes your symptoms better? Ibuprofen and Rest    What makes your symptoms worse? Movement    Have you been previously seen for this problem? No    Medical History:    Any recent changes to your medical history? No    Any new medication prescribed since last visit? No    Have you had surgery on this body part before? No    Social History:    Occupation: Professor    Handedness: Right    Exercise: 2-3 days/week    Review of Systems:    Do you have fever, chills, weight loss? Yes, fever toward beginning of trip    Do you have any vision problems? No    Do you have any chest pain or edema? No    Do you have any shortness of breath or wheezing?  No    Do you have stomach problems? Yes, heartburn    Do you have any numbness or focal weakness? No    Do you have diabetes? No    Do you have problems with bleeding or clotting? No    Do you have an rashes or other skin lesions? No         Past Medical History, Current Medications, and Allergies are reviewed in the electronic medical record as appropriate.       EXAM:Ht 1.778 m (5' 10\")   Wt 78.5 kg " (173 lb)   BMI 24.82 kg/m      General  - alert, pleasant, no distress  CV  - normal radial pulse, cap refill brisk  Musculoskeletal - right great toe  - inspection: ecchymosis over the toe. no atrophy, normal joint alignment, no swelling  - palpation: TTP over dorsal IP joint, medial toe. Otherwise no bony or soft tissue tenderness, no tenderness throughout the midfoot  - ROM:  MTP 30 deg flexion   80 deg extension   IP 50 deg flexion   50 deg extension   - strength: 5/5 toe flexion, 5/5 toe extension, 5/5 dorsiflexion, 5/5 plantar flexion  - special tests:  Pain with axial loading  Neuro  - no numbness, no motor deficit, grossly normal coordination, normal muscle tone  Skin  - no  erythema, warmth, or induration, no obvious rash    Imaging: xrays of right great performed and reviewed independently demonstrating subtle fx over the lateral base of distal phalanx involving IP joint. See EMR for formal radiology report.     Assessment: Patient is a 34 year old male with nondisplaced distal phalanx fx of right great toe    Recommendations:   Reviewed imaging and assessment with patient  Discussed treatment with buddy taping or stiffer soled shoes for support  Okay to continue icing and use NSAIDs or acetaminophen as needed for pain  Follow-up CRYS Marina MD

## 2020-01-02 ENCOUNTER — MYC MEDICAL ADVICE (OUTPATIENT)
Dept: OTOLARYNGOLOGY | Facility: CLINIC | Age: 35
End: 2020-01-02

## 2020-02-17 ENCOUNTER — OFFICE VISIT (OUTPATIENT)
Dept: OTOLARYNGOLOGY | Facility: CLINIC | Age: 35
End: 2020-02-17
Payer: COMMERCIAL

## 2020-02-17 VITALS
SYSTOLIC BLOOD PRESSURE: 145 MMHG | BODY MASS INDEX: 24.91 KG/M2 | HEART RATE: 65 BPM | DIASTOLIC BLOOD PRESSURE: 68 MMHG | WEIGHT: 174 LBS | RESPIRATION RATE: 19 BRPM | HEIGHT: 70 IN

## 2020-02-17 DIAGNOSIS — K14.6 TONGUE PAIN: Primary | ICD-10-CM

## 2020-02-17 RX ORDER — DEXAMETHASONE 0.5 MG/5ML
0.5 ELIXIR ORAL 4 TIMES DAILY
Qty: 300 ML | Refills: 0 | Status: SHIPPED | OUTPATIENT
Start: 2020-02-17 | End: 2021-09-01

## 2020-02-17 ASSESSMENT — PAIN SCALES - GENERAL: PAINLEVEL: NO PAIN (0)

## 2020-02-17 ASSESSMENT — MIFFLIN-ST. JEOR: SCORE: 1726.76

## 2020-02-17 NOTE — NURSING NOTE
"  Blood pressure (!) 145/68, pulse 65, resp. rate 19, height 1.78 m (5' 10.08\"), weight 78.9 kg (174 lb).    Herve Davidson LPN    "

## 2020-02-17 NOTE — PROGRESS NOTES
"[facility] Follow Up Visit Note  Otolaryngology    February 17, 2020       HPI:  Justo Cardoza is a 34 year old male who presents for evaluation of soreness on the front half of his tongue.    Approximately a week ago, the patient noticed that he was having sensitivity on the front half of his tongue and that his tongue \"looked different\".  He states it was very red and the little taste buds were swollen and raised.  He was extremely sensitive to anything remotely spicy, even things he normally eats that are not things he would consider spicy.  He gave examples of Minty toothpaste, a banana, bread was too rough.  He states that it is gotten better with time.  He cannot remember if it started after eating anything particular but he may have ate some very spicy chips.  He denies any open sores.  No swelling of his lips.  No itching of the inside of his mouth.  He does not feel is overly dry.    He has not had anything to eat or drink that is new or different.  He has been avoiding the foods that seem to be irritating and he thinks that is calming it down.  He is gargling with some salt water.  He has not had any new medications.  He has not had any other symptoms of illness.    He denies fever, illness, headaches, vision changes, rhinorrhea or nasal congestion, ear pain or drainage, actual sores in his mouth, cold sores outside of the mouth, sore throat, chest pain, shortness of breath, nausea, vomiting, diarrhea, abdominal bloating or pain, UTI symptoms.  No dry eyes or dry nose.  No eye tearing. No trouble swallowing.    REVIEW OF SYSTEMS:  10 point ROS was negative other than the symptoms noted above in the HPI.    Physical Exam:  BP (!) 145/68   Pulse 65   Resp 19   Ht 1.78 m (5' 10.08\")   Wt 78.9 kg (174 lb)   BMI 24.91 kg/m      Constitutional: The patient was unaccompanied, well-groomed, and in no acute distress.    Head: Normocephalic and atraumatic.   Eyes: Pupils were equal and " reactive.  Extraocular movement intact.    Ears: Pinnae and tragus non-tender.  EACs and TMs were clear under microscopic exam.   Nose: Sinuses were non-tender.  Anterior rhinoscopy revealed midline septum and absence of purulence or polyps.    Mouth: Mucosa pink and moist, tonsils non-erythematous, no exudates, uvula midline, tongue with previous konwn de-papillate lesoin along the dorsum of the tongue at the median sulcus (unchanged), the anterior 1/3 of the tongue is slightly erythematous with a few enlarged papillae, no underlying masses, non-tender to palpation   Neck: No lymphadenopathy in the neck.  No palpable thyroid.  Normal range of motion  Respiratory: Breathing comfortably without stridor or exertion of accessory muscles.   Skin: Normal:  warm and pink without rash  Neurologic: Alert and oriented x 3.  CN's III-XII within normal limits.  Voice normal.   Psychiatric: The patient's affect was calm, cooperative, and appropriate.    Communication: Normal; communicates verbally, normal voice quality.        Assessment/Plan:   1. Tongue pain.  Patient with recurrent episode of tongue pain.  Mouth itself does not look overtly dry.  There are no aphthous ulcers or lesions that I feel are amendable to biopsy.  There is no underlying mass.    Differential diagnosis includes allergic stomatitis, possibly secondary to food, drink or medication (although he has not had any changes to his medications).  I did suggest he journal his food intake to see if we can find a trend.    Sjogren's syndrome for other rheumatological disorders come to mind as well.  Viral syndrome.  Also consider burning mouth syndrome.    Discussed with patient that we could handle things multiple different ways.  At this point, I do not think anything is amenable to biopsy.  We can treat him symptomatically with dexamethasone rinse.  He could also avoid doing that and start Valacyclovir to see if that helps his symptoms and that this is  possibly viral.    I did offer referral to allergy for food allergy testing and rheumatology for further testing.  I also offered to have my colleague, Dr. Salazar, for a second opinion.    He is going to try the valacyclovir.  He would like to hold off on the dexamethasone rinse, but have me send the Rx so he has it if needed.  He will journal his foods and see if he develops a trend.  He wants to hold off on any further referrals at this time.  We will follow-up on an as-needed basis.        Lashae Thapa PA-C  Otolaryngology  Head & Neck Surgery  [phone]    Addendum   Reviewed case with Dr. Salazar.  He felt that our plan was sound.  The other thing he had concerns about is that if the patient was overbrushing is tongue and causing hyperkeratosis.  He asked that I have the patient stop brushing his tongue and follow up as planned.      Discussed with patient over telephone. He verbalized his understanding.    Lashae Thapa PA-C, 2/18/2020, 4:18 PM        CC:  Marco Arita MD   [facility] [place]

## 2020-02-17 NOTE — PATIENT INSTRUCTIONS
Justo Cadroza,    It was a pleasure to see you today.    1. You were seen in the ENT Clinic today by Lashae Thapa PA-C.  If you have any questions or concerns after your appointment, please call   - Option 1: ENT Clinic: [phone]      2. Return as needed.    3. Use the Dexmethasone rinse 4 times a day for 10 days.    4. Things I would consider as possible causes:    -Food allergy (journal your food intake to see if we can find a common food causing symptoms)    -Allergic stomatitis (allergen can be food, drink, medication)    -Burning Mouth syndrome    -Sjogren's syndrome    -Other rheumatologic disorders    -viral infections (although we didn't see this on pathology of the biopsy)    -less likely malignancy (based on the biopsy there is none)      5. Next steps:   - consider food allergy testing     - evaluation by Rheumatology     - seeing one of my colleagues, Dr. Salazar.    Thank you,  Lashae Thapa PA-C  Otolaryngology  Head & Neck Surgery  [phone]

## 2020-02-17 NOTE — LETTER
"2/17/2020       RE: Justo Cardoza  [address] [address]     Dear Colleague,    Thank you for referring your patient, Justo Cardoza, to the [facility]. Please see a copy of my visit note below.    [facility] Follow Up Visit Note  Otolaryngology    February 17, 2020       HPI:  Justo Cardoza is a 34 year old male who presents for evaluation of soreness on the front half of his tongue.    Approximately a week ago, the patient noticed that he was having sensitivity on the front half of his tongue and that his tongue \"looked different\".  He states it was very red and the little taste buds were swollen and raised.  He was extremely sensitive to anything remotely spicy, even things he normally eats that are not things he would consider spicy.  He gave examples of Minty toothpaste, a banana, bread was too rough.  He states that it is gotten better with time.  He cannot remember if it started after eating anything particular but he may have ate some very spicy chips.  He denies any open sores.  No swelling of his lips.  No itching of the inside of his mouth.  He does not feel is overly dry.    He has not had anything to eat or drink that is new or different.  He has been avoiding the foods that seem to be irritating and he thinks that is calming it down.  He is gargling with some salt water.  He has not had any new medications.  He has not had any other symptoms of illness.    He denies fever, illness, headaches, vision changes, rhinorrhea or nasal congestion, ear pain or drainage, actual sores in his mouth, cold sores outside of the mouth, sore throat, chest pain, shortness of breath, nausea, vomiting, diarrhea, abdominal bloating or pain, UTI symptoms.  No dry eyes or dry nose.  No eye tearing. No trouble swallowing.    REVIEW OF SYSTEMS:  10 point ROS was negative other than the symptoms noted above in the " "HPI.    Physical Exam:  BP (!) 145/68   Pulse 65   Resp 19   Ht 1.78 m (5' 10.08\")   Wt 78.9 kg (174 lb)   BMI 24.91 kg/m       Constitutional: The patient was unaccompanied, well-groomed, and in no acute distress.    Head: Normocephalic and atraumatic.   Eyes: Pupils were equal and reactive.  Extraocular movement intact.    Ears: Pinnae and tragus non-tender.  EACs and TMs were clear under microscopic exam.   Nose: Sinuses were non-tender.  Anterior rhinoscopy revealed midline septum and absence of purulence or polyps.    Mouth: Mucosa pink and moist, tonsils non-erythematous, no exudates, uvula midline, tongue with previous konwn de-papillate lesoin along the dorsum of the tongue at the median sulcus (unchanged), the anterior 1/3 of the tongue is slightly erythematous with a few enlarged papillae, no underlying masses, non-tender to palpation   Neck: No lymphadenopathy in the neck.  No palpable thyroid.  Normal range of motion  Respiratory: Breathing comfortably without stridor or exertion of accessory muscles.   Skin: Normal:  warm and pink without rash  Neurologic: Alert and oriented x 3.  CN's III-XII within normal limits.  Voice normal.   Psychiatric: The patient's affect was calm, cooperative, and appropriate.    Communication: Normal; communicates verbally, normal voice quality.        Assessment/Plan:   1. Tongue pain.  Patient with recurrent episode of tongue pain.  Mouth itself does not look overtly dry.  There are no aphthous ulcers or lesions that I feel are amendable to biopsy.  There is no underlying mass.    Differential diagnosis includes allergic stomatitis, possibly secondary to food, drink or medication (although he has not had any changes to his medications).  I did suggest he journal his food intake to see if we can find a trend.    Sjogren's syndrome for other rheumatological disorders come to mind as well.  Viral syndrome.  Also consider burning mouth syndrome.    Discussed with patient " that we could handle things multiple different ways.  At this point, I do not think anything is amenable to biopsy.  We can treat him symptomatically with dexamethasone rinse.  He could also avoid doing that and start Valacyclovir to see if that helps his symptoms and that this is possibly viral.    I did offer referral to allergy for food allergy testing and rheumatology for further testing.  I also offered to have my colleague, Dr. Salazar, for a second opinion.    He is going to try the valacyclovir.  He would like to hold off on the dexamethasone rinse, but have me send the Rx so he has it if needed.  He will journal his foods and see if he develops a trend.  He wants to hold off on any further referrals at this time.  We will follow-up on an as-needed basis.        Lashae Thapa PA-C  Otolaryngology  Head & Neck Surgery  [phone]      CC:  Marco Arita MD   [facility]

## 2020-03-10 ENCOUNTER — HEALTH MAINTENANCE LETTER (OUTPATIENT)
Age: 35
End: 2020-03-10

## 2020-07-14 DIAGNOSIS — B00.1 RECURRENT COLD SORES: ICD-10-CM

## 2020-07-16 RX ORDER — VALACYCLOVIR HYDROCHLORIDE 1 G/1
TABLET, FILM COATED ORAL
Qty: 16 TABLET | Refills: 0 | Status: SHIPPED | OUTPATIENT
Start: 2020-07-16 | End: 2020-12-13

## 2020-07-16 NOTE — TELEPHONE ENCOUNTER
VALACYCLOVIR HCL 1GM TABS     Last Written Prescription Date:  11/15/2019  Last Fill Quantity: 16,   # refills: 0  Last Office Visit : 7/9/2019  Future Office visit:  None    Routing refill request to provider for review/approval because:  Office Visit Due        90 day josie sent to pharm   Clinic Coordinator  Notified    Marce Diaz RN  Central Triage Red Flags/Med Refills

## 2020-07-17 NOTE — TELEPHONE ENCOUNTER
Called and left message to schedule re-establish care and for ongoing follow ups as PCP Dr. Mcdowell graduate

## 2020-08-15 NOTE — PROGRESS NOTES
PRIMARY CARE CENTER       SUBJECTIVE:  Justo Cardoza is a 32 year old male with a PMHx of BPD who comes in for referral to psychiatry.  He has been followed at an outside facility for a number of years, however his insurance has changed this year and he is fairly certain that his current provider is no longer covered, and he is hoping for referral to a different provider within the [facility] system.    Other things that he is concerned about-he is worried about his heart rate and his blood pressure.  He said his blood pressure which he has checked a couple of times in the past has ranged from 120-130 systolic (here he has been in the 110-115 range generally, with one value of 130) and his heart rate which is usually in the 50s he says is now in the 60s.  He is wondering if there is anything causing this or if he should be worried.  He denies any changes in bowel movements such as constipation or diarrhea, he denies changes in his skin, and he denies weight gain or loss.    Medications and allergies reviewed by me today.     ROS:   7 pt ROS negative except as noted in the HPI    OBJECTIVE:    /71 (BP Location: Right arm, Patient Position: Sitting, Cuff Size: Adult Regular)  Pulse 82  Resp 20  Wt 77.3 kg (170 lb 8 oz)  SpO2 99%  BMI 24.46 kg/m2   Wt Readings from Last 1 Encounters:   02/13/18 77.3 kg (170 lb 8 oz)     Gen: alert, oriented, well-groomed, in no distress.   Resp: on room air, no dyspnea.  CV: regular rate and rhythm, no murmurs. Warm extremities.   Psych: appropriate, making eye contact, logical thought process, somewhat anxious but easily reassured.      ASSESSMENT/PLAN:    uJsto was seen today for referral and hypertension.    Diagnoses and all orders for this visit:    Bipolar disorder, in full remission, most recent episode manic (H)-the psychiatry referrals that I can do in the system are for 1 time consultations, not for longitudinal care (unless of patient is  extremely complicated and requires that, which he does not fit under this category.)  I explained to him that he should call his insurance, find out which providers are in network, and I will refer him to a specific clinic and/or provider.  -Patient will contact me about providers within his network and I will give him a referral at that time.    Blood pressure and heart rate are completely normal.  I relayed to the patient his thyroid is normal, and I confirm that he has no other changes since the last time his thyroid was checked.  Heart rates of 60 and blood pressure at 130 is completely normal.  He was reassured.           Pt should return to clinic for f/u with me as needed    Ashlyn Mcdowell MD  Feb 13, 2018    Plan of care discussed with Dr. Pimentel.     While the patient was in clinic, I reviewed the pertinent medical history and results.  I discussed the current findings on physical examination, as well as the patient s diagnosis and treatment plan with the resident and agree with the information as documented with the following exceptions: none.  Emilee Pimentel MD  Internal Medicine     No

## 2020-11-13 ENCOUNTER — NURSE TRIAGE (OUTPATIENT)
Dept: NURSING | Facility: CLINIC | Age: 35
End: 2020-11-13

## 2020-11-13 NOTE — TELEPHONE ENCOUNTER
"Patient is calling requesting COVID serologic antibody testing.  NOTE: Serologic testing is a blood test for 'antibodies' which are made at 10-14 days after you have had symptoms of COVID or were exposed and had an asymptomatic infection.  This does NOT test you for 'active' infection or tell you if you are contagious.    Are you a healthcare worker? no  Do you currently have a cough, fever, body aches, shortness of breath, or difficulty breathing?  No  Did you previously have cough, fever, body aches, shortness of breath, or difficulty breathing that have now resolved? Has had previous covid symptoms.   Symptoms began on 11/1  Symptoms started < 14 days ago.  Recommend they wait for testing until it has been 14 days as it can take 2 weeks after symptoms or exposure for the test to be positive. Call back after 14 days     The patient was informed: \"Testing is limited each day and it may take time for testing to be available to everyone who has called. You will receive a call within 48-72 hours to schedule the serology testing. Please confirm the best number to reach you is [phone]. If you have any questions about scheduling, call [phone].\"     Order will not be placed at this time. Justo's son recently had covid 19. Per his son's provider, Justo's son was probably contagious up until 11/8-11/11. Justo then realized that he should wait until at least 11/25 to have the serology test.  Justo will call back.    Collette GALLOWAY RN [facility]         "

## 2020-12-24 ENCOUNTER — MYC MEDICAL ADVICE (OUTPATIENT)
Dept: INTERNAL MEDICINE | Facility: CLINIC | Age: 35
End: 2020-12-24

## 2021-01-25 ENCOUNTER — VIRTUAL VISIT (OUTPATIENT)
Dept: INTERNAL MEDICINE | Facility: CLINIC | Age: 36
End: 2021-01-25
Payer: COMMERCIAL

## 2021-01-25 DIAGNOSIS — R21 RASH AND NONSPECIFIC SKIN ERUPTION: Primary | ICD-10-CM

## 2021-01-25 DIAGNOSIS — B00.1 RECURRENT COLD SORES: ICD-10-CM

## 2021-01-25 PROCEDURE — 99214 OFFICE O/P EST MOD 30 MIN: CPT | Mod: 95 | Performed by: INTERNAL MEDICINE

## 2021-01-25 RX ORDER — VALACYCLOVIR HYDROCHLORIDE 1 G/1
TABLET, FILM COATED ORAL
Qty: 12 TABLET | Refills: 1 | Status: SHIPPED | OUTPATIENT
Start: 2021-01-25 | End: 2021-10-28

## 2021-01-25 RX ORDER — TRIAMCINOLONE ACETONIDE 1 MG/G
OINTMENT TOPICAL 2 TIMES DAILY
Qty: 15 G | Refills: 1 | Status: SHIPPED | OUTPATIENT
Start: 2021-01-25 | End: 2021-02-01

## 2021-01-25 SDOH — HEALTH STABILITY: MENTAL HEALTH: HOW MANY STANDARD DRINKS CONTAINING ALCOHOL DO YOU HAVE ON A TYPICAL DAY?: NOT ASKED

## 2021-01-25 SDOH — HEALTH STABILITY: MENTAL HEALTH: HOW OFTEN DO YOU HAVE A DRINK CONTAINING ALCOHOL?: NOT ASKED

## 2021-01-25 SDOH — HEALTH STABILITY: MENTAL HEALTH: HOW OFTEN DO YOU HAVE 6 OR MORE DRINKS ON ONE OCCASION?: NOT ASKED

## 2021-01-25 NOTE — NURSING NOTE
Chief Complaint   Patient presents with     Recheck Medication     med refill     Kimberly Nissen, EMT at 1:50 PM on 1/25/2021    Video Visit Technology for this patient: Boo Video Visit- Patient was left in waiting room

## 2021-01-25 NOTE — PROGRESS NOTES
Justo Cardoza is a 35 year old male who is being evaluated via a billable video visit.      The patient has consented to a video visit and informed that video and telephone visits are being performed during the COVID-19 pandemic in order to mitigate the risk of an in office visit for appropriate candidates/issues. If during the course of the call the physician/provider feels a video visit is not appropriate, you will not be charged for this service. If the provider feels that they are unable to assess your concerns without an in person visit, you will be advised of this limitation and depending on the nature of the concern, advised to seek in person care if your provider feels you need urgent evaluation.      Subjective     Justo Cardoza is a 35 year old male who presents to clinic today for the following health issues:    Chief Complaint   Patient presents with     Recheck Medication     med refill       HPI    He previously followed with Dr. ESPINAL in resident clinic. PMH notable for bipolar do, anxiety, HSV.    He gets oral cold sores, stress related, every couple months, valtrex helps.    Patient reports tick on his leg 4 weeks ago while in [place], crawling on leg, not attached, not puffy/engorged. No rashes, no F/N, no joint pain.    Scaly dry patch on arm near mole. No other spots.    [facility].    Elevated blood pressure at home? 127/76  99/68  Weight stable. Exercising.                      Patient Active Problem List   Diagnosis     Right ankle pain     Past Surgical History:   Procedure Laterality Date     APPENDECTOMY  2000     GENITOURINARY SURGERY  2009    Hydrocelectomy     HERNIA REPAIR, INGUINAL RT/LT  1995       Social History     Tobacco Use     Smoking status: Never Smoker     Smokeless tobacco: Never Used   Substance Use Topics     Alcohol use: Not on file     Family History   Problem Relation Age of Onset     Skin Cancer Father      Depression Father      Skin  "Cancer Paternal Grandfather      Hypertension Mother      Depression Brother          Current Outpatient Medications   Medication Sig Dispense Refill     QUEtiapine (SEROQUEL) 50 MG tablet Take 25 mg by mouth daily   0     valACYclovir (VALTREX) 1000 mg tablet Take 2 Tablets by mouth when symptoms of cold sores start, and 2 tabs 12  Hours later, for total of 1 day treatment. For additional refills, please schedule annual appointment at [phone] Overdue for labs 4 tablet 0     dexamethasone (DECADRON) 0.5 MG/5ML elixir Take 5 mLs (0.5 mg) by mouth 4 times daily for 15 days 300 mL 0     No Known Allergies      Review of Systems   A detailed ROS was performed and was negative unless indicated in the HPI above.        Physical Exam   There were no vitals taken for this visit.  Wt Readings from Last 2 Encounters:   02/17/20 78.9 kg (174 lb)   12/02/19 78.5 kg (173 lb)      Ht Readings from Last 2 Encounters:   02/17/20 1.78 m (5' 10.08\")   12/02/19 1.778 m (5' 10\")     GENERAL: healthy, alert and no distress  HEAD: Normocephalic, atraumatic  EYES: Eyes grossly normal to inspection, EOMI and conjunctivae and sclerae normal  RESP: Speaking in full sentences, unlabored, no audible wheezes or cough  SKIN: no suspicious lesions or rashes, no jaundice, small ~1-2 cm patch of mildly erythematous skin on L arm, difficult to visualize over camera  NEURO: oriented, and speech normal  PSYCH: mentation appears normal, affect normal/bright          Diagnostic Test Results:  Labs reviewed in Epic            Assessment and Plan:  Justo was seen today for recheck medication.    Diagnoses and all orders for this visit:    Rash and nonspecific skin eruption  Difficult to visualize on video, perhaps eczematous versus inflamed SK. Advised to return to clinic if not improved.  -     triamcinolone (KENALOG) 0.1 % external ointment; Apply topically 2 times daily for 7 days    Recurrent cold sores  -     valACYclovir (VALTREX) 1000 mg " tablet; Take 2 Tablets by mouth when symptoms of cold sores start, and 2 tabs 12  Hours later, for total of 1 day treatment.    Tick on leg, not embedded  Low risk, out of window for prophylaxis, low concern for lyme.  Advised monitoring for symptoms.      Video-Visit Details    Type of service:  Video Visit    Video Start/End Time: 2:09 PM 2:33 PM    Originating Location (pt. Location): Home    Distant Location (provider location):  [facility]     Mode of Communication:  Video Conference via  [employer] or  [employer]        Emilee Pimentel MD  Internal Medicine      30 minutes spent today performing chart review, history and exam, documentation and further activities as noted above.

## 2021-02-09 ENCOUNTER — MYC MEDICAL ADVICE (OUTPATIENT)
Dept: INTERNAL MEDICINE | Facility: CLINIC | Age: 36
End: 2021-02-09

## 2021-02-09 DIAGNOSIS — Z01.00 VISIT FOR EYE AND VISION EXAM: Primary | ICD-10-CM

## 2021-02-24 ENCOUNTER — OFFICE VISIT (OUTPATIENT)
Dept: OPHTHALMOLOGY | Facility: CLINIC | Age: 36
End: 2021-02-24
Payer: COMMERCIAL

## 2021-02-24 DIAGNOSIS — Z01.00 VISIT FOR EYE AND VISION EXAM: ICD-10-CM

## 2021-02-24 DIAGNOSIS — H04.123 DRY EYE SYNDROME OF BOTH EYES: ICD-10-CM

## 2021-02-24 DIAGNOSIS — H52.13 MYOPIA OF BOTH EYES: Primary | ICD-10-CM

## 2021-02-24 PROCEDURE — 92015 DETERMINE REFRACTIVE STATE: CPT | Performed by: OPTOMETRIST

## 2021-02-24 PROCEDURE — 92004 COMPRE OPH EXAM NEW PT 1/>: CPT | Performed by: OPTOMETRIST

## 2021-02-24 RX ORDER — FAMOTIDINE 10 MG
10 TABLET ORAL 2 TIMES DAILY
COMMUNITY

## 2021-02-24 ASSESSMENT — REFRACTION_MANIFEST
OS_SPHERE: -4.75
OD_CYLINDER: +0.75
OD_SPHERE: -4.50
OS_AXIS: 135
OS_CYLINDER: +1.00
OD_AXIS: 060

## 2021-02-24 ASSESSMENT — REFRACTION_WEARINGRX
OS_CYLINDER: +0.75
OD_AXIS: 058
OS_SPHERE: -4.75
OD_CYLINDER: +0.75
OS_AXIS: 130
OD_SPHERE: -4.50

## 2021-02-24 ASSESSMENT — EXTERNAL EXAM - RIGHT EYE: OD_EXAM: NORMAL

## 2021-02-24 ASSESSMENT — CUP TO DISC RATIO
OS_RATIO: 0.5
OD_RATIO: 0.5

## 2021-02-24 ASSESSMENT — VISUAL ACUITY
CORRECTION_TYPE: CONTACTS
OS_CC: 20/25
OD_CC+: -2
METHOD: SNELLEN - LINEAR
OS_CC+: -2
OD_CC: 20/25

## 2021-02-24 ASSESSMENT — SLIT LAMP EXAM - LIDS
COMMENTS: NORMAL
COMMENTS: NORMAL

## 2021-02-24 ASSESSMENT — EXTERNAL EXAM - LEFT EYE: OS_EXAM: NORMAL

## 2021-02-24 ASSESSMENT — CONF VISUAL FIELD
OS_NORMAL: 1
METHOD: COUNTING FINGERS
OD_NORMAL: 1

## 2021-02-24 ASSESSMENT — TONOMETRY
OD_IOP_MMHG: 17
IOP_METHOD: ICARE
OS_IOP_MMHG: 20

## 2021-02-24 NOTE — NURSING NOTE
"Chief Complaints and History of Present Illnesses   Patient presents with     COMPREHENSIVE EYE EXAM     Chief Complaint(s) and History of Present Illness(es)     COMPREHENSIVE EYE EXAM     Laterality: both eyes    Associated symptoms: dryness and floaters (longstanding, no changes per pt).  Negative for eye pain, tearing and flashes    Treatments tried: artificial tears    Response to treatment: mild improvement    Pain scale: 0/10              Comments     Pt notes that he hasn't had an eye exam in the last 5 years, \"main outcome I want is a new gls rx\", has been considering getting LASIK, wondering if he would be a good candidate, \"my eyes get super dry\", uses Systane Ultra uses at least BID sometimes more prn. Pt notes that VA is fine, gls are just scratched.     Juju Cole COT February 24, 2021 1:15 PM                  "

## 2021-02-24 NOTE — Clinical Note
Thank you for referring Justo Cardoza for his annual eye exam.  Myopia and dry eye syndrome noted on examination.  Recommended repeat evaluation in 1 year.  Please contact me with any questions.  Haris Tyson, OD on 2/26/2021 at 7:56 AM

## 2021-02-24 NOTE — PROGRESS NOTES
"History  HPI     COMPREHENSIVE EYE EXAM     In both eyes.  Associated symptoms include dryness and floaters (longstanding, no changes per pt).  Negative for eye pain, tearing and flashes.  Treatments tried include artificial tears.  Response to treatment was mild improvement.  Pain was noted as 0/10.              Comments     Pt notes that he hasn't had an eye exam in the last 5 years, \"main outcome I want is a new gls rx\", has been considering getting LASIK, wondering if he would be a good candidate, \"my eyes get super dry\", uses Systane Ultra uses at least BID sometimes more prn. Pt notes that VA is fine, gls are just scratched.     Juju Cole COT February 24, 2021 1:15 PM            Last edited by Claudia Cole on 2/24/2021  1:15 PM. (History)          Assessment/Plan  (H52.13) Myopia of both eyes  (primary encounter diagnosis)  Comment: Myopia both eyes   Plan: REFRACTION         Educated patient on condition and clinical findings. Dispensed spectacle prescription for full time wear. Monitor annually.   Patient to schedule LASIK consultation as needed.    (H04.123) Dry eye syndrome of both eyes  Comment: Improved comfort with artificial tears  Plan:  Recommend continued use of artificial tears 2-4 times each day. Recommended warm compresses as needed. Monitor annually.    (Z01.00) Visit for eye and vision exam  Comment: Referred for eye exam  Plan:  Copy of chart sent to Dr. Pimentel.    Return to clinic in 1 year for comprehensive eye exam.    Complete documentation of historical and exam elements from today's encounter can  be found in the full encounter summary report (not reduplicated in this progress  note). I personally obtained the chief complaint(s) and history of present illness. I  confirmed and edited as necessary the review of systems, past medical/surgical  history, family history, social history, and examination findings as documented by  others; and I examined the patient myself. I personally " reviewed the relevant tests,  images, and reports as documented above. I formulated and edited as necessary the  assessment and plan and discussed the findings and management plan with the  patient and family.    Haris Tyson OD, FAAO

## 2021-04-24 ENCOUNTER — HEALTH MAINTENANCE LETTER (OUTPATIENT)
Age: 36
End: 2021-04-24

## 2021-09-01 ENCOUNTER — LAB (OUTPATIENT)
Dept: LAB | Facility: CLINIC | Age: 36
End: 2021-09-01
Payer: COMMERCIAL

## 2021-09-01 ENCOUNTER — OFFICE VISIT (OUTPATIENT)
Dept: INTERNAL MEDICINE | Facility: CLINIC | Age: 36
End: 2021-09-01
Payer: COMMERCIAL

## 2021-09-01 ENCOUNTER — ANCILLARY PROCEDURE (OUTPATIENT)
Dept: GENERAL RADIOLOGY | Facility: CLINIC | Age: 36
End: 2021-09-01
Attending: INTERNAL MEDICINE
Payer: COMMERCIAL

## 2021-09-01 VITALS
DIASTOLIC BLOOD PRESSURE: 73 MMHG | HEIGHT: 70 IN | HEART RATE: 55 BPM | BODY MASS INDEX: 24.77 KG/M2 | OXYGEN SATURATION: 100 % | SYSTOLIC BLOOD PRESSURE: 117 MMHG | WEIGHT: 173 LBS

## 2021-09-01 DIAGNOSIS — R07.9 CHEST PAIN, UNSPECIFIED TYPE: Primary | ICD-10-CM

## 2021-09-01 DIAGNOSIS — R07.9 CHEST PAIN, UNSPECIFIED TYPE: ICD-10-CM

## 2021-09-01 LAB
ALBUMIN SERPL-MCNC: 4.7 G/DL (ref 3.4–5)
ALP SERPL-CCNC: 78 U/L (ref 40–150)
ALT SERPL W P-5'-P-CCNC: 36 U/L (ref 0–70)
ANION GAP SERPL CALCULATED.3IONS-SCNC: 6 MMOL/L (ref 3–14)
AST SERPL W P-5'-P-CCNC: 24 U/L (ref 0–45)
ATRIAL RATE - MUSE: 53 BPM
BASOPHILS # BLD AUTO: 0 10E3/UL (ref 0–0.2)
BASOPHILS NFR BLD AUTO: 1 %
BILIRUB SERPL-MCNC: 0.7 MG/DL (ref 0.2–1.3)
BUN SERPL-MCNC: 23 MG/DL (ref 7–30)
CALCIUM SERPL-MCNC: 9.1 MG/DL (ref 8.5–10.1)
CHLORIDE BLD-SCNC: 105 MMOL/L (ref 94–109)
CHOLEST SERPL-MCNC: 209 MG/DL
CK SERPL-CCNC: 160 U/L (ref 30–300)
CO2 SERPL-SCNC: 29 MMOL/L (ref 20–32)
CREAT SERPL-MCNC: 1.01 MG/DL (ref 0.66–1.25)
CRP SERPL-MCNC: <2.9 MG/L (ref 0–8)
DIASTOLIC BLOOD PRESSURE - MUSE: NORMAL MMHG
EOSINOPHIL # BLD AUTO: 0.1 10E3/UL (ref 0–0.7)
EOSINOPHIL NFR BLD AUTO: 1 %
ERYTHROCYTE [DISTWIDTH] IN BLOOD BY AUTOMATED COUNT: 12.2 % (ref 10–15)
ERYTHROCYTE [SEDIMENTATION RATE] IN BLOOD BY WESTERGREN METHOD: 6 MM/HR (ref 0–15)
FASTING STATUS PATIENT QL REPORTED: YES
GFR SERPL CREATININE-BSD FRML MDRD: >90 ML/MIN/1.73M2
GLUCOSE BLD-MCNC: 97 MG/DL (ref 70–99)
HCT VFR BLD AUTO: 42.6 % (ref 40–53)
HDLC SERPL-MCNC: 88 MG/DL
HGB BLD-MCNC: 14.8 G/DL (ref 13.3–17.7)
IMM GRANULOCYTES # BLD: 0 10E3/UL
IMM GRANULOCYTES NFR BLD: 0 %
INTERPRETATION ECG - MUSE: NORMAL
LDLC SERPL CALC-MCNC: 111 MG/DL
LYMPHOCYTES # BLD AUTO: 1.7 10E3/UL (ref 0.8–5.3)
LYMPHOCYTES NFR BLD AUTO: 32 %
MCH RBC QN AUTO: 31.1 PG (ref 26.5–33)
MCHC RBC AUTO-ENTMCNC: 34.7 G/DL (ref 31.5–36.5)
MCV RBC AUTO: 90 FL (ref 78–100)
MONOCYTES # BLD AUTO: 0.6 10E3/UL (ref 0–1.3)
MONOCYTES NFR BLD AUTO: 11 %
NEUTROPHILS # BLD AUTO: 2.9 10E3/UL (ref 1.6–8.3)
NEUTROPHILS NFR BLD AUTO: 55 %
NONHDLC SERPL-MCNC: 121 MG/DL
NRBC # BLD AUTO: 0 10E3/UL
NRBC BLD AUTO-RTO: 0 /100
P AXIS - MUSE: 64 DEGREES
PLATELET # BLD AUTO: 168 10E3/UL (ref 150–450)
POTASSIUM BLD-SCNC: 4.1 MMOL/L (ref 3.4–5.3)
PR INTERVAL - MUSE: 160 MS
PROT SERPL-MCNC: 8.3 G/DL (ref 6.8–8.8)
QRS DURATION - MUSE: 92 MS
QT - MUSE: 412 MS
QTC - MUSE: 386 MS
R AXIS - MUSE: 48 DEGREES
RBC # BLD AUTO: 4.76 10E6/UL (ref 4.4–5.9)
SODIUM SERPL-SCNC: 140 MMOL/L (ref 133–144)
SYSTOLIC BLOOD PRESSURE - MUSE: NORMAL MMHG
T AXIS - MUSE: 48 DEGREES
TRIGL SERPL-MCNC: 50 MG/DL
TSH SERPL DL<=0.005 MIU/L-ACNC: 1.33 MU/L (ref 0.4–4)
VENTRICULAR RATE- MUSE: 53 BPM
WBC # BLD AUTO: 5.3 10E3/UL (ref 4–11)

## 2021-09-01 PROCEDURE — 86140 C-REACTIVE PROTEIN: CPT | Performed by: PATHOLOGY

## 2021-09-01 PROCEDURE — 36415 COLL VENOUS BLD VENIPUNCTURE: CPT | Performed by: PATHOLOGY

## 2021-09-01 PROCEDURE — 93000 ELECTROCARDIOGRAM COMPLETE: CPT | Performed by: INTERNAL MEDICINE

## 2021-09-01 PROCEDURE — 80050 GENERAL HEALTH PANEL: CPT | Performed by: PATHOLOGY

## 2021-09-01 PROCEDURE — 99214 OFFICE O/P EST MOD 30 MIN: CPT | Mod: 25 | Performed by: INTERNAL MEDICINE

## 2021-09-01 PROCEDURE — 85652 RBC SED RATE AUTOMATED: CPT | Performed by: PATHOLOGY

## 2021-09-01 PROCEDURE — 82550 ASSAY OF CK (CPK): CPT | Performed by: PATHOLOGY

## 2021-09-01 PROCEDURE — 80061 LIPID PANEL: CPT | Performed by: PATHOLOGY

## 2021-09-01 PROCEDURE — 71046 X-RAY EXAM CHEST 2 VIEWS: CPT | Performed by: RADIOLOGY

## 2021-09-01 RX ORDER — NAPROXEN 500 MG/1
500 TABLET ORAL 2 TIMES DAILY WITH MEALS
Qty: 60 TABLET | Refills: 1 | Status: SHIPPED | OUTPATIENT
Start: 2021-09-01 | End: 2022-01-26

## 2021-09-01 ASSESSMENT — MIFFLIN-ST. JEOR: SCORE: 1705.97

## 2021-09-01 ASSESSMENT — PAIN SCALES - GENERAL: PAINLEVEL: NO PAIN (0)

## 2021-09-01 NOTE — PROGRESS NOTES
HPI  36-year-old  presents today for evaluation of chest pain.  He reports insidious onset a month ago of precordial pain.  This is intermittently present in the sternum and precordium multiple times a day.  It is a sharp stabbing pain that migrates along the sternal margin and sides.  He is not able to identify any precipitating factors they resolve spontaneously after just a couple of minutes.  He is running regularly and is not noticing any pain or aggravation related to exercise or activity.  He has no pain with exercise or exertion.  Does not wake him up at night.  There is no associated symptoms such as fever chills sweats dizziness lightheadedness palpitations.  He has had a history of tachycardia in the past and palpitations however a Holter monitor study was negative.  Over the past month there has been no increase in the frequency and severity of the pain or discomfort.  It is associated with some occasional tenderness in the sternum and the anterior chest wall.  Past Medical History:   Diagnosis Date     Bipolar affective disorder in remission (H)      Recurrent cold sores      Past Surgical History:   Procedure Laterality Date     APPENDECTOMY  2000     GENITOURINARY SURGERY  2009    Hydrocelectomy     HERNIA REPAIR, INGUINAL RT/LT  1995     Family History   Problem Relation Age of Onset     Skin Cancer Father      Depression Father      Skin Cancer Paternal Grandfather      Hypertension Mother      Depression Brother      Glaucoma No family hx of      Macular Degeneration No family hx of      Social History     Socioeconomic History     Marital status:      Spouse name: None     Number of children: None     Years of education: None     Highest education level: None   Occupational History     None   Tobacco Use     Smoking status: Never Smoker     Smokeless tobacco: Never Used   Substance and Sexual Activity     Alcohol use: Yes     Alcohol/week: 4.0 standard drinks     Types: 4  "Standard drinks or equivalent per week     Drug use: None     Sexual activity: Yes     Partners: Female     Birth control/protection: None   Other Topics Concern     Parent/sibling w/ CABG, MI or angioplasty before 65F 55M? Not Asked   Social History Narrative    [employer] professor     Social Determinants of Health     Financial Resource Strain:      Difficulty of Paying Living Expenses:    Food Insecurity:      Worried About Running Out of Food in the Last Year:      Ran Out of Food in the Last Year:    Transportation Needs:      Lack of Transportation (Medical):      Lack of Transportation (Non-Medical):    Physical Activity:      Days of Exercise per Week:      Minutes of Exercise per Session:    Stress:      Feeling of Stress :    Social Connections:      Frequency of Communication with Friends and Family:      Frequency of Social Gatherings with Friends and Family:      Attends Anabaptist Services:      Active Member of Clubs or Organizations:      Attends Club or Organization Meetings:      Marital Status:    Intimate Partner Violence:      Fear of Current or Ex-Partner:      Emotionally Abused:      Physically Abused:      Sexually Abused:        Exam:  /73 (BP Location: Right arm, Patient Position: Sitting, Cuff Size: Adult Regular)   Pulse 55   Ht 1.77 m (5' 9.69\")   Wt 78.5 kg (173 lb)   SpO2 100%   BMI 25.05 kg/m    173 lbs 0 oz  Physical Exam   The patient is alert, oriented with a clear sensorium.   Skin shows no lesions or rashes and good turgor.   Head is normocephalic and atraumatic.   Eyes show PERRLA with benign optic fundi.   Ears show normal TMs bilaterally.   Mouth shows clear oral mucosa.   Neck shows no nodes, thyromegaly or bruits.   Chest wall shows tenderness along the lower sternum as well as in the costosternal junctions bilaterally  Back is non tender.  Lungs are clear to percussion and auscultation.   Heart shows normal S1 and S2 without murmur or gallop.   Abdomen is soft and " nontender without masses or organomegaly.   Extremities show no edema and no evidence of active synovitis.   EKG reviewed with him and was normal with sinus bradycardia  CXR normal  Labs reviewed:  Results for orders placed or performed in visit on 09/01/21   XR Chest 2 Views     Status: None    Narrative    EXAMINATION: XR CHEST 2 VW, 9/1/2021 5:38 PM    COMPARISON: None    HISTORY: Chest pain, unspecified type    FINDINGS: Lungs are clear. No pneumothorax. Heart size is normal.      Impression    IMPRESSION: Clear chest.     OMARI PADRON MD         SYSTEM ID:  [id number]   Results for orders placed or performed in visit on 09/01/21   TSH with free T4 reflex     Status: Normal   Result Value Ref Range    TSH 1.33 0.40 - 4.00 mU/L   Comprehensive metabolic panel     Status: Normal   Result Value Ref Range    Sodium 140 133 - 144 mmol/L    Potassium 4.1 3.4 - 5.3 mmol/L    Chloride 105 94 - 109 mmol/L    Carbon Dioxide (CO2) 29 20 - 32 mmol/L    Anion Gap 6 3 - 14 mmol/L    Urea Nitrogen 23 7 - 30 mg/dL    Creatinine 1.01 0.66 - 1.25 mg/dL    Calcium 9.1 8.5 - 10.1 mg/dL    Glucose 97 70 - 99 mg/dL    Alkaline Phosphatase 78 40 - 150 U/L    AST 24 0 - 45 U/L    ALT 36 0 - 70 U/L    Protein Total 8.3 6.8 - 8.8 g/dL    Albumin 4.7 3.4 - 5.0 g/dL    Bilirubin Total 0.7 0.2 - 1.3 mg/dL    GFR Estimate >90 >60 mL/min/1.73m2   Lipid Profile     Status: Abnormal   Result Value Ref Range    Cholesterol 209 (H) <200 mg/dL    Triglycerides 50 <150 mg/dL    Direct Measure HDL 88 >=40 mg/dL    LDL Cholesterol Calculated 111 (H) <=100 mg/dL    Non HDL Cholesterol 121 <130 mg/dL    Patient Fasting > 8hrs? Yes    Erythrocyte sedimentation rate auto     Status: Normal   Result Value Ref Range    Erythrocyte Sedimentation Rate 6 0 - 15 mm/hr   CBC with Platelets & Differential     Status: None    Narrative    The following orders were created for panel order CBC with Platelets & Differential.  Procedure                                Abnormality         Status                     ---------                               -----------         ------                     CBC with platelets and d...[[id number]]                      Final result                 Please view results for these tests on the individual orders.   CK total     Status: Normal   Result Value Ref Range     30 - 300 U/L   CRP inflammation     Status: Normal   Result Value Ref Range    CRP Inflammation <2.9 0.0 - 8.0 mg/L   CBC with platelets and differential     Status: None   Result Value Ref Range    WBC Count 5.3 4.0 - 11.0 10e3/uL    RBC Count 4.76 4.40 - 5.90 10e6/uL    Hemoglobin 14.8 13.3 - 17.7 g/dL    Hematocrit 42.6 40.0 - 53.0 %    MCV 90 78 - 100 fL    MCH 31.1 26.5 - 33.0 pg    MCHC 34.7 31.5 - 36.5 g/dL    RDW 12.2 10.0 - 15.0 %    Platelet Count 168 150 - 450 10e3/uL    % Neutrophils 55 %    % Lymphocytes 32 %    % Monocytes 11 %    % Eosinophils 1 %    % Basophils 1 %    % Immature Granulocytes 0 %    NRBCs per 100 WBC 0 <1 /100    Absolute Neutrophils 2.9 1.6 - 8.3 10e3/uL    Absolute Lymphocytes 1.7 0.8 - 5.3 10e3/uL    Absolute Monocytes 0.6 0.0 - 1.3 10e3/uL    Absolute Eosinophils 0.1 0.0 - 0.7 10e3/uL    Absolute Basophils 0.0 0.0 - 0.2 10e3/uL    Absolute Immature Granulocytes 0.0 <=0.0 10e3/uL    Absolute NRBCs 0.0 10e3/uL   Results for orders placed or performed in visit on 09/01/21   EKG Performed in Clinic w/ Provider Reading Fee     Status: None   Result Value Ref Range    Systolic Blood Pressure  mmHg    Diastolic Blood Pressure  mmHg    Ventricular Rate 53 BPM    Atrial Rate 53 BPM    NM Interval 160 ms    QRS Duration 92 ms     ms    QTc 386 ms    P Axis 64 degrees    R AXIS 48 degrees    T Axis 48 degrees    Interpretation ECG       Sinus bradycardia  Otherwise normal ECG  No previous ECGs available  Unconfirmed report - this is a computer generated report only - please see the primary care center for final interpretation  Confirmed by  - [facility], PHYSICIAN ([id number]),  SANTOSH BLACK ([id number]) on 9/1/2021 7:54:15 PM         ASSESSMENT  1 chest wall pain  2 Mild hyperlipidemia    Plan  Recheck his chest x-ray and labs.  We will treat him with topical salicylate heat and naproxen 500 twice daily for 2 weeks.  If symptoms are not improved or resolved he will get back in touch with us.  Over 30 minutes spent on the day of service in chart review, patient contact, record completion and review and notification of lab reports    This note was completed using Dragon voice recognition software.      Sage Chawla MD  General Internal Medicine  [facility]  [phone]

## 2021-09-01 NOTE — NURSING NOTE
Chief Complaint   Patient presents with     Recheck Medication     pt has been having intermitten mild chest pain, get some lab work done     Physical     pt tried to schedule a physical but schedulers messed up appt       Khadijah Vogt, EMT at 4:12 PM on 9/1/2021

## 2021-09-21 ENCOUNTER — MYC MEDICAL ADVICE (OUTPATIENT)
Dept: INTERNAL MEDICINE | Facility: CLINIC | Age: 36
End: 2021-09-21

## 2021-09-21 DIAGNOSIS — F31.9 BIPOLAR AFFECTIVE DISORDER, REMISSION STATUS UNSPECIFIED (H): Primary | ICD-10-CM

## 2021-09-24 NOTE — CONFIDENTIAL NOTE
[facility] referral signed.  Advise office visit with me if he needs bridge care.  Would request records from previous psych in advance of appt with me.  Thanks,  ML

## 2021-10-01 ENCOUNTER — MYC MEDICAL ADVICE (OUTPATIENT)
Dept: INTERNAL MEDICINE | Facility: CLINIC | Age: 36
End: 2021-10-01

## 2021-10-03 ENCOUNTER — HEALTH MAINTENANCE LETTER (OUTPATIENT)
Age: 36
End: 2021-10-03

## 2021-10-26 DIAGNOSIS — B00.1 RECURRENT COLD SORES: ICD-10-CM

## 2021-10-28 RX ORDER — VALACYCLOVIR HYDROCHLORIDE 1 G/1
TABLET, FILM COATED ORAL
Qty: 12 TABLET | Refills: 1 | Status: SHIPPED | OUTPATIENT
Start: 2021-10-28 | End: 2022-09-20

## 2021-10-28 NOTE — TELEPHONE ENCOUNTER
VALACYCLOVIR HCL 1GM TABS  Last Written Prescription Date:   1/25/2021  Last Fill Quantity: 12,   # refills: 1  Last Office Visit : 9/1/2021  Future Office visit:  None  12 Tabs, 2 Refills sent to benedict Diaz RN  Central Triage Red Flags/Med Refills

## 2022-01-26 ENCOUNTER — OFFICE VISIT (OUTPATIENT)
Dept: OPHTHALMOLOGY | Facility: CLINIC | Age: 37
End: 2022-01-26
Payer: COMMERCIAL

## 2022-01-26 DIAGNOSIS — H52.13 MYOPIA OF BOTH EYES: Primary | ICD-10-CM

## 2022-01-26 DIAGNOSIS — H10.13 ALLERGIC CONJUNCTIVITIS OF BOTH EYES: ICD-10-CM

## 2022-01-26 PROCEDURE — 92014 COMPRE OPH EXAM EST PT 1/>: CPT | Performed by: OPTOMETRIST

## 2022-01-26 PROCEDURE — 92310 CONTACT LENS FITTING OU: CPT | Performed by: OPTOMETRIST

## 2022-01-26 PROCEDURE — 92015 DETERMINE REFRACTIVE STATE: CPT | Performed by: OPTOMETRIST

## 2022-01-26 RX ORDER — OLOPATADINE HYDROCHLORIDE 2 MG/ML
1 SOLUTION/ DROPS OPHTHALMIC DAILY
Qty: 2.5 ML | Refills: 3 | Status: SHIPPED | OUTPATIENT
Start: 2022-01-26 | End: 2022-07-18

## 2022-01-26 RX ORDER — ZOLPIDEM TARTRATE 10 MG/1
TABLET ORAL
COMMUNITY
Start: 2022-01-17

## 2022-01-26 ASSESSMENT — VISUAL ACUITY
VA_OR_OD_CURRENT_RX: 20/25-
OS_CC+: -1
METHOD: SNELLEN - LINEAR
OS_CC: 20/20
CORRECTION_TYPE: GLASSES
OD_CC: 20/20
VA_OR_OS_CURRENT_RX: 20/25-
OS_CC: 20/20
OD_CC: 20/20

## 2022-01-26 ASSESSMENT — TONOMETRY
OD_IOP_MMHG: 17
OS_IOP_MMHG: 16
IOP_METHOD: ICARE

## 2022-01-26 ASSESSMENT — REFRACTION_MANIFEST
OS_SPHERE: -5.25
OD_CYLINDER: +0.75
OD_AXIS: 060
OS_CYLINDER: +0.75
OD_ADD: - -
OD_SPHERE: -5.00
OS_ADD: - -
OS_AXIS: 135

## 2022-01-26 ASSESSMENT — CUP TO DISC RATIO
OS_RATIO: 0.5
OD_RATIO: 0.55

## 2022-01-26 ASSESSMENT — SLIT LAMP EXAM - LIDS
COMMENTS: NORMAL
COMMENTS: NORMAL

## 2022-01-26 ASSESSMENT — REFRACTION_WEARINGRX
SPECS_TYPE: SVL
OD_SPHERE: -4.50
OS_AXIS: 135
OS_CYLINDER: +0.75
OD_CYLINDER: +0.75
OS_SPHERE: -4.75
OD_AXIS: 060

## 2022-01-26 ASSESSMENT — REFRACTION_CURRENTRX
OD_BRAND: PUREVISION 2
OS_SPHERE: -4.00
OS_BRAND: PUREVISION 2
OD_BASECURVE: 8.6
OD_SPHERE: -4.00
OD_DIAMETER: 14.0
OS_BASECURVE: 8.6
OS_DIAMETER: 14.0

## 2022-01-26 ASSESSMENT — CONF VISUAL FIELD
OD_NORMAL: 1
METHOD: COUNTING FINGERS
OS_NORMAL: 1

## 2022-01-26 ASSESSMENT — EXTERNAL EXAM - RIGHT EYE: OD_EXAM: NORMAL

## 2022-01-26 ASSESSMENT — EXTERNAL EXAM - LEFT EYE: OS_EXAM: NORMAL

## 2022-01-26 NOTE — NURSING NOTE
Chief Complaints and History of Present Illnesses   Patient presents with     Annual Eye Exam     Chief Complaint(s) and History of Present Illness(es)     Annual Eye Exam     Laterality: both eyes    Course: stable    Associated symptoms: dryness.  Negative for eye pain, floaters, flashes, swelling, itching, discharge, redness and tearing    Treatments tried: artificial tears    Pain scale: 0/10              Comments     Pt here today for annual routine eye exam.  MIKKI was here 2/24/21. States he is wondering if his prescription has changed.  Pt denies eye pain or discomforts. Would like CL update today as well.    Ocular meds = none    Yamil MURRAY, LUANN 12:23 PM 01/26/2022

## 2022-01-26 NOTE — PROGRESS NOTES
History  HPI     Annual Eye Exam     In both eyes.  Since onset it is stable.  Associated symptoms include dryness.  Negative for eye pain, floaters, flashes, swelling, itching, discharge, redness and tearing.  Treatments tried include artificial tears.  Pain was noted as 0/10.              Comments     Pt here today for annual routine eye exam.  MIKKI was here 2/24/21. States he is wondering if his prescription has changed.  Pt denies eye pain or discomforts. Would like CL update today as well.    Ocular meds = none    Yamil Chung COA, LUANN 12:23 PM 01/26/2022               Last edited by Yamil Chung on 1/26/2022 12:36 PM. (History)          Assessment/Plan  (H52.13) Myopia of both eyes  (primary encounter diagnosis)  Comment: Myopia both eyes, happy with current contact lenses  Plan: REFRACTION, IN CONTACT LENS FITTING COSMETIC LVL 1 - ADULT         Educated patient on condition and clinical findings. Dispensed spectacle prescription for full time wear. Monitor annually.   Dispensed finalized contact lens prescription for 2 years.    (H10.13) Allergic conjunctivitis of both eyes  Comment: Symptomatic with occasional dryness, worse with contacts  Plan: olopatadine (PATADAY) 0.2 % ophthalmic solution         Prescribed Pataday once every day both eyes as needed.    Return to clinic in 1 year for comprehensive eye exam.    Complete documentation of historical and exam elements from today's encounter can  be found in the full encounter summary report (not reduplicated in this progress  note). I personally obtained the chief complaint(s) and history of present illness. I  confirmed and edited as necessary the review of systems, past medical/surgical  history, family history, social history, and examination findings as documented by  others; and I examined the patient myself. I personally reviewed the relevant tests,  images, and reports as documented above. I formulated and edited as necessary the  assessment  and plan and discussed the findings and management plan with the  patient and family.    Haris Tyson OD, FAAO

## 2022-05-15 ENCOUNTER — HEALTH MAINTENANCE LETTER (OUTPATIENT)
Age: 37
End: 2022-05-15

## 2022-06-06 ENCOUNTER — OFFICE VISIT (OUTPATIENT)
Dept: FAMILY MEDICINE | Facility: CLINIC | Age: 37
End: 2022-06-06
Payer: COMMERCIAL

## 2022-06-06 VITALS
SYSTOLIC BLOOD PRESSURE: 124 MMHG | DIASTOLIC BLOOD PRESSURE: 76 MMHG | TEMPERATURE: 98.5 F | HEART RATE: 65 BPM | BODY MASS INDEX: 25.22 KG/M2 | OXYGEN SATURATION: 100 % | HEIGHT: 70 IN | WEIGHT: 176.2 LBS

## 2022-06-06 DIAGNOSIS — H04.123 DRY EYES: ICD-10-CM

## 2022-06-06 DIAGNOSIS — Z79.899 HIGH RISK MEDICATION USE: ICD-10-CM

## 2022-06-06 DIAGNOSIS — F31.70 BIPOLAR AFFECTIVE DISORDER IN REMISSION (H): ICD-10-CM

## 2022-06-06 DIAGNOSIS — Z00.00 ADULT GENERAL MEDICAL EXAM: Primary | ICD-10-CM

## 2022-06-06 PROCEDURE — 99395 PREV VISIT EST AGE 18-39: CPT | Performed by: FAMILY MEDICINE

## 2022-06-06 RX ORDER — QUETIAPINE FUMARATE 50 MG/1
50 TABLET, FILM COATED ORAL AT BEDTIME
COMMUNITY
End: 2024-05-05

## 2022-06-06 RX ORDER — MULTIVITAMIN,THERAPEUTIC
1 TABLET ORAL DAILY
COMMUNITY

## 2022-06-06 ASSESSMENT — ENCOUNTER SYMPTOMS
SHORTNESS OF BREATH: 0
HEARTBURN: 1
NERVOUS/ANXIOUS: 0
DYSURIA: 0
DIZZINESS: 0
PALPITATIONS: 0
FEVER: 0
FREQUENCY: 0
HEMATURIA: 0
DIARRHEA: 0
CHILLS: 0
SORE THROAT: 0
ARTHRALGIAS: 0
JOINT SWELLING: 0
MYALGIAS: 0
EYE PAIN: 0
HEMATOCHEZIA: 0
ABDOMINAL PAIN: 0
PARESTHESIAS: 0
HEADACHES: 0
COUGH: 0
WEAKNESS: 0
CONSTIPATION: 0
NAUSEA: 0

## 2022-06-06 NOTE — PROGRESS NOTES
SUBJECTIVE:   CC: Justo Cardoza is an 36 year old male who presents for preventative health visit.     Takes Seroquel - for bipolar disorder  Since college  Severe insomnia lasting many days - manic episode  Has drowsy effect for him    Takes 50 mg Seroquel - drowsy, but doesn't help   At [facility] -   She prescribes the Zolpidem as well  Patient has taken more Seroquel - at times, and didn't want to take that -     Chest pain - went in to  in fall 2021 - thought it was muscular  Pain is related to moving right arm   basketball - will have stabbing change in chest -   Sometimes picking up his kids - it hurts -     Sometimes at night - ears get red and hot -     Got a 3rd shot of COVID -           Healthy Habits:     Getting at least 3 servings of Calcium per day:  Yes    Bi-annual eye exam:  Yes    Dental care twice a year:  Yes    Sleep apnea or symptoms of sleep apnea:  None    Diet:  Breakfast skipped    Frequency of exercise:  2-3 days/week    Duration of exercise:  45-60 minutes    Taking medications regularly:  Yes    Medication side effects:  None    PHQ-2 Total Score: 1    Additional concerns today:  Yes      Today's PHQ-2 Score:   PHQ-2 ( 1999 Pfizer) 6/6/2022   Q1: Little interest or pleasure in doing things 0   Q2: Feeling down, depressed or hopeless 1   PHQ-2 Score 1   PHQ-2 Total Score (12-17 Years)- Positive if 3 or more points; Administer PHQ-A if positive -   Q1: Little interest or pleasure in doing things Not at all   Q2: Feeling down, depressed or hopeless Several days   PHQ-2 Score 1       Abuse: Current or Past(Physical, Sexual or Emotional)- No  Do you feel safe in your environment? Yes        Social History     Tobacco Use     Smoking status: Never Smoker     Smokeless tobacco: Never Used   Substance Use Topics     Alcohol use: Yes     Types: 4 Standard drinks or equivalent per week     If you drink alcohol do you typically have >3 drinks per day or >7 drinks per week?  No    Alcohol Use 6/6/2022   Prescreen: >3 drinks/day or >7 drinks/week? No   Prescreen: >3 drinks/day or >7 drinks/week? -   No flowsheet data found.    Last PSA: No results found for: PSA    Reviewed orders with patient. Reviewed health maintenance and updated orders accordingly - Yes  BP Readings from Last 3 Encounters:   06/06/22 124/76   09/01/21 117/73   02/17/20 (!) 145/68    Wt Readings from Last 3 Encounters:   06/06/22 79.9 kg (176 lb 3.2 oz)   09/01/21 78.5 kg (173 lb)   02/17/20 78.9 kg (174 lb)                  Patient Active Problem List   Diagnosis     Right ankle pain     Dry eyes     Past Surgical History:   Procedure Laterality Date     APPENDECTOMY  2000     GENITOURINARY SURGERY  2009    Hydrocelectomy     HERNIA REPAIR, INGUINAL RT/LT  1995       Social History     Tobacco Use     Smoking status: Never Smoker     Smokeless tobacco: Never Used   Substance Use Topics     Alcohol use: Yes     Types: 4 Standard drinks or equivalent per week     Family History   Problem Relation Age of Onset     Skin Cancer Father      Depression Father      Skin Cancer Paternal Grandfather      Prostate Cancer Paternal Grandfather      Hypertension Mother      Depression Brother      Glaucoma No family hx of      Macular Degeneration No family hx of          Current Outpatient Medications   Medication Sig Dispense Refill     famotidine (PEPCID) 10 MG tablet Take 10 mg by mouth 2 times daily       multivitamin, therapeutic (THERA-VIT) TABS tablet Take 1 tablet by mouth daily       olopatadine (PATADAY) 0.2 % ophthalmic solution Place 1 drop into both eyes daily 2.5 mL 3     QUEtiapine (SEROQUEL) 50 MG tablet Take 50 mg by mouth At Bedtime       valACYclovir (VALTREX) 1000 mg tablet Take 2 Tablets by mouth when symptoms of cold sores start, and 2 tabs 12  Hours later, for total of 1 day treatment. 12 tablet 1     zolpidem (AMBIEN) 10 MG tablet        No Known Allergies    Reviewed and updated as needed this visit by  "clinical staff   Tobacco  Allergies                 Reviewed and updated as needed this visit by Provider                   Past Medical History:   Diagnosis Date     Bipolar affective disorder in remission (H)      Recurrent cold sores       Past Surgical History:   Procedure Laterality Date     APPENDECTOMY  2000     GENITOURINARY SURGERY  2009    Hydrocelectomy     HERNIA REPAIR, INGUINAL RT/LT  1995     OB History   No obstetric history on file.       Review of Systems   Constitutional: Negative for chills and fever.   HENT: Negative for congestion, ear pain, hearing loss and sore throat.    Eyes: Negative for pain and visual disturbance.   Respiratory: Negative for cough and shortness of breath.    Cardiovascular: Positive for chest pain. Negative for palpitations and peripheral edema.   Gastrointestinal: Positive for heartburn. Negative for abdominal pain, constipation, diarrhea, hematochezia and nausea.   Genitourinary: Negative for dysuria, frequency, genital sores, hematuria, impotence, penile discharge and urgency.   Musculoskeletal: Negative for arthralgias, joint swelling and myalgias.   Skin: Negative for rash.   Neurological: Negative for dizziness, weakness, headaches and paresthesias.   Psychiatric/Behavioral: Negative for mood changes. The patient is not nervous/anxious.          OBJECTIVE:   /76 (BP Location: Right arm, Patient Position: Sitting, Cuff Size: Adult Regular)   Pulse 65   Temp 98.5  F (36.9  C)   Ht 1.778 m (5' 10\")   Wt 79.9 kg (176 lb 3.2 oz)   SpO2 100%   BMI 25.28 kg/m      Physical Exam  Constitutional:       General: He is not in acute distress.     Appearance: He is well-developed.   HENT:      Right Ear: Tympanic membrane and external ear normal.      Left Ear: Tympanic membrane and external ear normal.      Nose: Nose normal.      Mouth/Throat:      Mouth: No oral lesions.      Pharynx: No oropharyngeal exudate.   Eyes:      General:         Right eye: No " discharge.         Left eye: No discharge.      Conjunctiva/sclera: Conjunctivae normal.      Pupils: Pupils are equal, round, and reactive to light.   Neck:      Thyroid: No thyromegaly.      Trachea: No tracheal deviation.   Cardiovascular:      Rate and Rhythm: Normal rate and regular rhythm.      Pulses: Normal pulses.      Heart sounds: Normal heart sounds, S1 normal and S2 normal. No murmur heard.    No S3 or S4 sounds.   Pulmonary:      Effort: Pulmonary effort is normal. No respiratory distress.      Breath sounds: Normal breath sounds. No wheezing or rales.   Abdominal:      General: Bowel sounds are normal.      Palpations: Abdomen is soft. There is no mass.      Tenderness: There is no abdominal tenderness.   Musculoskeletal:         General: No deformity. Normal range of motion.      Cervical back: Neck supple.   Lymphadenopathy:      Cervical: No cervical adenopathy.   Skin:     General: Skin is warm and dry.      Findings: No lesion or rash.   Neurological:      Mental Status: He is alert and oriented to person, place, and time.      Motor: No abnormal muscle tone.      Deep Tendon Reflexes: Reflexes are normal and symmetric.   Psychiatric:         Speech: Speech normal.         Thought Content: Thought content normal.         Judgment: Judgment normal.           Diagnostic Test Results:  Labs reviewed in Epic  No results found for any visits on 06/06/22.    ASSESSMENT/PLAN:   1. Adult general medical exam  This is a 35 yo male her for physical exam   -REVIEW OF HEALTH MAINTENANCE PROTOCOL ORDERS    2. Dry eyes  Patient complains of dry eyes - may be associated with his medications     3. High risk medication use  Patient is on high risk medications (for dipolar disorder) - will check labs related to Seroque   - Comprehensive metabolic panel (BMP + Alb, Alk Phos, ALT, AST, Total. Bili, TP); Future  - Hemoglobin A1c; Future  - TSH; Future  - Lipid Profile (Chol, Trig, HDL, LDL calc); Future  - CBC with  "platelets; Future    4. 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        Patient has been advised of split billing requirements and indicates understanding: Yes    COUNSELING:   Reviewed preventive health counseling, as reflected in patient instructions       Regular exercise       Healthy diet/nutrition    Estimated body mass index is 25.28 kg/m  as calculated from the following:    Height as of this encounter: 1.778 m (5' 10\").    Weight as of this encounter: 79.9 kg (176 lb 3.2 oz).         He reports that he has never smoked. He has never used smokeless tobacco.      Counseling Resources:  ATP IV Guidelines  Pooled Cohorts Equation Calculator  FRAX Risk Assessment  ICSI Preventive Guidelines  Dietary Guidelines for Americans, 2010  USDA's MyPlate  ASA Prophylaxis  Lung CA Screening    MISSAEL MENDOZA MD  [facility]  "

## 2022-06-15 ENCOUNTER — LAB (OUTPATIENT)
Dept: LAB | Facility: CLINIC | Age: 37
End: 2022-06-15
Payer: COMMERCIAL

## 2022-06-15 DIAGNOSIS — Z79.899 HIGH RISK MEDICATION USE: ICD-10-CM

## 2022-06-15 LAB
ALBUMIN SERPL-MCNC: 4.5 G/DL (ref 3.5–5)
ALP SERPL-CCNC: 77 U/L (ref 45–120)
ALT SERPL W P-5'-P-CCNC: 28 U/L (ref 0–45)
ANION GAP SERPL CALCULATED.3IONS-SCNC: 12 MMOL/L (ref 5–18)
AST SERPL W P-5'-P-CCNC: 29 U/L (ref 0–40)
BILIRUB SERPL-MCNC: 0.7 MG/DL (ref 0–1)
BUN SERPL-MCNC: 18 MG/DL (ref 8–22)
CALCIUM SERPL-MCNC: 9.7 MG/DL (ref 8.5–10.5)
CHLORIDE BLD-SCNC: 103 MMOL/L (ref 98–107)
CHOLEST SERPL-MCNC: 173 MG/DL
CO2 SERPL-SCNC: 25 MMOL/L (ref 22–31)
CREAT SERPL-MCNC: 0.91 MG/DL (ref 0.7–1.3)
ERYTHROCYTE [DISTWIDTH] IN BLOOD BY AUTOMATED COUNT: 12.7 % (ref 10–15)
FASTING STATUS PATIENT QL REPORTED: YES
GFR SERPL CREATININE-BSD FRML MDRD: >90 ML/MIN/1.73M2
GLUCOSE BLD-MCNC: 91 MG/DL (ref 70–125)
HBA1C MFR BLD: 4.9 % (ref 0–5.6)
HCT VFR BLD AUTO: 41.7 % (ref 40–53)
HDLC SERPL-MCNC: 66 MG/DL
HGB BLD-MCNC: 14.2 G/DL (ref 13.3–17.7)
LDLC SERPL CALC-MCNC: 86 MG/DL
MCH RBC QN AUTO: 30.2 PG (ref 26.5–33)
MCHC RBC AUTO-ENTMCNC: 34.1 G/DL (ref 31.5–36.5)
MCV RBC AUTO: 89 FL (ref 78–100)
PLATELET # BLD AUTO: 172 10E3/UL (ref 150–450)
POTASSIUM BLD-SCNC: 3.7 MMOL/L (ref 3.5–5)
PROT SERPL-MCNC: 8 G/DL (ref 6–8)
RBC # BLD AUTO: 4.7 10E6/UL (ref 4.4–5.9)
SODIUM SERPL-SCNC: 140 MMOL/L (ref 136–145)
TRIGL SERPL-MCNC: 107 MG/DL
TSH SERPL DL<=0.005 MIU/L-ACNC: 2.17 UIU/ML (ref 0.3–5)
WBC # BLD AUTO: 6 10E3/UL (ref 4–11)

## 2022-06-15 PROCEDURE — 85027 COMPLETE CBC AUTOMATED: CPT

## 2022-06-15 PROCEDURE — 80053 COMPREHEN METABOLIC PANEL: CPT

## 2022-06-15 PROCEDURE — 83036 HEMOGLOBIN GLYCOSYLATED A1C: CPT

## 2022-06-15 PROCEDURE — 84443 ASSAY THYROID STIM HORMONE: CPT

## 2022-06-15 PROCEDURE — 36415 COLL VENOUS BLD VENIPUNCTURE: CPT

## 2022-06-15 PROCEDURE — 80061 LIPID PANEL: CPT

## 2022-07-18 DIAGNOSIS — H10.13 ALLERGIC CONJUNCTIVITIS OF BOTH EYES: ICD-10-CM

## 2022-07-18 RX ORDER — OLOPATADINE HYDROCHLORIDE 2 MG/ML
1 SOLUTION/ DROPS OPHTHALMIC DAILY
Qty: 2.5 ML | Refills: 7 | Status: SHIPPED | OUTPATIENT
Start: 2022-07-18 | End: 2023-05-08

## 2022-07-18 NOTE — TELEPHONE ENCOUNTER
OLOPATADINE HCL 0.2% SOLN  Last Written Prescription Date:  1/26/2022  Last Fill Quantity: 2.5,   # refills: 3  Last Office Visit :  1/26/2022  Future Office visit:  None     Haris Tyson, OD  Optometry       Assessment/Plan  (H52.13) Myopia of both eyes  (primary encounter diagnosis)  Comment: Myopia both eyes, happy with current contact lenses  Plan: REFRACTION, [place] CONTACT LENS FITTING COSMETIC LVL 1 - ADULT              Educated patient on condition and clinical findings. Dispensed spectacle prescription for full time wear. Monitor annually.              Dispensed finalized contact lens prescription for 2 years.     (H10.13) Allergic conjunctivitis of both eyes  Comment: Symptomatic with occasional dryness, worse with contacts  Plan: olopatadine (PATADAY) 0.2 % ophthalmic solution              Prescribed Pataday once every day both eyes as needed.     Return to clinic in 1 year for comprehensive eye exam.    2.5 mL, 7 Refills sent to pharmacy       Marce Diaz RN  Central Triage Red Flags/Med Refills

## 2022-09-11 ENCOUNTER — HEALTH MAINTENANCE LETTER (OUTPATIENT)
Age: 37
End: 2022-09-11

## 2022-09-16 DIAGNOSIS — B00.1 RECURRENT COLD SORES: ICD-10-CM

## 2022-09-20 DIAGNOSIS — B00.1 RECURRENT COLD SORES: ICD-10-CM

## 2022-09-20 RX ORDER — VALACYCLOVIR HYDROCHLORIDE 1 G/1
TABLET, FILM COATED ORAL
Qty: 12 TABLET | Refills: 0 | Status: SHIPPED | OUTPATIENT
Start: 2022-09-20 | End: 2022-12-15

## 2022-09-20 RX ORDER — VALACYCLOVIR HYDROCHLORIDE 1 G/1
TABLET, FILM COATED ORAL
Qty: 12 TABLET | Refills: 1 | OUTPATIENT
Start: 2022-09-20

## 2022-12-14 DIAGNOSIS — B00.1 RECURRENT COLD SORES: ICD-10-CM

## 2022-12-15 RX ORDER — VALACYCLOVIR HYDROCHLORIDE 1 G/1
TABLET, FILM COATED ORAL
Qty: 12 TABLET | OUTPATIENT
Start: 2022-12-15

## 2022-12-15 NOTE — TELEPHONE ENCOUNTER
VALACYCLOVIR HCL 1GM TABS      Last Written Prescription Date:  9/20/22  Last Fill Quantity: 12,   # refills: 0  Last Office Visit : 9/1/21  Future Office visit:  none    Routing refill request to provider for review/approval because:  Overdue for office visit  Was already given 90 day josie refill

## 2022-12-16 ENCOUNTER — MYC MEDICAL ADVICE (OUTPATIENT)
Dept: FAMILY MEDICINE | Facility: CLINIC | Age: 37
End: 2022-12-16

## 2022-12-16 DIAGNOSIS — B00.1 RECURRENT COLD SORES: ICD-10-CM

## 2022-12-20 RX ORDER — VALACYCLOVIR HYDROCHLORIDE 1 G/1
TABLET, FILM COATED ORAL
Qty: 12 TABLET | Refills: 1 | Status: SHIPPED | OUTPATIENT
Start: 2022-12-20 | End: 2023-07-02

## 2022-12-20 NOTE — TELEPHONE ENCOUNTER
RN called patient regarding his mychart message.     Patient is currently in the [facility]. Patient is asking Dr. Benton to send the prescription to [facility] [employer] [facility] [address].     RN will route this encounter to  to review and advise.          Laurence Savage RN  [facility]

## 2023-04-13 ASSESSMENT — ENCOUNTER SYMPTOMS
FREQUENCY: 0
NAUSEA: 0
COUGH: 0
EYE PAIN: 0
HEARTBURN: 0
PALPITATIONS: 1
NERVOUS/ANXIOUS: 0
ABDOMINAL PAIN: 0
SORE THROAT: 0
CHILLS: 0
SHORTNESS OF BREATH: 0
DIZZINESS: 0
MYALGIAS: 0
WEAKNESS: 0
PARESTHESIAS: 0
FEVER: 0
HEADACHES: 0
HEMATOCHEZIA: 0
JOINT SWELLING: 0
DIARRHEA: 0
ARTHRALGIAS: 0
DYSURIA: 0
CONSTIPATION: 0
HEMATURIA: 0

## 2023-04-20 ENCOUNTER — OFFICE VISIT (OUTPATIENT)
Dept: FAMILY MEDICINE | Facility: CLINIC | Age: 38
End: 2023-04-20
Payer: COMMERCIAL

## 2023-04-20 VITALS
SYSTOLIC BLOOD PRESSURE: 126 MMHG | WEIGHT: 172 LBS | HEART RATE: 65 BPM | HEIGHT: 70 IN | TEMPERATURE: 97.2 F | DIASTOLIC BLOOD PRESSURE: 73 MMHG | RESPIRATION RATE: 12 BRPM | BODY MASS INDEX: 24.62 KG/M2 | OXYGEN SATURATION: 99 %

## 2023-04-20 DIAGNOSIS — Z13.220 LIPID SCREENING: ICD-10-CM

## 2023-04-20 DIAGNOSIS — Z00.00 ANNUAL PHYSICAL EXAM: Primary | ICD-10-CM

## 2023-04-20 DIAGNOSIS — R07.9 CHEST PAIN, UNSPECIFIED TYPE: ICD-10-CM

## 2023-04-20 DIAGNOSIS — B00.1 COLD SORE: ICD-10-CM

## 2023-04-20 DIAGNOSIS — F31.9 BIPOLAR AFFECTIVE DISORDER, REMISSION STATUS UNSPECIFIED (H): ICD-10-CM

## 2023-04-20 DIAGNOSIS — R00.2 PALPITATIONS: ICD-10-CM

## 2023-04-20 DIAGNOSIS — Z13.1 SCREENING FOR DIABETES MELLITUS: ICD-10-CM

## 2023-04-20 LAB
CHOLEST SERPL-MCNC: 209 MG/DL
HBA1C MFR BLD: 4.9 % (ref 0–5.6)
HDLC SERPL-MCNC: 90 MG/DL
LDLC SERPL CALC-MCNC: 106 MG/DL
NONHDLC SERPL-MCNC: 119 MG/DL
TRIGL SERPL-MCNC: 65 MG/DL

## 2023-04-20 PROCEDURE — 99395 PREV VISIT EST AGE 18-39: CPT | Performed by: FAMILY MEDICINE

## 2023-04-20 PROCEDURE — 80061 LIPID PANEL: CPT | Performed by: FAMILY MEDICINE

## 2023-04-20 PROCEDURE — 83036 HEMOGLOBIN GLYCOSYLATED A1C: CPT | Performed by: FAMILY MEDICINE

## 2023-04-20 PROCEDURE — 99214 OFFICE O/P EST MOD 30 MIN: CPT | Mod: 25 | Performed by: FAMILY MEDICINE

## 2023-04-20 PROCEDURE — 36415 COLL VENOUS BLD VENIPUNCTURE: CPT | Performed by: FAMILY MEDICINE

## 2023-04-20 NOTE — PROGRESS NOTES
Assessment/Plan    Preventive.  See below orders for screening tests and immunizations.    Palpitations. Pt appears to have a highly variable HR. I am reassured by the fact that pt denies hx of syncope. Will check 48-hr rhythm monitor. Encouraged pt to exercise a few times while wearing monitor.    Chest discomfort. Reassuring ECG in 2021. Low suspicion for symptomatic CAD, but given duration of symptoms, I think it would be reasonable to pursue stress echo.    Red ear syndrome? I've never head of this. Sound similar to erythromelalgia. I'll read up on this and f/u with pt.    Bipolar disorder. Some persistent insomnia. Follow up with psychiatric provider.    F/u annually.    Orders Placed This Encounter   Procedures     Hemoglobin A1c     Lipid panel reflex to direct LDL Fasting     Exercise Stress Test - Adult     Adult Holter Monitor 48 hour     ----  Chief complaint: Physical    Social History     Social History Narrative    Updated 4/20/2023: Grew up in [place]. Lives with wife, 2 kids (born around 2018, 2020). 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     Chest pain  Intermittent issue since at least 2021  Recurred in past few mos  Occurs most days  Episodes last for a few minutes  Pain does not radiate  No associated dyspnea or diaphoresis  Sometimes accompanied by hand paresthesias and palpitations  Stress is a trigger  Exercise is a trigger, though sometimes can exercise w/out chest discomfort  Has never had stress test    Racing heart  Mainly notices this w/ exercise  Accompanied by fatigue at times, but not presyncopal sensation or intense dizziness  Longstanding issue, had HR >200/min after climbing stairs when in high school  Had 24-hr rhythm monitor around age 23, reports this was reassuring   -prompted by irregular HR noted prior to blood donation  No hx of syncope  No known family hx of requiring pacemaker  Per smartwatch, in past 30d, resting HR 65/min and overall HR  "/min  Has never had echo  Caffeine 200 mg/d pill, sometimes cup of coffee later in day    Poor sleep  Bipolar disorder diagnosis in college  Followed by psychiatry  Sometimes takes additional 1/2 tab of quetiapine or OTC antihistamine for sleep    Worried about \"red ear syndrome\"  Ears feel very warm at night, interferes w/ sleep  Doesn't involve extremities, though has hx of finger pallor and pain w/ cold temp exposure  Finger episodes don't occur as often anymore    Exam  /73 (BP Location: Left arm, Patient Position: Sitting, Cuff Size: Adult Regular)   Pulse 65   Temp 97.2  F (36.2  C) (Temporal)   Resp 12   Ht 1.775 m (5' 9.88\")   Wt 78 kg (172 lb)   SpO2 99%   BMI 24.76 kg/m      lung fields CTAB  heart with regular rate and rhythm, no murmurs  "

## 2023-04-22 PROBLEM — B00.1 COLD SORE: Status: ACTIVE | Noted: 2023-04-22

## 2023-04-22 PROBLEM — R00.2 PALPITATIONS: Status: ACTIVE | Noted: 2023-04-22

## 2023-04-22 PROBLEM — F31.70 BIPOLAR AFFECTIVE DISORDER IN REMISSION (H): Status: ACTIVE | Noted: 2023-04-22

## 2023-04-22 PROBLEM — R07.9 CHEST PAIN, UNSPECIFIED TYPE: Status: ACTIVE | Noted: 2023-04-22

## 2023-04-22 PROBLEM — F31.9 BIPOLAR DISORDER (H): Status: ACTIVE | Noted: 2023-04-22

## 2023-04-27 ENCOUNTER — HOSPITAL ENCOUNTER (OUTPATIENT)
Dept: CARDIOLOGY | Facility: CLINIC | Age: 38
Discharge: HOME | End: 2023-04-27
Attending: FAMILY MEDICINE
Payer: COMMERCIAL

## 2023-04-27 ENCOUNTER — MYC MEDICAL ADVICE (OUTPATIENT)
Dept: INTERNAL MEDICINE | Facility: CLINIC | Age: 38
End: 2023-04-27

## 2023-04-27 DIAGNOSIS — R00.2 PALPITATIONS: ICD-10-CM

## 2023-04-27 DIAGNOSIS — R07.9 CHEST PAIN, UNSPECIFIED TYPE: ICD-10-CM

## 2023-04-27 PROCEDURE — 93016 CV STRESS TEST SUPVJ ONLY: CPT | Performed by: INTERNAL MEDICINE

## 2023-04-27 PROCEDURE — 93017 CV STRESS TEST TRACING ONLY: CPT

## 2023-04-27 PROCEDURE — 93018 CV STRESS TEST I&R ONLY: CPT | Performed by: INTERNAL MEDICINE

## 2023-04-27 PROCEDURE — 93225 XTRNL ECG REC<48 HRS REC: CPT

## 2023-04-28 ENCOUNTER — DOCUMENTATION ONLY (OUTPATIENT)
Dept: OTHER | Facility: CLINIC | Age: 38
End: 2023-04-28
Payer: COMMERCIAL

## 2023-05-08 ENCOUNTER — OFFICE VISIT (OUTPATIENT)
Dept: FAMILY MEDICINE | Facility: CLINIC | Age: 38
End: 2023-05-08
Payer: COMMERCIAL

## 2023-05-08 VITALS
HEART RATE: 71 BPM | SYSTOLIC BLOOD PRESSURE: 117 MMHG | WEIGHT: 179.25 LBS | RESPIRATION RATE: 16 BRPM | OXYGEN SATURATION: 97 % | HEIGHT: 70 IN | BODY MASS INDEX: 25.66 KG/M2 | TEMPERATURE: 97.6 F | DIASTOLIC BLOOD PRESSURE: 68 MMHG

## 2023-05-08 DIAGNOSIS — Z11.4 SCREENING FOR HIV (HUMAN IMMUNODEFICIENCY VIRUS): ICD-10-CM

## 2023-05-08 DIAGNOSIS — M54.9 UPPER BACK PAIN: Primary | ICD-10-CM

## 2023-05-08 DIAGNOSIS — M54.12 CERVICAL RADICULOPATHY: ICD-10-CM

## 2023-05-08 DIAGNOSIS — H10.13 ALLERGIC CONJUNCTIVITIS OF BOTH EYES: ICD-10-CM

## 2023-05-08 DIAGNOSIS — Z11.59 NEED FOR HEPATITIS C SCREENING TEST: ICD-10-CM

## 2023-05-08 PROCEDURE — 36415 COLL VENOUS BLD VENIPUNCTURE: CPT | Performed by: FAMILY MEDICINE

## 2023-05-08 PROCEDURE — 87389 HIV-1 AG W/HIV-1&-2 AB AG IA: CPT | Performed by: FAMILY MEDICINE

## 2023-05-08 PROCEDURE — 99214 OFFICE O/P EST MOD 30 MIN: CPT | Performed by: FAMILY MEDICINE

## 2023-05-08 PROCEDURE — 86803 HEPATITIS C AB TEST: CPT | Performed by: FAMILY MEDICINE

## 2023-05-08 RX ORDER — OLOPATADINE HYDROCHLORIDE 2 MG/ML
1 SOLUTION/ DROPS OPHTHALMIC DAILY
Qty: 5 ML | Refills: 3 | Status: SHIPPED | OUTPATIENT
Start: 2023-05-08 | End: 2024-05-02

## 2023-05-08 RX ORDER — NAPROXEN 500 MG/1
500 TABLET ORAL 2 TIMES DAILY WITH MEALS
COMMUNITY
End: 2023-07-02

## 2023-05-08 RX ORDER — CYCLOBENZAPRINE HCL 10 MG
10 TABLET ORAL
Qty: 30 TABLET | Refills: 1 | Status: SHIPPED | OUTPATIENT
Start: 2023-05-08 | End: 2023-07-02

## 2023-05-08 RX ORDER — PREDNISONE 20 MG/1
40 TABLET ORAL DAILY
Qty: 10 TABLET | Refills: 0 | Status: SHIPPED | OUTPATIENT
Start: 2023-05-08 | End: 2023-05-13

## 2023-05-08 ASSESSMENT — ENCOUNTER SYMPTOMS: BACK PAIN: 1

## 2023-05-08 NOTE — TELEPHONE ENCOUNTER
olopatadine (PATADAY) 0.2 % ophthalmic solution     Last Written Prescription Date:  7-  Last Fill Quantity: 2.5 ml,   # refills: 7  Last Office Visit : 1-  Future Office visit:  Not on file      olopatadine (PATADAY) 0.2 % ophthalmic solution

## 2023-05-08 NOTE — PROGRESS NOTES
"  Assessment & Plan       ICD-10-CM    1. Upper back pain  M54.9 predniSONE (DELTASONE) 20 MG tablet     Spine  Referral     cyclobenzaprine (FLEXERIL) 10 MG tablet      2. Screening for HIV (human immunodeficiency virus)  Z11.4 HIV Antigen Antibody Combo     HIV Antigen Antibody Combo      3. Need for hepatitis C screening test  Z11.59 Hepatitis C Screen Reflex to HCV RNA Quant and Genotype     Hepatitis C Screen Reflex to HCV RNA Quant and Genotype      4. Cervical radiculopathy  M54.12 predniSONE (DELTASONE) 20 MG tablet     Spine  Referral     cyclobenzaprine (FLEXERIL) 10 MG tablet        Per another provider who ordered your stress test they are now investigating further with a 48-hour Holter monitor.  I will let them decide if a cardiology consultation is needed.    HIV and Hep C today.    It is possible you have a muscle spasm or possibly an inflamed disc pushing on the nerve.  I am going to prescribe prednisone 40 mg oral daily in the morning with food for 5 days.  No ibuprofen or Naproxen while you are on prednisone.  Tylenol is safe.  When you are off prednisone ibuprofen is fine to use.    I am prescribing a muscle relaxer called cyclobenzaprine 10 mg at night as needed.    I am putting a referral to our spine clinic who can then proceed with physical therapy, injections if needed, MRI if needed.           BMI:   Estimated body mass index is 25.81 kg/m  as calculated from the following:    Height as of this encounter: 1.775 m (5' 9.88\").    Weight as of this encounter: 81.3 kg (179 lb 4 oz).           Adela Barney MD  [facility]    Glen Kemp is a 37 year old, presenting for the following health issues:  Back Pain (Upper back/ left shoulder pain, radiating to elbow -(Started on right side)x 3-4 weeks- no known injury  )        5/8/2023     2:24 PM   Additional Questions   Roomed by Jennifer PEDRO   Accompanied by rafael     Back Pain     History " of Present Illness       Back Pain:  He presents for follow up of back pain. Patient's back pain is a new problem.    Original cause of back pain: other  First noticed back pain: 1-4 weeks ago  Patient feels back pain: constantlyLocation of back pain:  Left upper back and left side of neck  Description of back pain: shooting and stabbing  Back pain spreads: left side of neck    Since patient first noticed back pain, pain is: always present, but gets better and worse  Does back pain interfere with his job:  Yes  On a scale of 1-10 (10 being the worst), patient describes pain as:  7  What makes back pain worse: certain positions and other  Acupuncture: not tried  Acetaminophen: helpful  Activity or exercise: not helpful  Chiropractor:  Not tried  Cold: not tried  Heat: helpful  Massage: not tried  Muscle relaxants: not tried  NSAIDS: helpful  Opioids: not tried  Physical Therapy: not tried  Rest: not helpful  Steroid Injection: not tried  Stretching: not helpful  Surgery: not tried  TENS unit: not tried  Topical pain relievers: not tried  Other healthcare providers patient is seeing for back pain: None    He eats 2-3 servings of fruits and vegetables daily.He consumes 1 sweetened beverage(s) daily.He exercises with enough effort to increase his heart rate 30 to 60 minutes per day.  He exercises with enough effort to increase his heart rate 3 or less days per week.   He is taking medications regularly.     Upper Back pain:  Right upper back pain since mid March, moved to left upper back 1 month ago. Pain does go down left arm.  Tiny bit of numbness and tingling.  History of disc protrusion lower back.  No disc issue in neck.  Traveling and slept on poor mattresses in March.  In Early Dec. Child is in a tumbling class and tried t show him a summersault and injured his neck then. Also right sided neck pain on plane ride in March and pain to turn head to the right. Since March, did not go down right arm. No weakness.  He  "does have some pain over the spine itself.  A standing desk helps a little.  Laying on left side helps some.    No pleuritic chest pain.  Curled up in a weird way on the couch last night, and felt better.            Review of Systems   Musculoskeletal: Positive for back pain.            Objective    /68 (BP Location: Left arm, Patient Position: Sitting, Cuff Size: Adult Large)   Pulse 71   Temp 97.6  F (36.4  C) (Oral)   Resp 16   Ht 1.775 m (5' 9.88\")   Wt 81.3 kg (179 lb 4 oz)   SpO2 97%   BMI 25.81 kg/m    Body mass index is 25.81 kg/m .  Physical Exam   GENERAL: healthy, alert and mild distress  MS: No pain to palpation of the left shoulder and full range of motion of the left shoulder, pain with testing the left rotator cuff, he does have mild pain to palpation of the C6-C7 spinous processes and pain to palpation just proximal to the scapula on the left  Neuro: Deep tendon reflexes 2+ upper extremities, strength 5 out of 5 upper extremities                    "

## 2023-05-08 NOTE — PATIENT INSTRUCTIONS
Per another provider who ordered your stress test they are now investigating further with a 48-hour Holter monitor.  I will let them decide if a cardiology consultation is needed.    HIV and Hep C today.    It is possible you have a muscle spasm or possibly an inflamed disc pushing on the nerve.  I am going to prescribe prednisone 40 mg oral daily in the morning with food for 5 days.  No ibuprofen or Naproxen while you are on prednisone.  Tylenol is safe.  When you are off prednisone ibuprofen is fine to use.    I am prescribing a muscle relaxer called cyclobenzaprine 10 mg at night as needed.    I am putting a referral to our spine clinic who can then proceed with physical therapy, injections if needed, MRI if needed.

## 2023-05-09 LAB
HCV AB SERPL QL IA: NONREACTIVE
HIV 1+2 AB+HIV1 P24 AG SERPL QL IA: NONREACTIVE

## 2023-05-13 ENCOUNTER — MYC MEDICAL ADVICE (OUTPATIENT)
Dept: FAMILY MEDICINE | Facility: CLINIC | Age: 38
End: 2023-05-13
Payer: COMMERCIAL

## 2023-05-13 DIAGNOSIS — M54.12 CERVICAL RADICULOPATHY: Primary | ICD-10-CM

## 2023-05-15 RX ORDER — PREDNISONE 20 MG/1
20 TABLET ORAL DAILY
Qty: 5 TABLET | Refills: 0 | Status: SHIPPED | OUTPATIENT
Start: 2023-05-15 | End: 2023-05-20

## 2023-05-15 NOTE — TELEPHONE ENCOUNTER
"Please see [facility] message regarding back pain that you saw him for on 5/8.    \"It is possible you have a muscle spasm or possibly an inflamed disc pushing on the nerve.  I am going to prescribe prednisone 40 mg oral daily in the morning with food for 5 days.  No ibuprofen or Naproxen while you are on prednisone.  Tylenol is safe.  When you are off prednisone ibuprofen is fine to use.   I am prescribing a muscle relaxer called cyclobenzaprine 10 mg at night as needed.   I am putting a referral to our spine clinic who can then proceed with physical therapy, injections if needed, MRI if needed.\"    Joan Hutson RN    "

## 2023-05-16 ENCOUNTER — TELEPHONE (OUTPATIENT)
Dept: CARDIOLOGY | Facility: CLINIC | Age: 38
End: 2023-05-16
Payer: COMMERCIAL

## 2023-05-16 NOTE — TELEPHONE ENCOUNTER
[facility] Call Center    Phone Message    May a detailed message be left on voicemail: yes     Reason for Call: Other: Patient's provider  was reaching out to speak with the team regarding the patient's holter monitor that was placed in [place]. He is hoping for an update. Please call him back at [phone] to discuss, thank you!     Action Taken: Message routed to:  Other: Cardiology    Travel Screening: Not Applicable

## 2023-05-16 NOTE — TELEPHONE ENCOUNTER
Unable to reach patient. Called Dr. Blue and advised patient will need to return the monitor to the hospital; he will send patient MyChart message with follow up instructions. Padmini Malik RN on 5/16/2023 at 11:54 AM

## 2023-05-18 ENCOUNTER — TELEPHONE (OUTPATIENT)
Dept: FAMILY MEDICINE | Facility: CLINIC | Age: 38
End: 2023-05-18
Payer: COMMERCIAL

## 2023-05-18 NOTE — TELEPHONE ENCOUNTER
Left MRN with [facility] Waiting room to look for missing Holter monitor. Provided my direct number for call back. Padmini Malik RN on 5/18/2023 at 11:57 AM    Unable to reach patient; LM regarding missing Holter and advised I have reached out to the [facility] Waiting [facility]. I will call the patient back with any updates. Padmini Malik RN on 5/18/2023 at 12:04 PM    Spoke with EKG room regarding missing monitor; was advised the report has been read but not linked to the order. Isabel advised she requested it be uploaded into Epic but it could take 1-2 days. Requested faxed report be sent to our clinic as well. Padmini Malik RN on 5/18/2023 at 1:28 PM

## 2023-05-18 NOTE — TELEPHONE ENCOUNTER
"From Cardiology RN, 5/16/23:    \"Unable to reach patient. Called Dr. Blue and advised patient will need to return the monitor to the hospital; he will send patient [employer] message with follow up instructions.\"    From pt via [employer], 5/16/23:    \"I did return the device, right when it was done with the 48 hours of recording my data. I left it at the  as instructed. I am calling them back now to clarify.\"    From pt via [employer], 5/17/23:    \"I made several calls and left a message with Carlyn from the cardiology clinic but was unable to get through to her. Is there some other way to contact them and ask them to check with the  staff at the [facility] Waiting Room?\"    Cardiology clinic staff, please follow up with pt regarding the status of his Holter monitor. Thanks.  "

## 2023-05-18 NOTE — PROGRESS NOTES
Patient seen at the request of Adela Barney for an opinion and evaluation of cervical radiculopathy.      HISTORY OF PRESENT ILLNESS:  Justo Cardoza is a right-handed 37 year old male who presents with a chief complaint of cervical radiculopathy.     He was seen in the clinic today regarding neck pain with left upper extremity numbness and weakness that started in in mid to late March.  He initially noticed some neck pain and stiffness on the right side.  Within a week or so he noticed pain more on the left side.    He cannot recall any particular accident or inciting event, but does note traveling internationally for work in mid March, doing more work on his laptop, and paddle boarding.  He does report remotely having some neck pain after demonstrating a somersault for his child in the past but says the pain resolved on its own after that incident.    Today he describes left-sided neck pain radiating down the back of his left arm.  The upper forearm is most bothersome, but at times the upper arm bothers him too.  He has some pain around the left scapula.  He notices numbness and tingling at the back of his left shoulder for the last 2 days.  He says he might have some mild tingling in the left thumb.    He was seen in the family medicine clinic 5/8/2023 and was prescribed 40 mg of prednisone x5 days from 5/8 to 5/13/2023.  He later received another short course of steroid- 20 mg of prednisone x5 days from 5/15 to 5/20/2023.    Last Thursday when he was at the gym he noticed weakness in the left arm.  He typically does a bench press exercise with free weights.  He was not able to lift as much with the left arm compared to the right.  He was also doing triceps extensions.  On the right arm he could do 22.5 pounds but on the left side he could only do 5 pounds.  He uses an delfino to record his strength workouts and says a couple weeks ago he was able to do that same workout without any difficulty.  Later he was  playing basketball with friends and had trouble doing a left-handed layup.    He states that his pain is somewhat better than before but the weakness is bothering him.  Currently he rates the pain 1/10, sometimes increasing to 4/10.  The pain was improved with oral steroid and rest.    He denies any changes with his fine motor skills, dropping things, gait, stumbling or tripping, bowel or bladder incontinence, or saddle anesthesia.        PRIOR INJURIES/TREATMENT:   Ice/Heat: none, hot shower helps a little    Brace: none    Physical Therapy:   Prior PT - ankle injury, sciatica RLE     - Current Pain Medications -   Tylenol - rarely    - Prior/Trialed Pain Medications -   Prednisone, two 5 day courses  Cyclobenzaprine   Ibuprofen / Naproxen - hasn't been taking it while doing the prednisone       Prior Procedures:  Date    Procedure   Improvement (%)  none              Prior Related Surgery: none     Other (acupuncture, OMT, CMM, TENS, DME, etc.): none  Massage remotely    Specialists Seen - (with most recent, available notes and clinic visits reviewed)   1.  Sports Med -foot, hand, ankle pain  2.  [facility]    IMAGING - reviewed   none    Review Of Systems:  I am responding to those symptoms which are directly relevant to the specific indication for my consultation. I recommend that the patient follow up with their primary or referring provider to pursue any other symptoms which may be of concern.       Medical History:  He  has a past medical history of Bipolar affective disorder in remission (H), Recurrent cold sores, and Varicella.     He  has a past surgical history that includes Abdomen surgery (2009); hernia repair, inguinal rt/lt (1995); and appendectomy (2000).    Family History  His family history includes Coronary Artery Disease in his paternal grandfather; Depression in his brother and father; Hypertension in his mother; Prostate Cancer in his paternal grandfather; Skin  Cancer in his father and paternal grandfather.     Social History:  Work: professor of applied economics at [employer]  Current living situation: lives spouse and kids.   He reports that he has never smoked. He has never used smokeless tobacco. He reports current alcohol use. He reports that he does not use drugs.        Current Medications:   He has a current medication list which includes the following prescription(s): prednisone, cyclobenzaprine, famotidine, multivitamin, therapeutic, naproxen, olopatadine, quetiapine, valacyclovir, and zolpidem.     Allergies:   No Known Allergies    PHYSICAL EXAMINATION:  There were no vitals taken for this visit.    --CONSTITUTIONAL: Vital signs as above. No acute distress. The patient is well nourished and well groomed.  --PSYCHIATRIC: The patient is awake, alert, oriented to person, place, time and answering questions appropriately with clear speech. Appropriate mood and affect   --HEENT: Sclera are non-injected. Extraocular muscles are intact. Moist oral mucosa.  --SKIN: observable skin is clean, dry, intact without rashes.  --RESPIRATORY: Normal rhythm and effort. No abnormal accessory muscle breathing patterns noted.   --GROSS MOTOR: Easily arises from a seated position. Gait is non-antalgic.Toe walking and heel walking are normal.  Walks heel to toe in a straight line without difficulty.   --CERVICAL SPINE: Inspection reveals no evidence of deformity. Range of motion is not limited in cervical flexion, extension, lateral rotation. Pain elicited with cervical flexion and looking over right shoulder. No tenderness to palpation cervical spine.  Spurling maneuver elicits neck/upper back pain bilaterally but no radicular symptoms.  --SHOULDERS: Full range of motion bilaterally: abduction, flexion, cross chest movement internal/external rotation.   --UPPER EXTREMITY MOTOR TESTING:  Wrist flexion left 5/5, right 5/5  Wrist extension left 5/5, right 5/5  Biceps left 5/5, right 5/5    Triceps left 4/5, right 5/5   Shoulder abduction left 5/5, right 5/5   left 4+/5, right 5/5  Finger abduction left 4+/5, right 5/5  --NEUROLOGIC: CN III-XII are grossly intact.   2/4 symmetric biceps, brachioradialis, triceps reflexes bilaterally. Sensation to upper extremities is intact.  Negative Lorenzo's bilaterally.    --VASCULAR: Warm upper limbs bilaterally. 2/4 radial pulses bilaterally.       ASSESSMENT:  Justo Cardoza is a pleasant 37 year old male who presents with neck and upper back pain with left upper extremity paresthesias and left triceps weakness.  He has left-sided neck, upper back, and scapular pain with pain extending into the back of his left arm and left triceps weakness suspicious for C7 nerve irritation    # neck pain  # left upper extremity weakness  # numbness - left upper extremity weakness    PLAN:  -Add XR of the cervical spine  -In the setting neck and upper back pain with left upper extremity weakness, will also add MRI cervical spine  -add gabapentin 300 mg to be slowly increased as tolerated according to the chart in the after visit summary  -add referral for PT  -we discussed possible referral for cervical level epidural steroid injection pending imaging results  - RTC for review of MRI imaging and discuss next steps    Ready to learn, no apparent learning barriers.  Education provided on treatment plan according to patient's preferred learning style.  Patient verbalizes understanding.   __________________________________  Shweta Rawls NP  Physical Medicine & Rehabilitation        60 minutes spent by me on the date of the encounter doing chart review, history and exam, documentation and further activities per the note

## 2023-05-22 ENCOUNTER — OFFICE VISIT (OUTPATIENT)
Dept: PHYSICAL MEDICINE AND REHAB | Facility: CLINIC | Age: 38
End: 2023-05-22
Attending: FAMILY MEDICINE
Payer: COMMERCIAL

## 2023-05-22 ENCOUNTER — ANCILLARY PROCEDURE (OUTPATIENT)
Dept: GENERAL RADIOLOGY | Facility: CLINIC | Age: 38
End: 2023-05-22
Attending: NURSE PRACTITIONER
Payer: COMMERCIAL

## 2023-05-22 DIAGNOSIS — M54.9 UPPER BACK PAIN: ICD-10-CM

## 2023-05-22 DIAGNOSIS — R29.898 LEFT ARM WEAKNESS: ICD-10-CM

## 2023-05-22 DIAGNOSIS — M54.12 CERVICAL RADICULOPATHY: ICD-10-CM

## 2023-05-22 DIAGNOSIS — M54.2 NECK PAIN: ICD-10-CM

## 2023-05-22 DIAGNOSIS — R29.898 LEFT ARM WEAKNESS: Primary | ICD-10-CM

## 2023-05-22 PROCEDURE — 99215 OFFICE O/P EST HI 40 MIN: CPT | Performed by: NURSE PRACTITIONER

## 2023-05-22 PROCEDURE — 72040 X-RAY EXAM NECK SPINE 2-3 VW: CPT | Performed by: RADIOLOGY

## 2023-05-22 RX ORDER — GABAPENTIN 300 MG/1
300 CAPSULE ORAL 3 TIMES DAILY
Qty: 90 CAPSULE | Refills: 1 | Status: SHIPPED | OUTPATIENT
Start: 2023-05-22 | End: 2023-10-16

## 2023-05-22 NOTE — PATIENT INSTRUCTIONS
It was a pleasure seeing you in the clinic today.  As discussed, we made the following updates to your plan of care:       An XR order was added today.      A MRI order was added today.  Radiology will call you to schedule. You may also call [facility] Imaging Scheduling directly if you do not hear from them in the next couple of days.    Imaging scheduling  [facility] [phone] Clinics and Surgery Center [facility] ([facility] and [facility]), [facility], including [facility], [facility] [phone] [facility], [facility] including [place], [place], [place], [place], and [facility] [phone] [facility], [facility], [facility], [facility] clinics, including [place], [facility], and [facility] [phone] [facility], [facility], [facility], including [place], [facility], and [place]     An order for physical therapy was added today. Physical therapy schedulers should call you in the next few days to schedule an appointment. You may also call [phone] to arrange an appointment at any of the [facility] outpatient physical therapy locations.  It will be very important for you to do the physical therapy exercises on a regular basis to decrease your pain and prevent future flares of pain.     Gabapentin (Neurontin) was ordered today which you can slowly increase as tolerated according to the chart below.            AM        PM       BEDTIME    Day 0-5         -            -             300mg  Day 5-10      300       -              300mg  Day 10+       300       300         300mg    Continue to increase your dose as discussed above if you are not experiencing any side effects (in other words - if you experience side effects do not progress to the next week). If you are  experiencing any side effects, return to the previous dose that was tolerable and continue until follow-up.     The most common side effect is sedation. Do not drive a motor vehicle until after you are familiar with how the medication may impact your attention and reaction.    Note that it may take several weeks to notice the benefits of this medication.   Do not abruptly stopped this medication prior to consulting with a physician.    Let's follow up in the clinic to go over your MRI results and also after 6 weeks of PT.     Thank you,  Shweta Rawls NP  Physical Medicine and Rehabilitation, Medical Spine  PM&R clinic Phone: [phone]  PM[facility]R clinic Fax: [phone]

## 2023-05-22 NOTE — NURSING NOTE
Chief Complaint   Patient presents with     Consult     back pain that travels down the left side of shoulder to mid arm with tingling and numbness  since ( mid march) has gotten worse since then  Per patient last Thursday lost a lot of functionality in that arm, could not lift weights as usually  5 pounds with left arm is almost non existant      Lin Butler CMA at 12:59 PM on 5/22/2023.

## 2023-05-22 NOTE — LETTER
5/22/2023       RE: Justo Cardoza  [address] [address]     Dear Colleague,    Thank you for referring your patient, Justo Cardoza, to the [facility] PHYSICAL MEDICINE AND REHABILITATION CLINIC [place] [facility]. Please see a copy of my visit note below.    Patient seen at the request of Adela Barney for an opinion and evaluation of cervical radiculopathy.      HISTORY OF PRESENT ILLNESS:  Justo Cardoza is a right-handed 37 year old male who presents with a chief complaint of cervical radiculopathy.     He was seen in the clinic today regarding neck pain with left upper extremity numbness and weakness that started in in mid to late March.  He initially noticed some neck pain and stiffness on the right side.  Within a week or so he noticed pain more on the left side.    He cannot recall any particular accident or inciting event, but does note traveling internationally for work in mid March, doing more work on his laptop, and paddle boarding.  He does report remotely having some neck pain after demonstrating a somersault for his child in the past but says the pain resolved on its own after that incident.    Today he describes left-sided neck pain radiating down the back of his left arm.  The upper forearm is most bothersome, but at times the upper arm bothers him too.  He has some pain around the left scapula.  He notices numbness and tingling at the back of his left shoulder for the last 2 days.  He says he might have some mild tingling in the left thumb.    He was seen in the family medicine clinic 5/8/2023 and was prescribed 40 mg of prednisone x5 days from 5/8 to 5/13/2023.  He later received another short course of steroid- 20 mg of prednisone x5 days from 5/15 to 5/20/2023.    Last Thursday when he was at the gym he noticed weakness in the left arm.  He typically does a bench press exercise with free weights.  He was not  able to lift as much with the left arm compared to the right.  He was also doing triceps extensions.  On the right arm he could do 22.5 pounds but on the left side he could only do 5 pounds.  He uses an delfino to record his strength workouts and says a couple weeks ago he was able to do that same workout without any difficulty.  Later he was playing basketball with friends and had trouble doing a left-handed layup.    He states that his pain is somewhat better than before but the weakness is bothering him.  Currently he rates the pain 1/10, sometimes increasing to 4/10.  The pain was improved with oral steroid and rest.    He denies any changes with his fine motor skills, dropping things, gait, stumbling or tripping, bowel or bladder incontinence, or saddle anesthesia.        PRIOR INJURIES/TREATMENT:   Ice/Heat: none, hot shower helps a little    Brace: none    Physical Therapy:   Prior PT - ankle injury, sciatica RLE     - Current Pain Medications -   Tylenol - rarely    - Prior/Trialed Pain Medications -   Prednisone, two 5 day courses  Cyclobenzaprine   Ibuprofen / Naproxen - hasn't been taking it while doing the prednisone       Prior Procedures:  Date    Procedure   Improvement (%)  none              Prior Related Surgery: none     Other (acupuncture, OMT, CMM, TENS, DME, etc.): none  Massage remotely    Specialists Seen - (with most recent, available notes and clinic visits reviewed)   1.  Sports Med -foot, hand, ankle pain  2.  Orthopedics- [facility] left hand fracture    IMAGING - reviewed   none    Review Of Systems:  I am responding to those symptoms which are directly relevant to the specific indication for my consultation. I recommend that the patient follow up with their primary or referring provider to pursue any other symptoms which may be of concern.       Medical History:  He  has a past medical history of Bipolar affective disorder in remission (H), Recurrent cold sores, and Varicella.     He  has a  past surgical history that includes Abdomen surgery (2009); hernia repair, inguinal rt/lt (1995); and appendectomy (2000).    Family History  His family history includes Coronary Artery Disease in his paternal grandfather; Depression in his brother and father; Hypertension in his mother; Prostate Cancer in his paternal grandfather; Skin Cancer in his father and paternal grandfather.     Social History:  Work: professor of applied economics at [employer]  Current living situation: lives spouse and kids.   He reports that he has never smoked. He has never used smokeless tobacco. He reports current alcohol use. He reports that he does not use drugs.        Current Medications:   He has a current medication list which includes the following prescription(s): prednisone, cyclobenzaprine, famotidine, multivitamin, therapeutic, naproxen, olopatadine, quetiapine, valacyclovir, and zolpidem.     Allergies:   No Known Allergies    PHYSICAL EXAMINATION:  There were no vitals taken for this visit.    --CONSTITUTIONAL: Vital signs as above. No acute distress. The patient is well nourished and well groomed.  --PSYCHIATRIC: The patient is awake, alert, oriented to person, place, time and answering questions appropriately with clear speech. Appropriate mood and affect   --HEENT: Sclera are non-injected. Extraocular muscles are intact. Moist oral mucosa.  --SKIN: observable skin is clean, dry, intact without rashes.  --RESPIRATORY: Normal rhythm and effort. No abnormal accessory muscle breathing patterns noted.   --GROSS MOTOR: Easily arises from a seated position. Gait is non-antalgic.Toe walking and heel walking are normal.  Walks heel to toe in a straight line without difficulty.   --CERVICAL SPINE: Inspection reveals no evidence of deformity. Range of motion is not limited in cervical flexion, extension, lateral rotation. Pain elicited with cervical flexion and looking over right shoulder. No tenderness to palpation cervical spine.   Spurling maneuver elicits neck/upper back pain bilaterally but no radicular symptoms.  --SHOULDERS: Full range of motion bilaterally: abduction, flexion, cross chest movement internal/external rotation.   --UPPER EXTREMITY MOTOR TESTING:  Wrist flexion left 5/5, right 5/5  Wrist extension left 5/5, right 5/5  Biceps left 5/5, right 5/5   Triceps left 4/5, right 5/5   Shoulder abduction left 5/5, right 5/5   left 4+/5, right 5/5  Finger abduction left 4+/5, right 5/5  --NEUROLOGIC: CN III-XII are grossly intact.   2/4 symmetric biceps, brachioradialis, triceps reflexes bilaterally. Sensation to upper extremities is intact.  Negative Lorenzo's bilaterally.    --VASCULAR: Warm upper limbs bilaterally. 2/4 radial pulses bilaterally.       ASSESSMENT:  Justo Cardoza is a pleasant 37 year old male who presents with neck and upper back pain with left upper extremity paresthesias and left triceps weakness.  He has left-sided neck, upper back, and scapular pain with pain extending into the back of his left arm and left triceps weakness suspicious for C7 nerve irritation    # neck pain  # left upper extremity weakness  # numbness - left upper extremity weakness    PLAN:  -Add XR of the cervical spine  -In the setting neck and upper back pain with left upper extremity weakness, will also add MRI cervical spine  -add gabapentin 300 mg to be slowly increased as tolerated according to the chart in the after visit summary  -add referral for PT  -we discussed possible referral for cervical level epidural steroid injection pending imaging results  - RTC for review of MRI imaging and discuss next steps    Ready to learn, no apparent learning barriers.  Education provided on treatment plan according to patient's preferred learning style.  Patient verbalizes understanding.   __________________________________  Shweta Rawls NP  Physical Medicine & Rehabilitation        60 minutes spent by me on the date of the encounter doing chart  review, history and exam, documentation and further activities per the note         Again, thank you for allowing me to participate in the care of your patient.      Sincerely,    JARVIS Cast CNP

## 2023-05-23 ENCOUNTER — HOSPITAL ENCOUNTER (OUTPATIENT)
Dept: MRI IMAGING | Facility: CLINIC | Age: 38
Discharge: HOME | End: 2023-05-23
Attending: NURSE PRACTITIONER | Admitting: NURSE PRACTITIONER
Payer: COMMERCIAL

## 2023-05-23 ENCOUNTER — THERAPY VISIT (OUTPATIENT)
Dept: PHYSICAL THERAPY | Facility: REHABILITATION | Age: 38
End: 2023-05-23
Attending: NURSE PRACTITIONER
Payer: COMMERCIAL

## 2023-05-23 DIAGNOSIS — R29.898 LEFT ARM WEAKNESS: ICD-10-CM

## 2023-05-23 DIAGNOSIS — M54.12 CERVICAL RADICULOPATHY: ICD-10-CM

## 2023-05-23 DIAGNOSIS — M54.9 UPPER BACK PAIN: ICD-10-CM

## 2023-05-23 DIAGNOSIS — M54.12 CERVICAL RADICULOPATHY: Primary | ICD-10-CM

## 2023-05-23 DIAGNOSIS — M54.2 NECK PAIN: ICD-10-CM

## 2023-05-23 PROCEDURE — 72141 MRI NECK SPINE W/O DYE: CPT

## 2023-05-23 PROCEDURE — 97161 PT EVAL LOW COMPLEX 20 MIN: CPT | Mod: GP | Performed by: PHYSICAL THERAPIST

## 2023-05-23 PROCEDURE — 72141 MRI NECK SPINE W/O DYE: CPT | Mod: 26 | Performed by: RADIOLOGY

## 2023-05-23 PROCEDURE — 97112 NEUROMUSCULAR REEDUCATION: CPT | Mod: GP | Performed by: PHYSICAL THERAPIST

## 2023-05-23 PROCEDURE — 97110 THERAPEUTIC EXERCISES: CPT | Mod: GP | Performed by: PHYSICAL THERAPIST

## 2023-05-23 NOTE — PROGRESS NOTES
PHYSICAL THERAPY EVALUATION  Type of Visit: Evaluation    See electronic medical record for Abuse and Falls Screening details.    Subjective      Presenting condition or subjective complaint: Neck pain, likely bulged disc - cannot move left triceps  Date of onset:    April 2023    Prior diagnostic imaging/testing results: MRI     Per chart review: IMPRESSION:  Multilevel cervical spondylosis, most significantly including severe  left neuroforaminal stenosis at C6-7 and moderate right neuroforaminal  stenosis at C5-6.  Prior therapy history for the same diagnosis, illness or injury: No        Living Environment  Social support: With family members   Type of home: House   Stairs to enter the home: Yes 3     Ramp: No   Stairs inside the home: Yes 30 Is there a railing: Yes   Help at home: None  Equipment owned:       Employment: Yes Professor  Hobbies/Interests: Basketball, lifting weights, stand up paddleboarding    Patient goals for therapy: Be able to tense my left triceps muscles     Pt presents with left cervical spine pain that radiates to his left upper trapezius. His symptoms initially started in the beginning of April 2023. Pt reports L UE weakness with lifting that started 4 days ago with bench press and specifically tricep extension. He had a lot of soreness in his triceps muscle after that.  He has started to feel numbness- mostly tingling/pins and needles on his left lateral arm and left thumb that is intermittent. He did feel his pain improved significantly after taking two rounds of oral steroids (first 5/8-5/13 and second: 5/15-5/20). He stopped taking the muscle relaxant as he did not feel it was helping.  Working on his laptop seems to aggravate it. Standing desk is better.      Objective   CERVICAL SPINE EVALUATION  PAIN: Pain Level at Rest: 0/10  Pain Level with Use: 2/10  Pain is Worst: 6-7/10  Pain is Exacerbated By: turning head, movement of neck  Pain is Relieved By: oral prednisone, resting,  "lying down    ROM:   (Degrees) Left AROM Right AROM    Cervical Flexion 40 deg with pain    Cervical Extension 26 deg    Cervical Side bend 20 deg with pain 30 deg    Cervical Rotation 65 deg 55 deg    Cervical Protrusion     Cervical Retraction     Thoracic Flexion     Thoracic Extension     Thoracic Rotation          MYOTOMES:    Left Right   C1-2 (Neck Flexion)     C3 (Neck Side Bend)      C4 (Shrug) 5 5   C5 (Deltoid) 5 5   C6 (Biceps) 5 5   C7 (Triceps) 4 5   C8 (Thumb Ext) 5 5   T1 (Intrinsics) 5 5     DERMATOMES:    Left Right   C1 Normal (light touch) Normal (light touch)   C2 Normal (light touch) Normal (light touch)   C3 Normal (light touch) Normal (light touch)   C4 Normal (light touch) Normal (light touch)   C5 Normal (light touch) Normal (light touch)   C6 Normal (light touch) Normal (light touch)   C7 Normal (light touch) Normal (light touch)   C8 Normal (light touch) Normal (light touch)   T1 Normal (light touch) Normal (light touch)       NEURAL TENSION:    Left Right   SLR     SLR with DF     Femoral Nerve     Slump     Tyler (Lumbar)     Tyler (Thoracic)     Tyler (Cervical)     Median Increased tension but not positive Negative    Ulnar     Radial          Special Tests:    Left Right   Alar Ligament    Cervical Flexion-Rotation     Cervical Rot/Lateral Flex     Compression     Distraction Positive Negative    Spurling s Negative  Negative    Thoracic Outlet Screen (Matteo, Adson)     Transverse Ligament     Vertebral Artery     Cotton Roll Test     Craniocervical Flexor Endurance Test     Mannheimer Test            PALPATION: Tender with hypertonicity along left upper trapezius with radiation of pain into lateral arm; very minimal \"soreness\" with palpation to left triceps muscle belly and into insertion        Assessment & Plan   CLINICAL IMPRESSIONS   Medical Diagnosis: Upper back pain  Cervical radiculopathy  Left arm weakness  Neck pain    Treatment Diagnosis: left cervical radiculopathy, left arm " weakness   Impression/Assessment: Patient is a 37 year old male with left cervical spine pain that radiates to his left shoulder/posterior scapula and L UE weakness complaints.  The following significant findings have been identified: Pain, Decreased ROM/flexibility and Decreased strength. These impairments interfere with their ability to perform work tasks, recreational activities and dependent cares as compared to previous level of function.     Clinical Decision Making (Complexity):   Clinical Presentation: Stable/Uncomplicated  Clinical Presentation Rationale: based on medical and personal factors listed in PT evaluation  Clinical Decision Making (Complexity): Low complexity    PLAN OF CARE  Treatment Interventions:  Modalities: Cryotherapy, E-stim, Mechanical Traction  Interventions: Manual Therapy, Neuromuscular Re-education, Therapeutic Activity, Therapeutic Exercise, Self-Care/Home Management    Long Term Goals     PT Goal 1  Goal Identifier: HEP  Goal Description: Pt will be independent in HEP to address impairments and improve funtion  Target Date: 07/04/23  PT Goal 2  Goal Identifier: Strength  Goal Description: Pt will increase L UE elbow extension strength to 5/5 for return to lifting, dependent care and PLOF  Target Date: 07/18/23  PT Goal 3  Goal Identifier: Work  Goal Description: Pt will be able to work at his computer throughout his day without pain for improvement in his quality of life  Target Date: 07/18/23      Frequency of Treatment: 1-2x/week  Duration of Treatment: 8 weeks      Education Assessment:   Learner/Method: Patient;Pictures/Video    Risks and benefits of evaluation/treatment have been explained.   Patient/Family/caregiver agrees with Plan of Care.     Evaluation Time:     PT Eval, Low Complexity Minutes (20418): 25      Signing Clinician: Yamilet Cheney, PT, DPT, JERRY

## 2023-05-25 ENCOUNTER — TELEPHONE (OUTPATIENT)
Dept: PHYSICAL MEDICINE AND REHAB | Facility: CLINIC | Age: 38
End: 2023-05-25

## 2023-05-25 ENCOUNTER — OFFICE VISIT (OUTPATIENT)
Dept: PHYSICAL MEDICINE AND REHAB | Facility: CLINIC | Age: 38
End: 2023-05-25
Payer: COMMERCIAL

## 2023-05-25 VITALS — OXYGEN SATURATION: 100 % | HEART RATE: 83 BPM | DIASTOLIC BLOOD PRESSURE: 87 MMHG | SYSTOLIC BLOOD PRESSURE: 133 MMHG

## 2023-05-25 DIAGNOSIS — R29.898 LEFT ARM WEAKNESS: ICD-10-CM

## 2023-05-25 DIAGNOSIS — M54.12 CERVICAL RADICULOPATHY: ICD-10-CM

## 2023-05-25 DIAGNOSIS — M54.2 NECK PAIN: ICD-10-CM

## 2023-05-25 DIAGNOSIS — M54.9 UPPER BACK PAIN: ICD-10-CM

## 2023-05-25 DIAGNOSIS — M54.12 CERVICAL RADICULOPATHY: Primary | ICD-10-CM

## 2023-05-25 DIAGNOSIS — M48.02 NEUROFORAMINAL STENOSIS OF CERVICAL SPINE: Primary | ICD-10-CM

## 2023-05-25 DIAGNOSIS — M48.02 NEUROFORAMINAL STENOSIS OF CERVICAL SPINE: ICD-10-CM

## 2023-05-25 PROCEDURE — 99215 OFFICE O/P EST HI 40 MIN: CPT | Performed by: NURSE PRACTITIONER

## 2023-05-25 RX ORDER — LIDOCAINE 40 MG/G
CREAM TOPICAL
Status: CANCELLED | OUTPATIENT
Start: 2023-05-25

## 2023-05-25 ASSESSMENT — PAIN SCALES - GENERAL: PAINLEVEL: NO PAIN (1)

## 2023-05-25 NOTE — TELEPHONE ENCOUNTER
Patient left a message with [facility] procedure scheduling stating that an order was placed for him to received a c7-t1 epidural injection and that he was told [facility] was the only location that can do the procedure. Writer unable to find active case request. If appropriate, please place request, otherwise advise patient.    Demetris Little on 5/25/2023 at 10:29 AM

## 2023-05-25 NOTE — PROGRESS NOTES
PM&R Follow-Up Visit -     Date of Initial Visit: 5/22/2023  LOV: 5/22/2023  TD: 5/25/2023     Recall: Justo Cardoza is a right-handed 37 year old male who presents with a chief complaint of cervical radiculopathy.     INTERVAL HISTORY:  Patient was last seen in clinic 5/22/2023. At that visit, the plan was to obtain x-ray and MRI of the cervical spine, begin physical therapy, and slowly titrate gabapentin as tolerated.     He had an MRI of the cervical spine 5/23/2023 which showed left neuroforaminal stenosis at C6-7.    Since last time he attended 1 session of physical therapy.  The therapist performed gentle traction which he says may have helped the pain a little.      He continues to have left-sided neck pain radiating down the back of his left arm.  In particular, the area from the back of his mid upper arm to the back of the forearm continues to feel painful.  He does have some pain around the left scapula.  He says there is a little less tingling around his left shoulder.  He still has some mild tingling in the left thumb.    He started taking gabapentin 300 mg at night and says he has been sleeping well.  He says he may add in the 300 mg dose in the morning as well, according to the dosing chart he was provided in our last visit.      Recall HISTORY OF PRESENT ILLNESS:  Justo Cardoza is a right-handed 37 year old male who presents with a chief complaint of cervical radiculopathy.     He was seen in the clinic today regarding neck pain with left upper extremity numbness and weakness that started in in mid to late March.  He initially noticed some neck pain and stiffness on the right side.  Within a week or so he noticed pain more on the left side.    He cannot recall any particular accident or inciting event, but does note traveling internationally for work in mid March, doing more work on his laptop, and paddle boarding.  He does report remotely having some neck pain after demonstrating a somersault for his  child in the past but says the pain resolved on its own after that incident.    Today he describes left-sided neck pain radiating down the back of his left arm.  The upper forearm is most bothersome, but at times the upper arm bothers him too.  He has some pain around the left scapula.  He notices numbness and tingling at the back of his left shoulder for the last 2 days.  He says he might have some mild tingling in the left thumb.    He was seen in the family medicine clinic 5/8/2023 and was prescribed 40 mg of prednisone x5 days from 5/8 to 5/13/2023.  He later received another short course of steroid- 20 mg of prednisone x5 days from 5/15 to 5/20/2023.    Last Thursday when he was at the gym he noticed weakness in the left arm.  He typically does a bench press exercise with free weights.  He was not able to lift as much with the left arm compared to the right.  He was also doing triceps extensions.  On the right arm he could do 22.5 pounds but on the left side he could only do 5 pounds.  He uses an delfino to record his strength workouts and says a couple weeks ago he was able to do that same workout without any difficulty.  Later he was playing basketball with friends and had trouble doing a left-handed layup.    He states that his pain is somewhat better than before but the weakness is bothering him.  Currently he rates the pain 1/10, sometimes increasing to 4/10.  The pain was improved with oral steroid and rest.    He denies any changes with his fine motor skills, dropping things, gait, stumbling or tripping, bowel or bladder incontinence, or saddle anesthesia.        PRIOR INJURIES/TREATMENT:   Ice/Heat: none, hot shower helps a little    Brace: none    Physical Therapy:   Prior PT - ankle injury, sciatica RLE     - Current Pain Medications -  Gabapentin 300 mg at night.  Has prescription to increase to 3 times a day as tolerated.  Tylenol as needed    - Prior/Trialed Pain Medications -   Prednisone, completed  two 5 day courses  Cyclobenzaprine   Ibuprofen / Naproxen        Prior Procedures:  Date    Procedure   Improvement (%)  none              Prior Related Surgery: none     Other (acupuncture, OMT, CMM, TENS, DME, etc.): none  Massage remotely    Specialists Seen - (with most recent, available notes and clinic visits reviewed)   1.  Sports Med -foot, hand, ankle pain  2.  Orthopedics- [place] left hand fracture    IMAGING - reviewed   MR CERVICAL SPINE W/O CONTRAST  5/23/2023   FINDINGS:  Normal cervical vertebral alignment. Normal marrow signal. Multilevel  mild disc space narrowing. No cord signal abnormality.     The findings on a level by level basis are as follows:     C2-3: Bilateral uncinate spurring and facet arthropathy. Mild right  neuroforaminal stenosis. No significant left neuroforaminal or spinal  canal stenosis.     C3-4: Bilateral uncinate spurring and facet arthropathy. Moderate  bilateral neuroforaminal stenosis. No spinal canal stenosis.     C4-5: Bilateral uncinate spurring and facet arthropathy. Mild  bilateral foraminal stenosis. No canal stenosis.     C5-6: Mild disc bulge. Mild bilateral uncinate spurring and facet  arthropathy. Moderate right neural foraminal stenosis. No significant  left neural foraminal or spinal canal stenosis.     C6-7: Mild disc bulge with superimposed left foraminal protrusion.  Bilateral uncinate spurring and facet arthropathy. Severe left  neuroforaminal stenosis. Mild right neural foraminal stenosis. No  spinal canal stenosis.     C7-T1: No spinal canal or neural foraminal stenosis.     The visualized skull base, posterior fossa, and paraspinal soft  tissues are within normal limits.                                                                      IMPRESSION:  Multilevel cervical spondylosis, most significantly including severe  left neuroforaminal stenosis at C6-7 and moderate right neuroforaminal  stenosis at C5-6.        Review Of Systems:  I am responding to  those symptoms which are directly relevant to the specific indication for my consultation. I recommend that the patient follow up with their primary or referring provider to pursue any other symptoms which may be of concern.       Medical History:  He  has a past medical history of Bipolar affective disorder in remission (H), Recurrent cold sores, and Varicella.     He  has a past surgical history that includes Abdomen surgery (2009); hernia repair, inguinal rt/lt (1995); and appendectomy (2000).    Family History  His family history includes Coronary Artery Disease in his paternal grandfather; Depression in his brother and father; Hypertension in his mother; Prostate Cancer in his paternal grandfather; Skin Cancer in his father and paternal grandfather.   Brother: Spine pain    Social History:  Work: professor of applied economics at [employer]  Current living situation: lives with his spouse and kids   He reports that he has never smoked. He has never used smokeless tobacco. He reports current alcohol use. He reports that he does not use drugs.        Current Medications:   He has a current medication list which includes the following prescription(s): prednisone, cyclobenzaprine, famotidine, multivitamin, therapeutic, naproxen, olopatadine, quetiapine, valacyclovir, and zolpidem.     Allergies:   No Known Allergies    PHYSICAL EXAMINATION:  /87 (BP Location: Right arm, Patient Position: Sitting, Cuff Size: Adult Large)   Pulse 83   SpO2 100%     --CONSTITUTIONAL: Vital signs as above. No acute distress. The patient is well nourished and well groomed.  --PSYCHIATRIC: The patient is awake, alert, oriented to person, place, time and answering questions appropriately with clear speech. Appropriate mood and affect   --SKIN: observable skin is clean, dry, intact without rashes.  --RESPIRATORY: Normal rhythm and effort. No abnormal accessory muscle breathing patterns noted.   --GROSS MOTOR: Easily arises from a  seated position. Gait is non-antalgic.  --CERVICAL SPINE: Inspection reveals no evidence of deformity. Range of motion is not limited in cervical flexion, extension, lateral rotation.  Mild pain elicited  looking over left shoulder. No tenderness to palpation cervical spine.  Spurling maneuver elicits neck/upper back pain bilaterally but no radicular symptoms.  --UPPER EXTREMITY MOTOR TESTING:  Wrist flexion left 5/5, right 5/5  Wrist extension left 5/5, right 5/5  Biceps left 5/5, right 5/5   Triceps left 4/5, right 5/5   Shoulder abduction left 5/5, right 5/5   left 5/5, right 5/5  Finger abduction left 4+/5, right 5/5  --NEUROLOGIC: CN III-XII are grossly intact.   2/4 symmetric biceps, brachioradialis, triceps reflexes bilaterally. Sensation to upper extremities is intact.  Negative Lorenzo's bilaterally.    --VASCULAR: Warm upper limbs bilaterally.        ASSESSMENT:  Justo Cardoza is a pleasant 37 year old male who presents with left-sided neck, upper back, and scapular pain with pain extending into the back of his left arm,  left upper extremity paresthesias, and left triceps weakness.    MRI of the cervical spine 5/23/2023 showed multilevel bilateral uncinate spurring and facet arthropathy, mild NFS at the right C2-3 and right C6-7, moderate NFS bilateral C3-4 C4-5 and right C5-6.  There is also severe left neuroforaminal stenosis at C6-7 which is the likely cause of his symptoms.    # neck pain  # left upper extremity weakness  # numbness - left upper extremity     PLAN:  -Imaging of the cervical spine reviewed in detail with the patient  -Given his left upper extremity weakness along with the pain will add C7-T1 IL [facility] with Dr. Fuchs  -Encouraged him to continue on with physical therapy and home exercise program  -Given the degree of his left tricep weakness (when at the gym he was able to do 22.5 pound tricep extensions on the right side but only 5 pounds on the left side) we will also asked that he  see spine surgery as he prefers to establish care.   -He is avoiding weight lifting and his more high impact activities (basketball) for the time being   -He is tolerating gabapentin 300 mg at bedtime.  He was provided a chart to slowly increase the medication as tolerated at the prior visit.  He says that he may add on the dose in the morning according to the dosing schedule.  - RTC for procedure with Dr. Fuchs    Ready to learn, no apparent learning barriers.  Education provided on treatment plan according to patient's preferred learning style.  Patient verbalizes understanding.   __________________________________  Shweta Rawls NP  Physical Medicine & Rehabilitation        60 minutes spent by me on the date of the encounter doing chart review, history and exam, documentation and further activities per the note

## 2023-05-25 NOTE — LETTER
5/25/2023       RE: Justo Cardoza  [address] [facility] [address]       Dear Colleague,    Thank you for referring your patient, Justo Cardoza, to the [facility] PHYSICAL MEDICINE AND REHABILITATION CLINIC [place] at [facility]. Please see a copy of my visit note below.    PM&R Follow-Up Visit -     Date of Initial Visit: 5/22/2023  LOV: 5/22/2023  TD: 5/25/2023     Recall: Justo Cardoza is a right-handed 37 year old male who presents with a chief complaint of cervical radiculopathy.     INTERVAL HISTORY:  Patient was last seen in clinic 5/22/2023. At that visit, the plan was to obtain x-ray and MRI of the cervical spine, begin physical therapy, and slowly titrate gabapentin as tolerated.     He had an MRI of the cervical spine 5/23/2023 which showed left neuroforaminal stenosis at C6-7.    Since last time he attended 1 session of physical therapy.  The therapist performed gentle traction which he says may have helped the pain a little.      He continues to have left-sided neck pain radiating down the back of his left arm.  In particular, the area from the back of his mid upper arm to the back of the forearm continues to feel painful.  He does have some pain around the left scapula.  He says there is a little less tingling around his left shoulder.  He still has some mild tingling in the left thumb.    He started taking gabapentin 300 mg at night and says he has been sleeping well.  He says he may add in the 300 mg dose in the morning as well, according to the dosing chart he was provided in our last visit.      Recall HISTORY OF PRESENT ILLNESS:  Justo Cardoza is a right-handed 37 year old male who presents with a chief complaint of cervical radiculopathy.     He was seen in the clinic today regarding neck pain with left upper extremity numbness and weakness that started in in mid to late March.  He initially noticed some neck pain and stiffness  on the right side.  Within a week or so he noticed pain more on the left side.    He cannot recall any particular accident or inciting event, but does note traveling internationally for work in mid March, doing more work on his laptop, and paddle boarding.  He does report remotely having some neck pain after demonstrating a somersault for his child in the past but says the pain resolved on its own after that incident.    Today he describes left-sided neck pain radiating down the back of his left arm.  The upper forearm is most bothersome, but at times the upper arm bothers him too.  He has some pain around the left scapula.  He notices numbness and tingling at the back of his left shoulder for the last 2 days.  He says he might have some mild tingling in the left thumb.    He was seen in the family medicine clinic 5/8/2023 and was prescribed 40 mg of prednisone x5 days from 5/8 to 5/13/2023.  He later received another short course of steroid- 20 mg of prednisone x5 days from 5/15 to 5/20/2023.    Last Thursday when he was at the gym he noticed weakness in the left arm.  He typically does a bench press exercise with free weights.  He was not able to lift as much with the left arm compared to the right.  He was also doing triceps extensions.  On the right arm he could do 22.5 pounds but on the left side he could only do 5 pounds.  He uses an delfino to record his strength workouts and says a couple weeks ago he was able to do that same workout without any difficulty.  Later he was playing basketball with friends and had trouble doing a left-handed layup.    He states that his pain is somewhat better than before but the weakness is bothering him.  Currently he rates the pain 1/10, sometimes increasing to 4/10.  The pain was improved with oral steroid and rest.    He denies any changes with his fine motor skills, dropping things, gait, stumbling or tripping, bowel or bladder incontinence, or saddle anesthesia.        PRIOR  INJURIES/TREATMENT:   Ice/Heat: none, hot shower helps a little    Brace: none    Physical Therapy:   Prior PT - ankle injury, sciatica RLE     - Current Pain Medications -  Gabapentin 300 mg at night.  Has prescription to increase to 3 times a day as tolerated.  Tylenol as needed    - Prior/Trialed Pain Medications -   Prednisone, completed two 5 day courses  Cyclobenzaprine   Ibuprofen / Naproxen        Prior Procedures:  Date    Procedure   Improvement (%)  none              Prior Related Surgery: none     Other (acupuncture, OMT, CMM, TENS, DME, etc.): none  Massage remotely    Specialists Seen - (with most recent, available notes and clinic visits reviewed)   1.  Sports Med -foot, hand, ankle pain  2.  Orthopedics- [place] left hand fracture    IMAGING - reviewed   MR CERVICAL SPINE W/O CONTRAST  5/23/2023   FINDINGS:  Normal cervical vertebral alignment. Normal marrow signal. Multilevel  mild disc space narrowing. No cord signal abnormality.     The findings on a level by level basis are as follows:     C2-3: Bilateral uncinate spurring and facet arthropathy. Mild right  neuroforaminal stenosis. No significant left neuroforaminal or spinal  canal stenosis.     C3-4: Bilateral uncinate spurring and facet arthropathy. Moderate  bilateral neuroforaminal stenosis. No spinal canal stenosis.     C4-5: Bilateral uncinate spurring and facet arthropathy. Mild  bilateral foraminal stenosis. No canal stenosis.     C5-6: Mild disc bulge. Mild bilateral uncinate spurring and facet  arthropathy. Moderate right neural foraminal stenosis. No significant  left neural foraminal or spinal canal stenosis.     C6-7: Mild disc bulge with superimposed left foraminal protrusion.  Bilateral uncinate spurring and facet arthropathy. Severe left  neuroforaminal stenosis. Mild right neural foraminal stenosis. No  spinal canal stenosis.     C7-T1: No spinal canal or neural foraminal stenosis.     The visualized skull base, posterior fossa,  and paraspinal soft  tissues are within normal limits.                                                                      IMPRESSION:  Multilevel cervical spondylosis, most significantly including severe  left neuroforaminal stenosis at C6-7 and moderate right neuroforaminal  stenosis at C5-6.        Review Of Systems:  I am responding to those symptoms which are directly relevant to the specific indication for my consultation. I recommend that the patient follow up with their primary or referring provider to pursue any other symptoms which may be of concern.       Medical History:  He  has a past medical history of Bipolar affective disorder in remission (H), Recurrent cold sores, and Varicella.     He  has a past surgical history that includes Abdomen surgery (2009); hernia repair, inguinal rt/lt (1995); and appendectomy (2000).    Family History  His family history includes Coronary Artery Disease in his paternal grandfather; Depression in his brother and father; Hypertension in his mother; Prostate Cancer in his paternal grandfather; Skin Cancer in his father and paternal grandfather.   Brother: Spine pain    Social History:  Work: professor of applied economics at [employer]  Current living situation: lives with his spouse and kids   He reports that he has never smoked. He has never used smokeless tobacco. He reports current alcohol use. He reports that he does not use drugs.        Current Medications:   He has a current medication list which includes the following prescription(s): prednisone, cyclobenzaprine, famotidine, multivitamin, therapeutic, naproxen, olopatadine, quetiapine, valacyclovir, and zolpidem.     Allergies:   No Known Allergies    PHYSICAL EXAMINATION:  /87 (BP Location: Right arm, Patient Position: Sitting, Cuff Size: Adult Large)   Pulse 83   SpO2 100%     --CONSTITUTIONAL: Vital signs as above. No acute distress. The patient is well nourished and well groomed.  --PSYCHIATRIC: The  patient is awake, alert, oriented to person, place, time and answering questions appropriately with clear speech. Appropriate mood and affect   --SKIN: observable skin is clean, dry, intact without rashes.  --RESPIRATORY: Normal rhythm and effort. No abnormal accessory muscle breathing patterns noted.   --GROSS MOTOR: Easily arises from a seated position. Gait is non-antalgic.  --CERVICAL SPINE: Inspection reveals no evidence of deformity. Range of motion is not limited in cervical flexion, extension, lateral rotation.  Mild pain elicited  looking over left shoulder. No tenderness to palpation cervical spine.  Spurling maneuver elicits neck/upper back pain bilaterally but no radicular symptoms.  --UPPER EXTREMITY MOTOR TESTING:  Wrist flexion left 5/5, right 5/5  Wrist extension left 5/5, right 5/5  Biceps left 5/5, right 5/5   Triceps left 4/5, right 5/5   Shoulder abduction left 5/5, right 5/5   left 5/5, right 5/5  Finger abduction left 4+/5, right 5/5  --NEUROLOGIC: CN III-XII are grossly intact.   2/4 symmetric biceps, brachioradialis, triceps reflexes bilaterally. Sensation to upper extremities is intact.  Negative Lorenzo's bilaterally.    --VASCULAR: Warm upper limbs bilaterally.        ASSESSMENT:  Justo Cardoza is a pleasant 37 year old male who presents with left-sided neck, upper back, and scapular pain with pain extending into the back of his left arm,  left upper extremity paresthesias, and left triceps weakness.    MRI of the cervical spine 5/23/2023 showed multilevel bilateral uncinate spurring and facet arthropathy, mild NFS at the right C2-3 and right C6-7, moderate NFS bilateral C3-4 C4-5 and right C5-6.  There is also severe left neuroforaminal stenosis at C6-7 which is the likely cause of his symptoms.    # neck pain  # left upper extremity weakness  # numbness - left upper extremity     PLAN:  -Imaging of the cervical spine reviewed in detail with the patient  -Given his left upper extremity  weakness along with the pain will add C7-T1 IL ALAINA with Dr. Fuchs  -Encouraged him to continue on with physical therapy and home exercise program  -Given the degree of his left tricep weakness (when at the gym he was able to do 22.5 pound tricep extensions on the right side but only 5 pounds on the left side) we will also asked that he see spine surgery as he prefers to establish care.   -He is avoiding weight lifting and his more high impact activities (basketball) for the time being   -He is tolerating gabapentin 300 mg at bedtime.  He was provided a chart to slowly increase the medication as tolerated at the prior visit.  He says that he may add on the dose in the morning according to the dosing schedule.  - RTC for procedure with Dr. Fuchs    Ready to learn, no apparent learning barriers.  Education provided on treatment plan according to patient's preferred learning style.  Patient verbalizes understanding.   __________________________________      60 minutes spent by me on the date of the encounter doing chart review, history and exam, documentation and further activities per the note         Again, thank you for allowing me to participate in the care of your patient.      Sincerely,    JARVIS Cast CNP

## 2023-05-25 NOTE — NURSING NOTE
Chief Complaint   Patient presents with     RECHECK     Pinch nerve in back per patient     Lin Butler CMA at 7:03 AM on 5/25/2023.

## 2023-05-25 NOTE — PATIENT INSTRUCTIONS
It was a pleasure seeing you in the clinic today.  As discussed, we made the following updates to your plan of care:     I entered the order for you to have a C7-T1 epidural steroid injection with Dr Fuchs at the [facility] location. You may also call [phone], Option 2 if you do not hear from the schedulers.    I will talk more with Dr Fuchs about the restrictions you asked about and get back to you with that information.      We will follow up 2 weeks after the injection to see how things are going after the procedure.       Thank you,  Shweta Rawls NP  Physical Medicine and Rehabilitation, Medical Spine  PM&R clinic Phone: [phone]  PM&R clinic Fax: [phone]          Preparing for Spine Injection Therapy              Why Do I Need Spine Injection Therapy?  Your Health Care Provider is recommending spine injection therapy to  help relieve your back and neck pain. This will be in addition to other therapies such as medications and physical therapy. The purpose of these injections is to reduce the amount of inflammation (swelling, pain, heat, redness, loss of body function) around the nerves thus reducing the amount of pain.     The medications you will receive with the injections will include:   Anesthetic - medication to numb the painful area.    Steroid - medication that prevents or reduces swelling and pain (anti-inflammatory).   To reduce your discomfort during the injection procedure, you will receive a numbing medication injection prior to the placement of the needles. You will be lying on your stomach during the injection procedure. We will use a low-dose x-ray (fluoroscopy) to help guide needle placement.  You must have a  for this procedure. We will need to reschedule your injection if you do not have a  with you.      What are the different types of injections and procedure?  Below, is a brief description of the different types of injections we use to deliver pain  medication as close as possible to the nerves in the painful area:    Epidural injection: (picture on the right) Epidural injections place 2 medications in your epidural space.  This is the space alongside your spinal canal (not inside of it). Nerves from your spinal cord pass through this space. The medications will bathe those nerves.       Facet joint injection: These injections place 2 medications into the joints of your neck or spine.   SI joint injection: (picture on the left) deliver pain medications into the Sacroiliac joint that connects the hip bones.   Hip joint injection: deliver pain medication to the joint that connect your hip and femur bones (the femur is the bone in the center of the leg that extends from the knee with the hip).  You will be lying on your side with your affected side up. For example, if we are injecting your left hip, then you will be lying on your right side.   Medial Branch Block injections: This is a test to see if your pain is coming from a specific nerve. This injection is similar to a facet joint injection but contains only the numbing medication.  You will keep a pain score diary for the rest of the day and the following morning after receiving the injection.    Radiofrequency ablation: This is a procedure that uses radio waves and numbing medication to block the nerves that feel pain at the joint. The pain relief effects can last for a long time, but are not permanent. The procedure is similar to the Medial Branch Block but requires additional testing to ensure that the needle is near the nerve before numbing and ablating it.  Doctors often order sedation for this procedure.  Please see the sections on sedation below.      What Should I Expect if I Receive Sedation? THIS IS ORDERED BY THE PHYSICIAN  This is conscious sedation.  The medications we give to you will help you relax and reduce your anxiety.  You will still be awake for the procedure so we can ask you questions  and hear your answers.     What Are My Responsibilities With Sedation?  You must stop eating and drinking 8 (eight) hours before your procedure. You can have clear fluids (water, coffee/tea without milk) up to 2 hours prior to the injections. Nothing by mouth for 2 hours prior to injection.  This includes gum, mints, or chew.  Take your morning medications with a small sip of water.    You must have a  who will check-in with you, and stay in the building while the procedure is underway.  If you do not have a  your sedation will be cancelled.  We will monitor you for at least 30 minutes after the procedure before being discharged home.      What Are the Risks and Complications For This Procedure?  Risks and complication are rare, but can still occur.  You should understand, discuss, and accept these risks before agreeing to the procedure. They include, but are not limited to:  infection  nerve damage   paralysis  injection failure or a need for further injections or additional procedures  continued or worsening of symptoms/pain,   medication reaction,  dural leak (into the hole covering around the spinal cord. This may cause a a spinal headache)  leak of the medication into the spinal canal, nerves, or blood vessel.  death       What do I need to do before the procedure?   If you do not follow these instructions your procedure may be cancelled.  Tell us if you are on major blood thinners such as Coumadin, Xarelto , Plavix, Eliquis , Pradaxa , or others.    Contact the doctor who prescribed your blood thinner to ask for permission to stop taking it before you have the injection.    Schedule your pain injection procedure after your doctor gave their permission.   We will notify you when to stop and re-start your blood thinner.  Tell us if you have any allergies to contrast dye.  If you do, we may give additional medications to take before the procedure.  Tell us if you are pregnant, or possibly pregnant.   If so, you cannot receive steroid medications or be exposed to fluoroscopic X-rays.  Tell us if you have been sick during the 10 days before the procedure. This includes:   colds   gastrointestinal illness or discomfort  dental sores,   skin infection, or any other type of infection.  Tell us if you have taken antibiotics during the 10 days prior to the procedure.  Do not drink alcohol the night before or on the day of the procedure.  You must shower the night before and on the day of your procedure.  Wear comfortable, clean clothing.  If you have an outside MRI (Magnetic Resonance Imaging photo), please bring it with you.    What Will Happen After the Procedure?  If you did not receive sedation we will monitor you for 15 minutes after the procedure. If you received sedation, we will monitor you for at least 30 minutes after the procedure     How soon can I expect pain relief?  You have received 2 types of medications with your injection:    Anesthetic - numbing medication which only acts for a few hours    Steroid which may take 3-14 days to be effective.   You can expect to feel your normal pain after the anesthetic wears off, until the steroid becomes effective.    How should I care for myself at home?  Get plenty of rest and avoid twisting, bending movements, heavy lifting, or strenuous activity for the first 24 hours. This will help the steroid be more effective. Medial Branch Block injections will have different discharge instructions.  Those will be discussed at that injection appointment.  Resume your pain medications  Apply ice packs (on for 20 minutes at a time), every 2-3 hours to your injection area for the first 2-3 days to help with pain control.    Avoid heat (pads or water bottles), which can cause the veins to open up, making the steroid less effective. You can use heat after 48 hours.  Take showers only for the first 48 hours.  No baths, hot tubs, swimming, or soaking for 48 hours to reduce the  risk of infection.     When should I call the doctor?  Call us if you have any of the following:   Fever more than 100.5 degrees Fahrenheit   Signs of infection   Severe headache   Severe back pain   Increased numbness or weakness in your legs or arms   Loss of bladder or bowel control  Nausea   Other concerns    What is the contact information?  During business hours Monday-Friday 8a-5p call [phone].   After business hours, on weekends and holidays call [phone] and ask for the PM&R doctor on call

## 2023-05-26 PROBLEM — M48.02 NEUROFORAMINAL STENOSIS OF CERVICAL SPINE: Status: ACTIVE | Noted: 2023-05-26

## 2023-05-26 PROBLEM — M54.9 UPPER BACK PAIN: Status: ACTIVE | Noted: 2023-05-26

## 2023-05-26 NOTE — TELEPHONE ENCOUNTER
Patient is scheduled with Dr. Fuchs   Spoke with: the patient   Date of Procedure: 06/05   Location: [facility]   Informed patient they will need an adult : yes   Additional comments: Patient would like procedure information sent via [employer] message, and he's also wondering about an out-of-pocket cost estimate should insurance not approve of the injection.      Patient is aware pre-op RN will call 2-3 days prior to procedure with arrival time and instructions     Demetris Little on 5/26/2023 at 3:23 PM

## 2023-05-31 ENCOUNTER — MYC MEDICAL ADVICE (OUTPATIENT)
Dept: PHYSICAL MEDICINE AND REHAB | Facility: CLINIC | Age: 38
End: 2023-05-31

## 2023-05-31 ENCOUNTER — THERAPY VISIT (OUTPATIENT)
Dept: PHYSICAL THERAPY | Facility: REHABILITATION | Age: 38
End: 2023-05-31
Payer: COMMERCIAL

## 2023-05-31 DIAGNOSIS — M54.2 NECK PAIN: Primary | ICD-10-CM

## 2023-05-31 DIAGNOSIS — M54.12 CERVICAL RADICULOPATHY: Primary | ICD-10-CM

## 2023-05-31 DIAGNOSIS — M54.2 NECK PAIN: ICD-10-CM

## 2023-05-31 DIAGNOSIS — R29.898 LEFT ARM WEAKNESS: ICD-10-CM

## 2023-05-31 PROCEDURE — 97110 THERAPEUTIC EXERCISES: CPT | Mod: GP

## 2023-05-31 PROCEDURE — 97140 MANUAL THERAPY 1/> REGIONS: CPT | Mod: GP

## 2023-05-31 NOTE — TELEPHONE ENCOUNTER
Attempted to call patient back to clarify what information he was needing sent to him, no answer. He did have procedure information included in his after visit summary by Shweta Rawls NP. Will forward that information to patient via My Chart and ask if he has further questions and would like to connect via phone, also will advise [facility] nurse will call to review the [facility] guidelines for prep and arrival information as well 1-2 business days before his procedure.     TAPAN MckeonN RN  RN Care Coordinator for Physical Medicine and Rehabilitation  [facility] [address] [address]  Office: [phone]  Fax: [phone]

## 2023-06-05 ENCOUNTER — HOSPITAL ENCOUNTER (OUTPATIENT)
Facility: AMBULATORY SURGERY CENTER | Age: 38
Discharge: HOME | End: 2023-06-05
Attending: PHYSICAL MEDICINE & REHABILITATION | Admitting: PHYSICAL MEDICINE & REHABILITATION
Payer: COMMERCIAL

## 2023-06-05 ENCOUNTER — ANCILLARY PROCEDURE (OUTPATIENT)
Dept: RADIOLOGY | Facility: AMBULATORY SURGERY CENTER | Age: 38
End: 2023-06-05
Attending: PHYSICAL MEDICINE & REHABILITATION
Payer: COMMERCIAL

## 2023-06-05 VITALS
RESPIRATION RATE: 16 BRPM | SYSTOLIC BLOOD PRESSURE: 114 MMHG | TEMPERATURE: 97.3 F | DIASTOLIC BLOOD PRESSURE: 74 MMHG | WEIGHT: 170 LBS | OXYGEN SATURATION: 100 % | HEIGHT: 70 IN | BODY MASS INDEX: 24.34 KG/M2 | HEART RATE: 71 BPM

## 2023-06-05 DIAGNOSIS — M48.02 NEUROFORAMINAL STENOSIS OF CERVICAL SPINE: ICD-10-CM

## 2023-06-05 DIAGNOSIS — M54.12 CERVICAL RADICULOPATHY: Primary | ICD-10-CM

## 2023-06-05 DIAGNOSIS — R29.898 LEFT ARM WEAKNESS: ICD-10-CM

## 2023-06-05 DIAGNOSIS — M54.9 UPPER BACK PAIN: ICD-10-CM

## 2023-06-05 DIAGNOSIS — M54.12 CERVICAL RADICULOPATHY: ICD-10-CM

## 2023-06-05 DIAGNOSIS — M54.2 NECK PAIN: ICD-10-CM

## 2023-06-05 PROCEDURE — 62321 NJX INTERLAMINAR CRV/THRC: CPT | Performed by: PHYSICAL MEDICINE & REHABILITATION

## 2023-06-05 PROCEDURE — 62321 NJX INTERLAMINAR CRV/THRC: CPT

## 2023-06-05 RX ORDER — LIDOCAINE 40 MG/G
CREAM TOPICAL
Status: DISCONTINUED | OUTPATIENT
Start: 2023-06-05 | End: 2023-06-06 | Stop reason: HOSPADM

## 2023-06-05 RX ORDER — IOPAMIDOL 408 MG/ML
INJECTION, SOLUTION INTRATHECAL PRN
Status: DISCONTINUED | OUTPATIENT
Start: 2023-06-05 | End: 2023-06-05 | Stop reason: HOSPADM

## 2023-06-05 RX ORDER — LIDOCAINE HYDROCHLORIDE 10 MG/ML
INJECTION, SOLUTION EPIDURAL; INFILTRATION; INTRACAUDAL; PERINEURAL PRN
Status: DISCONTINUED | OUTPATIENT
Start: 2023-06-05 | End: 2023-06-05 | Stop reason: HOSPADM

## 2023-06-05 RX ORDER — DEXAMETHASONE SODIUM PHOSPHATE 10 MG/ML
INJECTION INTRAMUSCULAR; INTRAVENOUS PRN
Status: DISCONTINUED | OUTPATIENT
Start: 2023-06-05 | End: 2023-06-05 | Stop reason: HOSPADM

## 2023-06-05 NOTE — OP NOTE
PROCEDURE NOTE: Cervical Interlaminar Epidural Steroid Injection    PROCEDURE DATE: 2023    PATIENT NAME: Justo Cardoza  YOB: 1985    ATTENDING PHYSICIAN: Jayesh Fuchs MD    PREOPERATIVE DIAGNOSIS: Cervical radicular pain  POSTOPERATIVE DIAGNOSIS: Same    PROCEDURE PERFORMED: Interlaminar Epidural Steroid Injection at the C7-T1 level, with a Left paramedian approach under fluoroscopic guidance    ESTIMATED BLOOD LOSS:  None    FLUOROSCOPY WAS USED.    INDICATIONS FOR PROCEDURE:   Justo Cardoza is a 37 year old male with a clinical picture consistent with the above-mentioned diagnosis, resulting in radicular pain to the left upper extremity.    PROCEDURE AND FINDINGS:   Justo Cardoza was greeted in the pre-procedure holding area. The risk, benefits and alternatives to the procedure were again reviewed with the patient and written informed consent was placed in the chart. An IV line was placed.  A 500 mL bag of NS was not connected to the patient. He was taken to the procedure room and positioned prone on the fluoroscopy table.  Routine monitors were applied including blood pressure cuff, and pulse oximetry. Prior to the procedure a time out was completed, verifying correct patient, procedure, site, positioning, and implants and/or special equipment.     An AP fluoroscpic  film was taken to identify the C7 and T1 vertebral bodies, as well as the C7-T1 interspace. The skin was prepped with chlorhexidine and draped in the usual sterile fashion. The skin and subcutaneous tissue overlying the C7-T1 interspace was anesthetized using a 27-guage 1-1/4 inch needle with 1% preservative-free lidocaine for a total volume of 3 mls. Then an 20 cm Tuohy needle was advanced utilizing AP, contralateral oblique (45*), and lateral fluoroscopic views into the C7-T1 interlaminar space. Once the needle tip was engaged in the ligamentum flavum, a loss of resistance syringe was attached and loss of resistance to  saline.     After negative aspiration, 1mls of Isovue-M 200 contrast was injected under AP view with live fluoroscopy and confirmed adequate spread along the epidural space. There was no evidence of intravascular uptake or intrathecal spread on imaging. Contralateral oblique/lateral view(s) were also taken confirming adequate epidural spread.     At this point, after negative aspiration, a total 4mL volume of treatment injectate, consisting of 1mL of 10mg/mL dexamethasone, 3mL of PFNS was injected easily.  The needle was then flushed with 0.3 ml of PFNS, restyletted  and removed. The needle insertion site was dressed appropriately.     Justo was taken to the recovery room where he was monitored for a brief period of time. He tolerated the procedure well and was discharged home in stable condition with post procedural instructions.     Follow-up will be in PM&R Spine Clinic with Shweta Rawls NP     COMPLICATIONS: None    COMMENTS: None       1.59

## 2023-06-05 NOTE — DISCHARGE INSTRUCTIONS
Home Care Instructions after an Epidural Steroid Pain Injection    A lumbar epidural steroid injection delivers steroid medication directly into the area that may be causing your lower back pain and/or leg pain. A cervical or thoracic epidural steroid injection delivers steroids into the epidural space surrounding spinal nerve roots to help relieve pain in the upper spine/neck.    Activity  -Rest today  -Do not work today  -Resume normal activity tomorrow  -DO NOT shower for 24 hours  -DO NOT remove bandaid for 24 hours    Pain  -You may experience soreness at the injection site for one or two days  -You may use an ice pack for 20 minutes every 2 hours for the first 24 hours  -You may use a heating pad after the first 24 hours  -You may use Tylenol (acetaminophen) every 4 hours or other pain medicines as     directed by your physician    You may experience numbness radiating into your legs or arms (depending on the procedure location). This numbness may last several hours. Until sensation returns to normal; please use caution in walking, climbing stairs, and stepping out of your vehicle, etc.          Common side effects of steroids:  Not everyone will experience corticosteroid side effects. If side effects are experienced, they will gradually subside in the 7-10 day period following an injection. Most common side effects include:  -Flushed face and/or chest  -Feeling of warmth, particularly in the face but could be an overall feeling of warmth    -Sleep disturbances and/or mood swings are possible  -Leg cramps    Please contact us if you have:  -Severe pain  -Fever more than 101.5 degrees Fahrenheit  -Signs of infection at the injection site (redness, swelling, or drainage)    FOR PAIN CENTER PATIENTS:  If you have questions, please contact the Pain Clinic at [phone] Option #1 between the hours of 7:00 am and 3:00 pm Monday through Friday. After office hours you can contact the on call provider by dialing  [phone]. If you need immediate attention, we recommend that you go to a hospital emergency room or dial 911.      FOR PM&R PATIENTS:  For patients seen by the PM and R service, please call [phone]. (Monday through Friday 8a-5p.  After business hours and weekends call [phone] and ask for the PM and R doctor on call). If you need to fax a pain diary to PM&R the fax number is [phone]. If you are unable to fax, uploading to [employer] is encouraged, then send to provider. If you have procedure scheduling questions please call [phone] Option #2.

## 2023-06-08 ENCOUNTER — THERAPY VISIT (OUTPATIENT)
Dept: PHYSICAL THERAPY | Facility: REHABILITATION | Age: 38
End: 2023-06-08
Attending: NURSE PRACTITIONER
Payer: COMMERCIAL

## 2023-06-08 DIAGNOSIS — R29.898 LEFT ARM WEAKNESS: ICD-10-CM

## 2023-06-08 DIAGNOSIS — M54.2 NECK PAIN: ICD-10-CM

## 2023-06-08 DIAGNOSIS — M54.12 CERVICAL RADICULOPATHY: Primary | ICD-10-CM

## 2023-06-08 PROCEDURE — 97110 THERAPEUTIC EXERCISES: CPT | Mod: GP

## 2023-06-08 PROCEDURE — 97140 MANUAL THERAPY 1/> REGIONS: CPT | Mod: GP

## 2023-06-27 ENCOUNTER — THERAPY VISIT (OUTPATIENT)
Dept: PHYSICAL THERAPY | Facility: REHABILITATION | Age: 38
End: 2023-06-27
Payer: COMMERCIAL

## 2023-06-27 DIAGNOSIS — M54.12 CERVICAL RADICULOPATHY: Primary | ICD-10-CM

## 2023-06-27 DIAGNOSIS — R29.898 LEFT ARM WEAKNESS: ICD-10-CM

## 2023-06-27 DIAGNOSIS — M54.2 NECK PAIN: ICD-10-CM

## 2023-06-27 PROCEDURE — 97110 THERAPEUTIC EXERCISES: CPT | Mod: GP | Performed by: PHYSICAL THERAPIST

## 2023-06-28 ENCOUNTER — TELEPHONE (OUTPATIENT)
Dept: PHYSICAL MEDICINE AND REHAB | Facility: CLINIC | Age: 38
End: 2023-06-28
Payer: COMMERCIAL

## 2023-06-28 NOTE — TELEPHONE ENCOUNTER
----- Message from JARVIS Cast CNP sent at 6/27/2023 10:35 PM CDT -----  Hi,     Could you please check in with Justo to see if he would like to schedule a follow up with me?   (He recently had an epidural steroid injection with Dr Fuchs.)    Thanks!  Shweta

## 2023-07-01 ENCOUNTER — TELEPHONE (OUTPATIENT)
Dept: FAMILY MEDICINE | Facility: CLINIC | Age: 38
End: 2023-07-01
Payer: COMMERCIAL

## 2023-07-02 DIAGNOSIS — B00.1 RECURRENT COLD SORES: ICD-10-CM

## 2023-07-02 RX ORDER — VALACYCLOVIR HYDROCHLORIDE 1 G/1
TABLET, FILM COATED ORAL
Qty: 12 TABLET | Refills: 4 | Status: SHIPPED | OUTPATIENT
Start: 2023-07-02 | End: 2024-08-26

## 2023-07-06 NOTE — TELEPHONE ENCOUNTER
DIAGNOSIS: Left C6-7 severe neuroforaminal stenosis with left triceps weakness.  Neuroforaminal stenosis of cervical spine [M48.02]  - Primary       Left arm weakness [R29.898]       Cervical radiculopathy [M54.12]       Neck pain [M54.2]       Upper back pain [M54.9]          APPOINTMENT DATE: 07/11/2023   NOTES STATUS DETAILS   OFFICE NOTE from referring provider Internal 05/11/2023 - Shweta Rawls [facility] Physical Med   OFFICE NOTE from other specialist Internal [facility] PAIN  [facility] PHYSICAL THERAPY   MEDICATION LIST Internal    EMG (for Spine) Internal 06/05/2023   LABS     MRI Internal    XRAYS (IMAGES & REPORTS) Internal

## 2023-07-07 DIAGNOSIS — M54.2 NECK PAIN: Primary | ICD-10-CM

## 2023-07-11 ENCOUNTER — PRE VISIT (OUTPATIENT)
Dept: ORTHOPEDICS | Facility: CLINIC | Age: 38
End: 2023-07-11

## 2023-07-11 ENCOUNTER — ANCILLARY PROCEDURE (OUTPATIENT)
Dept: GENERAL RADIOLOGY | Facility: CLINIC | Age: 38
End: 2023-07-11
Attending: ORTHOPAEDIC SURGERY
Payer: COMMERCIAL

## 2023-07-11 ENCOUNTER — OFFICE VISIT (OUTPATIENT)
Dept: ORTHOPEDICS | Facility: CLINIC | Age: 38
End: 2023-07-11
Attending: NURSE PRACTITIONER
Payer: COMMERCIAL

## 2023-07-11 DIAGNOSIS — M54.2 NECK PAIN: ICD-10-CM

## 2023-07-11 DIAGNOSIS — R29.898 LEFT ARM WEAKNESS: ICD-10-CM

## 2023-07-11 DIAGNOSIS — M54.12 CERVICAL RADICULOPATHY: ICD-10-CM

## 2023-07-11 DIAGNOSIS — M48.02 NEUROFORAMINAL STENOSIS OF CERVICAL SPINE: ICD-10-CM

## 2023-07-11 DIAGNOSIS — M54.9 UPPER BACK PAIN: ICD-10-CM

## 2023-07-11 PROCEDURE — 99204 OFFICE O/P NEW MOD 45 MIN: CPT | Mod: GC | Performed by: ORTHOPAEDIC SURGERY

## 2023-07-11 PROCEDURE — 72040 X-RAY EXAM NECK SPINE 2-3 VW: CPT | Performed by: RADIOLOGY

## 2023-07-11 NOTE — NURSING NOTE
Reason For Visit:   Chief Complaint   Patient presents with     Consult     New patient consult for cervical spine.  C/O Left lower cervical pain, left UE radiating pain, tingling, weakness.  Has had ALAINA and PT.       Primary MD: Mark Anthony Blue  Ref. MD: Shweta Rawls, APRN, CNP    ?  No  Occupation Professor at [employer].  Currently working? Yes.  Work status?  Full time.  Date of injury: March/April 2023  Type of injury: Unsure.  Date of surgery: NA  Type of surgery: NA.  Smoker: No  Request smoking cessation information: No    There were no vitals taken for this visit.    Pain Assessment  Patient Currently in Pain: Yes  0-10 Pain Scale: 2 (no pain at rest)  Primary Pain Location: Neck    Oswestry (EMILY) Questionnaire        7/6/2023    12:49 AM   OSWESTRY DISABILITY INDEX   Count 10   Sum 7   Oswestry Score (%) 14 %            Neck Disability Index (NDI) Questionnaire        7/6/2023    12:50 AM   Neck Disability Index (NDI)   Neck Disability Index: Count 10   NDI: Total Score = SUM (points for all 10 findings) 10   Neck Disability in Percent = (Total Score) / 50 * 100 20 (%)                   Promis 10 Assessment        7/6/2023    12:49 AM   PROMIS 10   In general, would you say your health is: Very good   In general, would you say your quality of life is: Good   In general, how would you rate your physical health? Very good   In general, how would you rate your mental health, including your mood and your ability to think? Excellent   In general, how would you rate your satisfaction with your social activities and relationships? Excellent   In general, please rate how well you carry out your usual social activities and roles Excellent   To what extent are you able to carry out your everyday physical activities such as walking, climbing stairs, carrying groceries, or moving a chair? Moderately   In the past 7 days, how often have you been bothered by emotional problems such as feeling anxious,  depressed, or irritable? Never   In the past 7 days, how would you rate your fatigue on average? None   In the past 7 days, how would you rate your pain on average, where 0 means no pain, and 10 means worst imaginable pain? 2   In general, would you say your health is: 4   In general, would you say your quality of life is: 3   In general, how would you rate your physical health? 4   In general, how would you rate your mental health, including your mood and your ability to think? 5   In general, how would you rate your satisfaction with your social activities and relationships? 5   In general, please rate how well you carry out your usual social activities and roles. (This includes activities at home, at work and in your community, and responsibilities as a parent, child, spouse, employee, friend, etc.) 5   To what extent are you able to carry out your everyday physical activities such as walking, climbing stairs, carrying groceries, or moving a chair? 3   In the past 7 days, how often have you been bothered by emotional problems such as feeling anxious, depressed, or irritable? 1   In the past 7 days, how would you rate your fatigue on average? 1   In the past 7 days, how would you rate your pain on average, where 0 means no pain, and 10 means worst imaginable pain? 2   Global Mental Health Score 18   Global Physical Health Score 16   PROMIS TOTAL - SUBSCORES 34                Stone Schmitt ATC

## 2023-07-11 NOTE — LETTER
7/11/2023         RE: Justo Cardoza  [address] [address]        Dear Colleague,    Thank you for referring your patient, Justo Cardoza, to the [facility] [place]. Please see a copy of my visit note below.    Spine Surgery Consultation    REFERRING PHYSICIAN: Shweta Rawls   PRIMARY CARE PHYSICIAN: Mark Anthony Blue           Chief Complaint:   Consult (New patient consult for cervical spine.  C/O Left lower cervical pain, left UE radiating pain, tingling, weakness.  Sx ~ 3-4 months. Has had ALAINA and PT.)      History of Present Illness:  Symptom Profile Including: location of symptoms, onset, severity, exacerbating/alleviating factors, previous treatments:        Justo Cardoza is a 37 year old male who presents today with neck pain that started about 2 months ago.  She states that the pain started on his right side and then migrated to his left and started radiating down his left arm.  He states that he started having loss of strength specifically in his left triceps.  He also states that he started having tingling in his hands specifically of his thumbs.  He states that the pain would radiate down his posterior upper arm but not past his elbow.  He states that he started physical therapy, taking ibuprofen and gabapentin, and tried steroid injections.  He states that these conservative therapies have led to incredible relief of his symptoms.  He states that today he does not have pain, weakness, numbness or tingling unless exacerbated by certain movements such as extending his neck.         Past Medical History:     Past Medical History:   Diagnosis Date    Bipolar affective disorder in remission (H)     Recurrent cold sores     Varicella             Past Surgical History:     Past Surgical History:   Procedure Laterality Date    APPENDECTOMY  2000    GENITOURINARY SURGERY  2009    Hydrocelectomy    HERNIA REPAIR, INGUINAL RT/LT  1995    INJECT EPIDURAL CERVICAL  N/A 6/5/2023    Procedure: C7-T1 interlaminar epidural steroid injection;  Surgeon: Jayesh Fuchs MD;  Location: [facility] [place]            Social History:     Social History     Tobacco Use    Smoking status: Never    Smokeless tobacco: Never   Substance Use Topics    Alcohol use: Yes     Types: 4 Standard drinks or equivalent per week     Comment: 2-3 days a week            Family History:     Family History   Problem Relation Age of Onset    Hypertension Mother     Skin Cancer Father     Depression Father     Depression Brother     Skin Cancer Paternal Grandfather     Prostate Cancer Paternal Grandfather     Coronary Artery Disease Paternal Grandfather         bypass    Glaucoma No family hx of     Macular Degeneration No family hx of     Colorectal Cancer No family hx of             Allergies:   No Known Allergies         Medications:     Current Outpatient Medications   Medication    famotidine (PEPCID) 10 MG tablet    gabapentin (NEURONTIN) 300 MG capsule    melatonin 3 MG CAPS    multivitamin, therapeutic (THERA-VIT) TABS tablet    olopatadine (PATADAY) 0.2 % ophthalmic solution    QUEtiapine (SEROQUEL) 50 MG tablet    valACYclovir (VALTREX) 1000 mg tablet    zolpidem (AMBIEN) 10 MG tablet     No current facility-administered medications for this visit.             Review of Systems:     A 10 point ROS was performed and reviewed. Specific responses to these questions are noted at the end of the document.         Physical Exam:   Vitals: There were no vitals taken for this visit.  Constitutional: awake, alert, cooperative, no apparent distress, appears stated age.    Eyes: The sclera are white.  Ears, Nose, Throat: The trachea is midline.  Psychiatric: The patient has a normal affect.  Respiratory: breathing non-labored  Cardiovascular: The extremities are warm and perfused.  Skin: no obvious rashes or lesions.  Musculoskeletal, Neurologic, and Spine:   Cervical spine:    Appearance -no gross step-offs,  kyphosis.    Motor -     C5: Deltoids R 5/5 and L 5/5 strength    C6: Biceps R 5/5 and L 5/5 strength     C7: Triceps R 5/5 and L 5/5 strength     C8:  R 5/5 and L 5/5 strength     T1: Dorsal interossei R 5/5 and L 5/5 strength        Sensation: intact to light touch in C5-T1      Special Tests -      Spurling's Test - Negative        Lumbar Spine:    Motor -     L2-3: Hip flexion R 5/5  And L 5/5 strength          L3/4:  Knee extension R 5/5 and L 5/5 strength         L4/5:  Foot dorsiflexion R 5/5 L 5/5 and       EHL dorsiflexion R 5/5 L 5/5 strength         S1:  Plantarflexion/Peroneal Muscles  R 5/5 and L 5/5 strength    Sensation: intact to light touch L3-S1 distribution BLE      Neurologic:      REFLEXES Right Left   Biceps 1+ 1+   Triceps 1+ 1+   Patella 1+ 1+   Ankle jerk 1+ 1+   Babinski No upgoing great toe No upgoing great toe   Clonus 0 beats 0 beats            Imaging:   We ordered and independently reviewed new radiographs at this clinic visit. The results were discussed with the patient.  Findings include:    C-Spine XRs today show multilevel spondylolisthesis with minimal grade 1 retrolisthesis of C2 on C3 with extension, which reduces on flexion.    Compared to 05/22/2023 C-Spine XRs which show similar findings without progression    Reviewed 05/23/2023 C-Spine MRI which also show multilevel cervical spondylosis, with left neuroforaminal stenosis at C6-7 and moderate right neuroforaminal  stenosis at C5-6.             Assessment and Plan:   Assessment:  37 year old male with neck pain and arm weakness consistent with cervical radiculopathy.  Currently he is trying many different conservative management options with significant relief of his symptoms.  He is here to discuss operative interventions if needed.  We discussed possible cervical disc arthroplasty if conservative options fail in the future.     Risks of this surgery include risk of infection, risk of dural tear resulting in CSF leak  which might result in headaches, or possible need for lumbar drain, or possible revision surgery in the setting of a persistent leak. Risk of hematoma or seroma resulting in wound complications.  Possible nerve root injury resulting in numbness weakness or paralysis into the arms or legs. Possible radiculitis which could result in similar symptoms or could result in significant neurogenic type pain. There is also a risk of delayed onset nerve root pals or radiculitis, which I explained is potentially due to stretch injury or possibly due to delayed onset swelling, and is sometimes temporary but can also be permanent.  Risk of incomplete decompression which might require revision surgery in the future.  Risk of adjacent segment problems requiring surgery in the future. Risk of incomplete relief of symptoms possibly requiring revision surgery in the future. Furthermore, although rare, there are risks of major vessel injury such as to the major vessels anteriorly or to the bowel from the surgery.  Sometimes this can happen if an instrument is passed anteriorly through the disc space.  I do use imaging to help guide the placement of the instrumentation, but even with this there is a chance of implant malposition or problem.  There is a risk of blood clots in the legs or the lungs.  Lastly, although rare, there are certainly risks of the anesthetic including stroke heart attack and death.      We recommended that the patient continues physical therapy, ibuprofen, gabapentin, and steroid injections as needed for relief of his symptoms.  We encouraged the patient to follow-up as needed to further discuss operative interventions if symptoms become debilitating and significantly affect his quality of life.      Baltazar Pace MD  Orthopedic Surgery PGY1  [phone]    Attending MD (Dr. Tano Edgar) :  I reviewed and verified the history and physical exam of the patient and discussed the patient's management with  the other clinical providers involved in this patient's care including any involved residents or physicians assistants. I reviewed the above note and agree with the documented findings and plan of care, which were communicated to the patient.      Tano Edgar MD  Respectfully,  Tano Edgar MD  Spine Surgery  [facility]

## 2023-07-12 NOTE — PROGRESS NOTES
Spine Surgery Consultation    REFERRING PHYSICIAN: Shweta Rawls   PRIMARY CARE PHYSICIAN: Mark Anthony Blue           Chief Complaint:   Consult (New patient consult for cervical spine.  C/O Left lower cervical pain, left UE radiating pain, tingling, weakness.  Sx ~ 3-4 months. Has had ALAINA and PT.)      History of Present Illness:  Symptom Profile Including: location of symptoms, onset, severity, exacerbating/alleviating factors, previous treatments:        Justo Cardoza is a 37 year old male who presents today with neck pain that started about 2 months ago.  She states that the pain started on his right side and then migrated to his left and started radiating down his left arm.  He states that he started having loss of strength specifically in his left triceps.  He also states that he started having tingling in his hands specifically of his thumbs.  He states that the pain would radiate down his posterior upper arm but not past his elbow.  He states that he started physical therapy, taking ibuprofen and gabapentin, and tried steroid injections.  He states that these conservative therapies have led to incredible relief of his symptoms.  He states that today he does not have pain, weakness, numbness or tingling unless exacerbated by certain movements such as extending his neck.         Past Medical History:     Past Medical History:   Diagnosis Date     Bipolar affective disorder in remission (H)      Recurrent cold sores      Varicella             Past Surgical History:     Past Surgical History:   Procedure Laterality Date     APPENDECTOMY  2000     GENITOURINARY SURGERY  2009    Hydrocelectomy     HERNIA REPAIR, INGUINAL RT/LT  1995     INJECT EPIDURAL CERVICAL N/A 6/5/2023    Procedure: C7-T1 interlaminar epidural steroid injection;  Surgeon: Jayesh Fuchs MD;  Location: [facility] OR            Social History:     Social History     Tobacco Use     Smoking status: Never     Smokeless tobacco: Never   Substance Use  Topics     Alcohol use: Yes     Types: 4 Standard drinks or equivalent per week     Comment: 2-3 days a week            Family History:     Family History   Problem Relation Age of Onset     Hypertension Mother      Skin Cancer Father      Depression Father      Depression Brother      Skin Cancer Paternal Grandfather      Prostate Cancer Paternal Grandfather      Coronary Artery Disease Paternal Grandfather         bypass     Glaucoma No family hx of      Macular Degeneration No family hx of      Colorectal Cancer No family hx of             Allergies:   No Known Allergies         Medications:     Current Outpatient Medications   Medication     famotidine (PEPCID) 10 MG tablet     gabapentin (NEURONTIN) 300 MG capsule     melatonin 3 MG CAPS     multivitamin, therapeutic (THERA-VIT) TABS tablet     olopatadine (PATADAY) 0.2 % ophthalmic solution     QUEtiapine (SEROQUEL) 50 MG tablet     valACYclovir (VALTREX) 1000 mg tablet     zolpidem (AMBIEN) 10 MG tablet     No current facility-administered medications for this visit.             Review of Systems:     A 10 point ROS was performed and reviewed. Specific responses to these questions are noted at the end of the document.         Physical Exam:   Vitals: There were no vitals taken for this visit.  Constitutional: awake, alert, cooperative, no apparent distress, appears stated age.    Eyes: The sclera are white.  Ears, Nose, Throat: The trachea is midline.  Psychiatric: The patient has a normal affect.  Respiratory: breathing non-labored  Cardiovascular: The extremities are warm and perfused.  Skin: no obvious rashes or lesions.  Musculoskeletal, Neurologic, and Spine:   Cervical spine:    Appearance -no gross step-offs, kyphosis.    Motor -     C5: Deltoids R 5/5 and L 5/5 strength    C6: Biceps R 5/5 and L 5/5 strength     C7: Triceps R 5/5 and L 5/5 strength     C8:  R 5/5 and L 5/5 strength     T1: Dorsal interossei R 5/5 and L 5/5 strength         Sensation: intact to light touch in C5-T1      Special Tests -      Spurling's Test - Negative        Lumbar Spine:    Motor -     L2-3: Hip flexion R 5/5  And L 5/5 strength          L3/4:  Knee extension R 5/5 and L 5/5 strength         L4/5:  Foot dorsiflexion R 5/5 L 5/5 and       EHL dorsiflexion R 5/5 L 5/5 strength         S1:  Plantarflexion/Peroneal Muscles  R 5/5 and L 5/5 strength    Sensation: intact to light touch L3-S1 distribution BLE      Neurologic:      REFLEXES Right Left   Biceps 1+ 1+   Triceps 1+ 1+   Patella 1+ 1+   Ankle jerk 1+ 1+   Babinski No upgoing great toe No upgoing great toe   Clonus 0 beats 0 beats            Imaging:   We ordered and independently reviewed new radiographs at this clinic visit. The results were discussed with the patient.  Findings include:    C-Spine XRs today show multilevel spondylolisthesis with minimal grade 1 retrolisthesis of C2 on C3 with extension, which reduces on flexion.    Compared to 05/22/2023 C-Spine XRs which show similar findings without progression    Reviewed 05/23/2023 C-Spine MRI which also show multilevel cervical spondylosis, with left neuroforaminal stenosis at C6-7 and moderate right neuroforaminal  stenosis at C5-6.             Assessment and Plan:   Assessment:  37 year old male with neck pain and arm weakness consistent with cervical radiculopathy.  Currently he is trying many different conservative management options with significant relief of his symptoms.  He is here to discuss operative interventions if needed.  We discussed possible cervical disc arthroplasty if conservative options fail in the future.     Risks of this surgery include risk of infection, risk of dural tear resulting in CSF leak which might result in headaches, or possible need for lumbar drain, or possible revision surgery in the setting of a persistent leak. Risk of hematoma or seroma resulting in wound complications.  Possible nerve root injury resulting in  numbness weakness or paralysis into the arms or legs. Possible radiculitis which could result in similar symptoms or could result in significant neurogenic type pain. There is also a risk of delayed onset nerve root pals or radiculitis, which I explained is potentially due to stretch injury or possibly due to delayed onset swelling, and is sometimes temporary but can also be permanent.  Risk of incomplete decompression which might require revision surgery in the future.  Risk of adjacent segment problems requiring surgery in the future. Risk of incomplete relief of symptoms possibly requiring revision surgery in the future. Furthermore, although rare, there are risks of major vessel injury such as to the major vessels anteriorly or to the bowel from the surgery.  Sometimes this can happen if an instrument is passed anteriorly through the disc space.  I do use imaging to help guide the placement of the instrumentation, but even with this there is a chance of implant malposition or problem.  There is a risk of blood clots in the legs or the lungs.  Lastly, although rare, there are certainly risks of the anesthetic including stroke heart attack and death.      We recommended that the patient continues physical therapy, ibuprofen, gabapentin, and steroid injections as needed for relief of his symptoms.  We encouraged the patient to follow-up as needed to further discuss operative interventions if symptoms become debilitating and significantly affect his quality of life.      Baltazar Pace MD  Orthopedic Surgery PGY1  [phone]    Attending MD (Dr. Tano Edgar) :  I reviewed and verified the history and physical exam of the patient and discussed the patient's management with the other clinical providers involved in this patient's care including any involved residents or physicians assistants. I reviewed the above note and agree with the documented findings and plan of care, which were communicated to the  patient.      Tano Edgar MD  Respectfully,  Tano Edgar MD  Spine Surgery  [facility]

## 2023-07-13 ENCOUNTER — THERAPY VISIT (OUTPATIENT)
Dept: PHYSICAL THERAPY | Facility: REHABILITATION | Age: 38
End: 2023-07-13
Payer: COMMERCIAL

## 2023-07-13 DIAGNOSIS — M54.2 NECK PAIN: ICD-10-CM

## 2023-07-13 DIAGNOSIS — R29.898 LEFT ARM WEAKNESS: ICD-10-CM

## 2023-07-13 DIAGNOSIS — M54.12 CERVICAL RADICULOPATHY: Primary | ICD-10-CM

## 2023-07-13 PROCEDURE — 97140 MANUAL THERAPY 1/> REGIONS: CPT | Mod: GP

## 2023-07-13 PROCEDURE — 97110 THERAPEUTIC EXERCISES: CPT | Mod: GP

## 2023-07-18 ENCOUNTER — THERAPY VISIT (OUTPATIENT)
Dept: PHYSICAL THERAPY | Facility: REHABILITATION | Age: 38
End: 2023-07-18
Payer: COMMERCIAL

## 2023-07-18 DIAGNOSIS — M54.2 NECK PAIN: ICD-10-CM

## 2023-07-18 DIAGNOSIS — R29.898 LEFT ARM WEAKNESS: ICD-10-CM

## 2023-07-18 DIAGNOSIS — M54.12 CERVICAL RADICULOPATHY: Primary | ICD-10-CM

## 2023-07-18 PROCEDURE — 97110 THERAPEUTIC EXERCISES: CPT | Mod: GP

## 2023-07-18 PROCEDURE — 97140 MANUAL THERAPY 1/> REGIONS: CPT | Mod: GP

## 2023-08-14 ENCOUNTER — OFFICE VISIT (OUTPATIENT)
Dept: FAMILY MEDICINE | Facility: CLINIC | Age: 38
End: 2023-08-14
Payer: COMMERCIAL

## 2023-08-14 VITALS
OXYGEN SATURATION: 100 % | DIASTOLIC BLOOD PRESSURE: 70 MMHG | BODY MASS INDEX: 25.2 KG/M2 | TEMPERATURE: 98 F | SYSTOLIC BLOOD PRESSURE: 122 MMHG | HEIGHT: 70 IN | RESPIRATION RATE: 12 BRPM | HEART RATE: 68 BPM | WEIGHT: 176 LBS

## 2023-08-14 DIAGNOSIS — D48.9 NEOPLASM OF UNCERTAIN BEHAVIOR: ICD-10-CM

## 2023-08-14 DIAGNOSIS — H93.93 PROBLEM OF BOTH EARS: ICD-10-CM

## 2023-08-14 DIAGNOSIS — Z12.83 SKIN CANCER SCREENING: ICD-10-CM

## 2023-08-14 DIAGNOSIS — R00.2 PALPITATIONS: Primary | ICD-10-CM

## 2023-08-14 PROBLEM — M54.2 NECK PAIN: Status: RESOLVED | Noted: 2023-05-23 | Resolved: 2023-08-14

## 2023-08-14 PROBLEM — R07.9 CHEST PAIN, UNSPECIFIED TYPE: Status: RESOLVED | Noted: 2023-04-22 | Resolved: 2023-08-14

## 2023-08-14 PROCEDURE — 88305 TISSUE EXAM BY PATHOLOGIST: CPT | Performed by: PATHOLOGY

## 2023-08-14 PROCEDURE — 99213 OFFICE O/P EST LOW 20 MIN: CPT | Mod: 25 | Performed by: FAMILY MEDICINE

## 2023-08-14 PROCEDURE — 11302 SHAVE SKIN LESION 1.1-2.0 CM: CPT | Performed by: FAMILY MEDICINE

## 2023-08-14 ASSESSMENT — PAIN SCALES - GENERAL: PAINLEVEL: NO PAIN (0)

## 2023-08-14 ASSESSMENT — ENCOUNTER SYMPTOMS: BACK PAIN: 1

## 2023-08-14 NOTE — PROGRESS NOTES
"  {PROVIDER CHARTING PREFERENCE:579156}    Glen Kemp is a 38 year old, presenting for the following health issues:  Mole, Heart Problem, and Back Pain        8/14/2023    12:32 PM   Additional Questions   Roomed by Virgil Cuello   Accompanied by N/A       Heart Problem    Back Pain     History of Present Illness       Reason for visit:  1) followup on heart issue 2) update on back issue 3) new mole    He eats 2-3 servings of fruits and vegetables daily.He consumes 0 sweetened beverage(s) daily.He exercises with enough effort to increase his heart rate 60 or more minutes per day.  He exercises with enough effort to increase his heart rate 3 or less days per week.   He is taking medications regularly.       {SUPERLIST (Optional):855754}  {additonal problems for provider to add (Optional):203405}      Review of Systems   Musculoskeletal:  Positive for back pain.      {ROS COMP (Optional):470971}      Objective    /70 (BP Location: Right arm, Patient Position: Sitting, Cuff Size: Adult Regular)   Pulse 68   Temp 98  F (36.7  C) (Temporal)   Resp 12   Ht 1.777 m (5' 9.96\")   Wt 79.8 kg (176 lb)   SpO2 100%   BMI 25.28 kg/m    Body mass index is 25.28 kg/m .  Physical Exam   {Exam List (Optional):369747}    {Diagnostic Test Results (Optional):703476}    {AMBULATORY ATTESTATION (Optional):369364}              "

## 2023-08-14 NOTE — PROCEDURES
Procedure - Biopsy or lesion removal  -Potential benefits (diagnosis) and risks (infection, bleeding, damage to underlying tissues) reviewed. VErbal consent obtained  -Biopsy site anesthetized using 1 mL of 1% lidocaine WITH epinephrine  -Sterile field established using sterile gloves, drape, and iodine swab  -Shave performed. 1.2 cm in length/diameter  -Sample sent for pathology  -Hemostasis achieved with silver nitrate. Estimated blood loss minimal  -Complications noted: none

## 2023-08-14 NOTE — PROGRESS NOTES
"Assessment/Plan.    Palpitations.  Rhythm monitor notable for relatively large percentage of the day with tachycardia.  No significant ectopy.  Stress test reassuring, other than slow HR recovery from exercise.  -see cardiology    Pigmented lesion of right shoulder. Suspect SK.  -discussed observation vs shave biopsy. Justo chose the latter. See procedure note  -given multiple moles, will refer to Dermatology for periodic skin cancer screening    Red ear syndrome? Symptoms persist.  -encouraged Justo to try moving gabapentin (prescribed for cervical radiculopathy) to 1 hr prior to bedtime rather than at bedtime    Orders Placed This Encounter   Procedures    SHAV SKIN LESION TRUNK/ARM/LEG 1.1-2.0 CM    Adult Dermatology Referral     ----  Chief complaint: Mole, Heart Problem, and Back Pain    Social History     Social History Narrative    Updated 4/20/2023: Grew up in [place]. Lives with wife, 2 kids (born around 2018, 2020). 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     Cervical radiculopathy.  Onset in March.  Developed a sense of weakness in the left triceps.  Had MRI.  Had steroid injection.  Did physical therapy.  Symptoms have improved.  Denies history of spine surgery.  Patient did experience sciatica symptoms in college.    Skin lesion of concern.  Patient recently noticed lesion on the right shoulder.  Denies a personal history of skin cancer.  Has had benign biopsy results of other lesions in the past.  Dad and paternal grandfather with hx of non-melanoma skin cancer.    Red ear syndrome?  Ears feel warm and painful. Usually happens around bedtime or following exposure to warm water.    Exam  /70 (BP Location: Right arm, Patient Position: Sitting, Cuff Size: Adult Regular)   Pulse 68   Temp 98  F (36.7  C) (Temporal)   Resp 12   Ht 1.777 m (5' 9.96\")   Wt 79.8 kg (176 lb)   SpO2 100%   BMI 25.28 kg/m      Mildly raised brown lesion on right upper back, about " 1.2cm in diameter

## 2023-08-15 PROBLEM — M48.02 NEUROFORAMINAL STENOSIS OF CERVICAL SPINE: Status: RESOLVED | Noted: 2023-05-26 | Resolved: 2023-08-15

## 2023-08-15 PROBLEM — M54.9 UPPER BACK PAIN: Status: RESOLVED | Noted: 2023-05-26 | Resolved: 2023-08-15

## 2023-08-15 PROBLEM — R29.898 LEFT ARM WEAKNESS: Status: RESOLVED | Noted: 2023-05-23 | Resolved: 2023-08-15

## 2023-08-15 PROBLEM — H93.90 EAR PROBLEM: Status: ACTIVE | Noted: 2023-08-15

## 2023-08-16 LAB
PATH REPORT.COMMENTS IMP SPEC: NORMAL
PATH REPORT.COMMENTS IMP SPEC: NORMAL
PATH REPORT.FINAL DX SPEC: NORMAL
PATH REPORT.GROSS SPEC: NORMAL
PATH REPORT.MICROSCOPIC SPEC OTHER STN: NORMAL
PATH REPORT.RELEVANT HX SPEC: NORMAL
PHOTO IMAGE: NORMAL

## 2023-08-16 NOTE — TELEPHONE ENCOUNTER
RECORDS RECEIVED FROM:    DATE RECEIVED:    NOTES STATUS DETAILS   OFFICE NOTE from referring provider  Internal Original appt 4-20-23   OFFICE NOTE from other cardiologists  N/A    RECORDS from hospital/ED N/A    MEDICATION LIST Internal    GENERAL CARDIO RECORDS   (ALL APPOINTMENT TYPES)     LABS (CBC,BMP,CMP, TSH) Internal    EKG (STRIPS & REPORTS) Internal 9-1-21   MONITORS (STRIPS & REPORTS) Internal 4-27-23   ECHOS (IMAGES AND REPORTS) N/A    STRESS TESTS (IMAGES AND REPORTS) Internal 4-27-23   cMRI (IMAGES AND REPORTS) N/A    CT/CTA (IMAGES AND REPORTS) N/A

## 2023-08-22 ENCOUNTER — OFFICE VISIT (OUTPATIENT)
Dept: CARDIOLOGY | Facility: CLINIC | Age: 38
End: 2023-08-22
Attending: FAMILY MEDICINE
Payer: COMMERCIAL

## 2023-08-22 ENCOUNTER — PRE VISIT (OUTPATIENT)
Dept: CARDIOLOGY | Facility: CLINIC | Age: 38
End: 2023-08-22
Payer: COMMERCIAL

## 2023-08-22 VITALS
HEART RATE: 59 BPM | HEIGHT: 70 IN | OXYGEN SATURATION: 98 % | SYSTOLIC BLOOD PRESSURE: 109 MMHG | BODY MASS INDEX: 25.61 KG/M2 | WEIGHT: 178.9 LBS | DIASTOLIC BLOOD PRESSURE: 71 MMHG

## 2023-08-22 DIAGNOSIS — R00.2 PALPITATIONS: ICD-10-CM

## 2023-08-22 PROCEDURE — G0463 HOSPITAL OUTPT CLINIC VISIT: HCPCS | Performed by: INTERNAL MEDICINE

## 2023-08-22 PROCEDURE — 99204 OFFICE O/P NEW MOD 45 MIN: CPT | Performed by: INTERNAL MEDICINE

## 2023-08-22 ASSESSMENT — PAIN SCALES - GENERAL: PAINLEVEL: NO PAIN (0)

## 2023-08-22 NOTE — NURSING NOTE
Chief Complaint   Patient presents with    New Patient     new - referral from PCP         Vitals were taken, medications reconciled.    Paige Thurner, Facilitator   9:55 AM

## 2023-08-22 NOTE — LETTER
8/22/2023      RE: Justo Cardoza  [address] [facility] [address]       Dear Colleague,    Thank you for the opportunity to participate in the care of your patient, Justo Cardoza, at the [facility] [place] [facility]. Please see a copy of my visit note below.       SUBJECTIVE:  Justo Cardoza is a 38 year old male who presents for evaluation of tachycardia.  Patient had 1 episode of fast heartbeat at the age of 16.  As it was not subsiding EMS was called but by the time they arrived he was in normal sinus rhythm.  5 years later patient went to donate blood and this was denied as his heart rate was irregular.  No currently when he exercises his heart rate goes above 170.  Patient and wife concerned about the increased heart rate.  But this fast heartbeat is not associated with any cardiac symptoms.  Patient has no significant cardiac risk factors.  No excess alcohol caffeine or caffeinated drinks.  No history of sustained tachyarrhythmia.  No dizziness or loss of consciousness.  Patient exercises 3 times a week without cardiac symptoms.  As he also reported some chest pain PCP ordered a stress test and this was reported as abnormal.    Patient Active Problem List    Diagnosis Date Noted    Red ear syndrome 08/15/2023     Priority: Medium    Cervical radiculopathy 05/23/2023     Priority: Medium     Updated 8/15/2023:  ALAINA left C7-T1 in 6/2023.      Palpitations 04/22/2023     Priority: Medium    Cold sore 04/22/2023     Priority: Medium    Bipolar disorder (H) 04/22/2023     Priority: Medium    Dry eyes 06/06/2022     Priority: Medium    Insomnia 1983     Priority: Medium    .  Current Outpatient Medications   Medication Sig    famotidine (PEPCID) 10 MG tablet Take 10 mg by mouth 2 times daily    gabapentin (NEURONTIN) 300 MG capsule Take 1 capsule (300 mg) by mouth 3 times daily according to chart in the chart in after visit summary     melatonin 3 MG CAPS Take 3 mg by mouth once as needed (For sleep)    multivitamin, therapeutic (THERA-VIT) TABS tablet Take 1 tablet by mouth daily    olopatadine (PATADAY) 0.2 % ophthalmic solution Place 0.05 mLs (1 drop) into both eyes daily    QUEtiapine (SEROQUEL) 50 MG tablet Take 50 mg by mouth At Bedtime    valACYclovir (VALTREX) 1000 mg tablet TAKE 2 TABLETS BY MOUTH WHEN SYMPTOMS OF COLD SORES START, AND 2 TABLETS 12  HOURS LATER, FOR TOTAL OF 1 DAY TREATMENT    zolpidem (AMBIEN) 10 MG tablet      No current facility-administered medications for this visit.     Past Medical History:   Diagnosis Date    Bipolar affective disorder in remission (H)     Recurrent cold sores     Varicella      Past Surgical History:   Procedure Laterality Date    APPENDECTOMY  2000    GENITOURINARY SURGERY  2009    Hydrocelectomy    HERNIA REPAIR, INGUINAL RT/LT  1995    INJECT EPIDURAL CERVICAL N/A 6/5/2023    Procedure: C7-T1 interlaminar epidural steroid injection;  Surgeon: Jayesh Fuchs MD;  Location: [facility] OR     No Known Allergies  Social History     Socioeconomic History    Marital status:      Spouse name: Not on file    Number of children: Not on file    Years of education: Not on file    Highest education level: Not on file   Occupational History    Not on file   Tobacco Use    Smoking status: Never    Smokeless tobacco: Never   Vaping Use    Vaping Use: Never used   Substance and Sexual Activity    Alcohol use: Yes     Types: 4 Standard drinks or equivalent per week     Comment: 2-3 days a week    Drug use: No    Sexual activity: Yes     Partners: Female     Birth control/protection: None   Other Topics Concern    Parent/sibling w/ CABG, MI or angioplasty before 65F 55M? No   Social History Narrative    Updated 4/20/2023: Grew up in [place]. Lives with wife, 2 kids (born around 2018, 2020). 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     Social Determinants of Health  "    Financial Resource Strain: Not on file   Food Insecurity: Not on file   Transportation Needs: Not on file   Physical Activity: Not on file   Stress: Not on file   Social Connections: Not on file   Intimate Partner Violence: Not on file   Housing Stability: Not on file     Family History   Problem Relation Age of Onset    Hypertension Mother     Skin Cancer Father     Depression Father     Depression Brother     Skin Cancer Paternal Grandfather     Prostate Cancer Paternal Grandfather     Coronary Artery Disease Paternal Grandfather         bypass    Glaucoma No family hx of     Macular Degeneration No family hx of     Colorectal Cancer No family hx of           REVIEW OF SYSTEMS:  General: negative, fever, chills, night sweats  Skin: negative, acne, rash, and scaling  Eyes: negative, double vision, eye pain, and photophobia  Ears/Nose/Throat: negative, nasal congestion, and purulent rhinorrhea  Respiratory: No dyspnea on exertion, No cough, No hemoptysis, and negative  Cardiovascular: negative, exertional chest pain or pressure, paroxysmal nocturnal dyspnea, dyspnea on exertion, and orthopnea       OBJECTIVE:  Blood pressure 109/71, pulse 59, height 1.775 m (5' 9.88\"), weight 81.1 kg (178 lb 14.4 oz), SpO2 98 %.  General Appearance: healthy, alert, active, and no distress  Head: Normocephalic. No masses, lesions, tenderness or abnormalities  Eyes: conjuctiva clear, PERRL, EOM intact  Ears: External ears normal. Canals clear. TM's normal.  Nose: Nares normal  Mouth: normal  Neck: Supple, no cervical adenopathy, no thyromegaly  Lungs: clear to auscultation  Cardiac: regular rate and rhythm, normal S1 and S2, no murmur       ASSESSMENT/PLAN:  Patient here for evaluation of increased heart rate during activity.  Patient do have a sedentary job but exercises regularly.  No symptoms during exercise but noticed his heart rate going up to 170 bpm when he plays basketball or tennis.  He and his wife are concerned about " the increased heart rate.  Had no symptoms during this increased heart rate.  Patient had 1 episodes of fast heartbeats in the past.  At age 16.  5 years later he went to donate blood and he was declined as his pulse rate was irregular..  But no other significant cardiac history.  He has no significant cardiac risk factors.  As patient also reported some chest pain PCP ordered a exercise stress test.  Patient was functional capacity was excellent.  Exercised over 90 minutes on Pj protocol.  No symptoms during exercise.  But his heart rate recovery was slow but no other significant findings.  Images were completely normal.  EKG showed 1 mm ST depression in inferior leads.  This is nondiagnostic.  Patient also had a 48-hour Holter monitor.  Patient was exercising heavily during this monitoring.  This monitor showed less than 1% PACs and PVCs.  Fastest heart rate was 176 bpm during exercise as he was playing basketball.  No other arrhythmia was detected.  His EKG shows normal sinus rhythm.  Sinus bradycardia otherwise unremarkable.  Discussed all the results with the patient.  Patient was reassured that there is no significant abnormality that require further evaluation or treatment.  Patient is reassured and he will contact the clinic if he develop any symptoms.  Total visit duration 45 minutes.  This included face-to-face interview, physical exam, chart review, review of EKG,  stress echo, Holter monitor and documentation.      Please do not hesitate to contact me if you have any questions/concerns.     Sincerely,     MAYELA King MD

## 2023-08-22 NOTE — PROGRESS NOTES
SUBJECTIVE:  Justo Cardoza is a 38 year old male who presents for evaluation of tachycardia.  Patient had 1 episode of fast heartbeat at the age of 16.  As it was not subsiding EMS was called but by the time they arrived he was in normal sinus rhythm.  5 years later patient went to donate blood and this was denied as his heart rate was irregular.  No currently when he exercises his heart rate goes above 170.  Patient and wife concerned about the increased heart rate.  But this fast heartbeat is not associated with any cardiac symptoms.  Patient has no significant cardiac risk factors.  No excess alcohol caffeine or caffeinated drinks.  No history of sustained tachyarrhythmia.  No dizziness or loss of consciousness.  Patient exercises 3 times a week without cardiac symptoms.  As he also reported some chest pain PCP ordered a stress test and this was reported as abnormal.    Patient Active Problem List    Diagnosis Date Noted    Red ear syndrome 08/15/2023     Priority: Medium    Cervical radiculopathy 05/23/2023     Priority: Medium     Updated 8/15/2023:  ALAINA left C7-T1 in 6/2023.      Palpitations 04/22/2023     Priority: Medium    Cold sore 04/22/2023     Priority: Medium    Bipolar disorder (H) 04/22/2023     Priority: Medium    Dry eyes 06/06/2022     Priority: Medium    Insomnia 1983     Priority: Medium    .  Current Outpatient Medications   Medication Sig    famotidine (PEPCID) 10 MG tablet Take 10 mg by mouth 2 times daily    gabapentin (NEURONTIN) 300 MG capsule Take 1 capsule (300 mg) by mouth 3 times daily according to chart in the chart in after visit summary    melatonin 3 MG CAPS Take 3 mg by mouth once as needed (For sleep)    multivitamin, therapeutic (THERA-VIT) TABS tablet Take 1 tablet by mouth daily    olopatadine (PATADAY) 0.2 % ophthalmic solution Place 0.05 mLs (1 drop) into both eyes daily    QUEtiapine (SEROQUEL) 50 MG tablet Take 50 mg by mouth At Bedtime    valACYclovir (VALTREX)  1000 mg tablet TAKE 2 TABLETS BY MOUTH WHEN SYMPTOMS OF COLD SORES START, AND 2 TABLETS 12  HOURS LATER, FOR TOTAL OF 1 DAY TREATMENT    zolpidem (AMBIEN) 10 MG tablet      No current facility-administered medications for this visit.     Past Medical History:   Diagnosis Date    Bipolar affective disorder in remission (H)     Recurrent cold sores     Varicella      Past Surgical History:   Procedure Laterality Date    APPENDECTOMY  2000    GENITOURINARY SURGERY  2009    Hydrocelectomy    HERNIA REPAIR, INGUINAL RT/LT  1995    INJECT EPIDURAL CERVICAL N/A 6/5/2023    Procedure: C7-T1 interlaminar epidural steroid injection;  Surgeon: Jayesh Fuchs MD;  Location: [facility] OR     No Known Allergies  Social History     Socioeconomic History    Marital status:      Spouse name: Not on file    Number of children: Not on file    Years of education: Not on file    Highest education level: Not on file   Occupational History    Not on file   Tobacco Use    Smoking status: Never    Smokeless tobacco: Never   Vaping Use    Vaping Use: Never used   Substance and Sexual Activity    Alcohol use: Yes     Types: 4 Standard drinks or equivalent per week     Comment: 2-3 days a week    Drug use: No    Sexual activity: Yes     Partners: Female     Birth control/protection: None   Other Topics Concern    Parent/sibling w/ CABG, MI or angioplasty before 65F 55M? No   Social History Narrative    Updated 4/20/2023: Grew up in [place]. Lives with wife, 2 kids (born around 2018, 2020). 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     Social Determinants of Health     Financial Resource Strain: Not on file   Food Insecurity: Not on file   Transportation Needs: Not on file   Physical Activity: Not on file   Stress: Not on file   Social Connections: Not on file   Intimate Partner Violence: Not on file   Housing Stability: Not on file     Family History   Problem Relation Age of Onset    Hypertension Mother      "Skin Cancer Father     Depression Father     Depression Brother     Skin Cancer Paternal Grandfather     Prostate Cancer Paternal Grandfather     Coronary Artery Disease Paternal Grandfather         bypass    Glaucoma No family hx of     Macular Degeneration No family hx of     Colorectal Cancer No family hx of           REVIEW OF SYSTEMS:  General: negative, fever, chills, night sweats  Skin: negative, acne, rash, and scaling  Eyes: negative, double vision, eye pain, and photophobia  Ears/Nose/Throat: negative, nasal congestion, and purulent rhinorrhea  Respiratory: No dyspnea on exertion, No cough, No hemoptysis, and negative  Cardiovascular: negative, exertional chest pain or pressure, paroxysmal nocturnal dyspnea, dyspnea on exertion, and orthopnea       OBJECTIVE:  Blood pressure 109/71, pulse 59, height 1.775 m (5' 9.88\"), weight 81.1 kg (178 lb 14.4 oz), SpO2 98 %.  General Appearance: healthy, alert, active, and no distress  Head: Normocephalic. No masses, lesions, tenderness or abnormalities  Eyes: conjuctiva clear, PERRL, EOM intact  Ears: External ears normal. Canals clear. TM's normal.  Nose: Nares normal  Mouth: normal  Neck: Supple, no cervical adenopathy, no thyromegaly  Lungs: clear to auscultation  Cardiac: regular rate and rhythm, normal S1 and S2, no murmur       ASSESSMENT/PLAN:  Patient here for evaluation of increased heart rate during activity.  Patient do have a sedentary job but exercises regularly.  No symptoms during exercise but noticed his heart rate going up to 170 bpm when he plays basketball or tennis.  He and his wife are concerned about the increased heart rate.  Had no symptoms during this increased heart rate.  Patient had 1 episodes of fast heartbeats in the past.  At age 16.  5 years later he went to donate blood and he was declined as his pulse rate was irregular..  But no other significant cardiac history.  He has no significant cardiac risk factors.  As patient also reported " some chest pain PCP ordered a exercise stress test.  Patient was functional capacity was excellent.  Exercised over 90 minutes on Pj protocol.  No symptoms during exercise.  But his heart rate recovery was slow but no other significant findings.  Images were completely normal.  EKG showed 1 mm ST depression in inferior leads.  This is nondiagnostic.  Patient also had a 48-hour Holter monitor.  Patient was exercising heavily during this monitoring.  This monitor showed less than 1% PACs and PVCs.  Fastest heart rate was 176 bpm during exercise as he was playing basketball.  No other arrhythmia was detected.  His EKG shows normal sinus rhythm.  Sinus bradycardia otherwise unremarkable.  Discussed all the results with the patient.  Patient was reassured that there is no significant abnormality that require further evaluation or treatment.  Patient is reassured and he will contact the clinic if he develop any symptoms.  Total visit duration 45 minutes.  This included face-to-face interview, physical exam, chart review, review of EKG,  stress echo, Holter monitor and documentation.

## 2023-08-22 NOTE — PATIENT INSTRUCTIONS
August 22, 2023    Cardiology Provider You Saw During Your Visit: Dr. Brown      Medication Changes: None      Follow Up: No cardiology follow-up indicated        Follow the American Heart Association Diet and Lifestyle Recommendations:  -Limit saturated fat, trans fat, sodium, red meat, sweets and sugar-sweetened beverages. If you choose to eat red meat, compare labels and select the leanest cuts available.  -Aim for at least 150 minutes of moderate physical activity or 75 minutes of vigorous physical activity - or an equal combination of both - each week.      To Reach Us:  -During business hours: [phone], press option # 1 to schedule an appointment or to leave a message for your care team.     -After hours, weekends or holidays: [phone], press option #4 and ask to speak to the on-call cardiologist. Inform them you are a patient at the [facility].        **If you have a cardiac device, please make sure you schedule an in-person device check just prior to your cardiology provider appointments**        Kayli Lorenzo RN  Cardiology Care Coordinator - General Cardiology  [facility]

## 2023-10-16 DIAGNOSIS — M54.9 UPPER BACK PAIN: ICD-10-CM

## 2023-10-16 DIAGNOSIS — M54.2 NECK PAIN: ICD-10-CM

## 2023-10-16 DIAGNOSIS — R29.898 LEFT ARM WEAKNESS: ICD-10-CM

## 2023-10-16 DIAGNOSIS — M54.12 CERVICAL RADICULOPATHY: ICD-10-CM

## 2023-10-16 RX ORDER — GABAPENTIN 300 MG/1
300 CAPSULE ORAL 3 TIMES DAILY
Qty: 90 CAPSULE | Refills: 1 | Status: SHIPPED | OUTPATIENT
Start: 2023-10-16 | End: 2024-05-02

## 2023-11-14 NOTE — TELEPHONE ENCOUNTER
Medication: HYDROcodone-acetaminophen 5-325 MG     Date of last refill: 10/17/23  [place] 42[place]11    Last office visit: 11/13/23  Due back to clinic per last office note:  6 months  Date next office visit scheduled:  05/13/24    Last URINE Screen: 11/13/23  Screen Results:  not yet complete      Narcotic Contract EXPIRATION date: 11/13/24    Last OV note recommendation:   ASSESSMENT AND PLAN:      1. Chronic narcotic use    2. Opioid dependence, uncomplicated ([address]    3. Spinal stenosis of lumbar region without neurogenic claudication    4. S/P cervical spinal fusion       The patient is a pleasant 59-year-old female with a longstanding history of opiate dependence. This is as result of chronic pain due to spinal stenosis in the lumbar region. She is not interested in surgical correction. States the medications do improve function and provide analgesia. [place] . Reviewed her [place] which is consistent with hydrocodone usage. She is due for repeat UDS which was ordered today. Narcotic contract results reviewed today. All patient related questions addressed. Patient can follow-up in 6 months. Pt is requesting new rx for    Valacyclovir hcl 1gm tabs    Did not see on active med list please verify and send new rx    [facility]  [phone]

## 2023-12-27 NOTE — PROGRESS NOTES
DISCHARGE  Reason for Discharge: Patient has failed to schedule further appointments.    Equipment Issued: theraband    Discharge Plan: Patient to continue home program.    Referring Provider:  Shweta Rawls       07/18/23 0500   Appointment Info   Signing clinician's name / credentials Mariaa Field, PT, DPT   Total/Authorized Visits 12   Visits Used 6   Medical Diagnosis Upper back pain  Cervical radiculopathy  Left arm weakness  Neck pain   PT Tx Diagnosis left cervical radiculopathy, left arm weakness   Other pertinent information MRI 5/23/23: IMPRESSION:  Multilevel cervical spondylosis, most significantly including severe  left neuroforaminal stenosis at C6-7 and moderate right neuroforaminal  stenosis at C5-6.   Progress Note/Certification   Start of Care Date 05/23/23   Therapy Frequency 1-2x/week   Predicted Duration 8 weeks   Progress Note Due Date 08/21/23   GOALS   PT Goals 2;3;4   PT Goal 1   Goal Identifier HEP   Goal Description Pt will be independent in HEP to address impairments and improve funtion   Goal Progress Met- will continue to progress as able   Target Date 07/04/23   Date Met 06/27/23   PT Goal 2   Goal Identifier Strength   Goal Description Pt will increase L UE elbow extension strength to 5/5 for return to lifting, dependent care and PLOF   Goal Progress met; able to use >15 lbs with tricep extensions for >15 reps   Target Date 07/18/23   Date Met 07/18/23   PT Goal 3   Goal Identifier Work   Goal Description Pt will be able to work at his computer throughout his day without pain for improvement in his quality of life   Goal Progress improved; still some pain is sitting too much and without good posture   Target Date 07/18/23   Subjective Report   Subjective Report Pain is still fairly intermittent and manageable. Feels only into top of shoulder/scapula. Intermittent hand tingling. Continues to increase mileage with running. HEP going well- still can feel pain if not perform chin tucks  correctly or pushing too far into retraction. Inflateable traction unit feels more helpful lately. Using daily x 20 minutes. Has a brace that helps keep him more upright while sitting that he is using intermittently.   Objective Measures   Objective Measures Objective Measure 1;Objective Measure 2;Objective Measure 3   Objective Measure 1   Objective Measure NDI: 14%   Objective Measure 2   Objective Measure posture   Details continues to need cues for proper cervical and thoracic alignment while sitting   Treatment Interventions (PT)   Interventions Therapeutic Procedure/Exercise;Manual Therapy   Therapeutic Procedure/Exercise   Therapeutic Procedures: strength, endurance, ROM, flexibillity minutes (97049) 40   Therapeutic Procedures Ther Proc 2;Ther Proc 3;Ther Proc 4;Ther Proc 5   Ther Proc 1 posture review   Ther Proc 1 - Details reiterated importance of proper posture at thoracic and cervical spine. Encouraged check-ins throughout the day   Ther Proc 2 Running program   Ther Proc 2 - Details did not discuss   Ther Proc 3 Tricep extension in supine   Ther Proc 3 - Details did not perform   Ther Proc 4 discussed lifting routine and how to progress/add   Ther Proc 4 - Details emphasized importance of good posture while lifting with quality over quantity or increase in weight. advised pt to not neglect back strengthening, and provided HEP for scapula exercises in prone including I's, Y's and T's. Also gave lat exercise.   PTRx Ther Proc 1 Deep Neck Flexors Endurance Training    PTRx Ther Proc 1 - Details improved form today-feel onset of pain could be secondary to pt holding for 30 seconds. decrease time and focus on form   PTRx Ther Proc 2 Cervical Retraction   PTRx Ther Proc 2 - Details did not perform   PTRx Ther Proc 3 Bent Over Tricep Extension   PTRx Ther Proc 3 - Details did not perform   PTRx Ther Proc 4 Cervical Retraction With Patient Overpressure   PTRx Ther Proc 4 - Details did not perform   Skilled  Intervention Education  lifting routine, running, finalized HEP,progression of HEP for further cervical strengthening to reduce pain, improve strength and return to PLOF   Ther Proc 5 thoracic extension   Ther Proc 5 - Details provided 2 exercises to promote improved thoracic extension including seated active extension and supine passive extension over towel roll   Manual Therapy   Manual Therapy: Mobilization, MFR, MLD, friction massage minutes (06955) 12   Manual Therapy Manual Therapy 2;Manual Therapy 3   Manual Therapy 1 manual cervical traction   Manual Therapy 1 - Details pt in supine with neck flexed to ~ 15 degrees. manual traction 2 x 5mintues.   Skilled Intervention cervical traction to promote reduce nerve irritation to reduce tingling in hand and left upper shoulder/scapular discomfort   Education   Learner/Method Patient;Pictures/Video   Plan   Home program ptrx   Plan for next session tenative d/c with option to schedule within one month of symptoms worsen   Comments   Comments Pt has attended 6 visits for left sided cervical radiculopathy. Pt demonstrates improved tricep strength, centralization of symptoms, and abiltiy to return to PLOF with only intermittent symptoms. Pt appears satisfied with the progress made throughout the course of therapy, and motivated to continue with HEP and postural recommendations independently. Pt is appropriate to tenatively discharge from physical therapy today, with the option to return within one month of symptoms worsen.   Total Session Time   Timed Code Treatment Minutes 52   Total Treatment Time (sum of timed and untimed services) 52

## 2024-02-22 ENCOUNTER — OFFICE VISIT (OUTPATIENT)
Dept: DERMATOLOGY | Facility: CLINIC | Age: 39
End: 2024-02-22
Payer: COMMERCIAL

## 2024-02-22 DIAGNOSIS — D22.9 MULTIPLE BENIGN NEVI: Primary | ICD-10-CM

## 2024-02-22 DIAGNOSIS — Z12.83 SKIN CANCER SCREENING: ICD-10-CM

## 2024-02-22 DIAGNOSIS — L81.4 SOLAR LENTIGO: ICD-10-CM

## 2024-02-22 PROCEDURE — 99203 OFFICE O/P NEW LOW 30 MIN: CPT | Mod: GC | Performed by: DERMATOLOGY

## 2024-02-22 ASSESSMENT — PAIN SCALES - GENERAL: PAINLEVEL: NO PAIN (0)

## 2024-02-22 NOTE — PROGRESS NOTES
[facility] Dermatology Note  Encounter Date: Feb 22, 2024  Office Visit     Dermatology Problem List:  1. Seborrheic keratosis, R shoulder s/p shave bx 8/2023  2. Hx of benign nevi biopsied (not in our system)    SH: Grew up in [place]  ____________________________________________    Assessment & Plan:     # Benign melanocytic nevi  Nevi examined have a re-assuring network on dermoscopy  - ABCDEs of melanoma and sun protection recommendations reviewed  - sun protection with SPF30+ and sun-protective clothing reviewed    # Solar lentigines  Lesions benign nature reviewed, no treatment is indicated at this time, will monitor for any clinical changes  - sun protection with SPF30+ and sun-protective clothing reviewed    Procedures Performed:   None    Follow-up: 2 year(s) in-person, or earlier for new or changing lesions    Staff and Resident:     Misty Hammer MD  Dermatology Resident PGY4    I have seen and examined this patient and agree with the assessment and plan as documented in the resident's note.    Mark Vieira MD  Dermatology Attending    ____________________________________________    CC: Skin Check (Justo is here today for a skin check )    HPI:  Mr. Justo Cardoza is a(n) 38 year old male who presents today as a new patient for FBSE. Was seen by PCP and had an SK biopsied on the R shoulder. Denies a personal history of skin cancer and denies a family history of melanoma. Denies any specific concerns today. No new, changing, concerning, symptomatic lesions.    Patient is otherwise feeling well, without additional skin concerns.    Labs Reviewed:  N/A    Physical Exam:  Vitals: There were no vitals taken for this visit.  SKIN: Full skin, which includes the head/face, both arms, chest, back, abdomen,both legs, buttocks, digits and/or nails, was examined.  - Multiple regular brown pigmented macules and papules (some of which are pink) are identified on the trunk and extremities.   -  Scattered brown macules on sun exposed areas.  - Indented scar on the mid upper back without pigment within   - No other lesions of concern on areas examined.     Medications:  Current Outpatient Medications   Medication     famotidine (PEPCID) 10 MG tablet     gabapentin (NEURONTIN) 300 MG capsule     melatonin 3 MG CAPS     multivitamin, therapeutic (THERA-VIT) TABS tablet     olopatadine (PATADAY) 0.2 % ophthalmic solution     QUEtiapine (SEROQUEL) 50 MG tablet     valACYclovir (VALTREX) 1000 mg tablet     zolpidem (AMBIEN) 10 MG tablet     No current facility-administered medications for this visit.      Past Medical History:   Patient Active Problem List   Diagnosis     Dry eyes     Palpitations     Insomnia     Cold sore     Bipolar disorder (H)     Cervical radiculopathy     Red ear syndrome     Past Medical History:   Diagnosis Date     Bipolar affective disorder in remission (H24)      Recurrent cold sores      Varicella        CC Mark Anthony Blue MD  [address]  [place] [address] on close of this encounter.

## 2024-02-22 NOTE — LETTER
2/22/2024       RE: Justo Cardoza  [address] [address]     Dear Colleague,    Thank you for referring your patient, Justo Cardoza, to the [facility] [place] at [facility]. Please see a copy of my visit note below.    [facility] Dermatology Note  Encounter Date: Feb 22, 2024  Office Visit     Dermatology Problem List:  1. Seborrheic keratosis, R shoulder s/p shave bx 8/2023  2. Hx of benign nevi biopsied (not in our system)    SH: Grew up in [place]  ____________________________________________    Assessment & Plan:     # Benign melanocytic nevi  Nevi examined have a re-assuring network on dermoscopy  - ABCDEs of melanoma and sun protection recommendations reviewed  - sun protection with SPF30+ and sun-protective clothing reviewed    # Solar lentigines  Lesions benign nature reviewed, no treatment is indicated at this time, will monitor for any clinical changes  - sun protection with SPF30+ and sun-protective clothing reviewed    Procedures Performed:   None    Follow-up: 2 year(s) in-person, or earlier for new or changing lesions    Staff and Resident:     Misty Hammer MD  Dermatology Resident PGY4    I have seen and examined this patient and agree with the assessment and plan as documented in the resident's note.    Mark Vieira MD  Dermatology Attending    ____________________________________________    CC: Skin Check (Justo is here today for a skin check )    HPI:  Mr. Justo Cardoza is a(n) 38 year old male who presents today as a new patient for FBSE. Was seen by PCP and had an SK biopsied on the R shoulder. Denies a personal history of skin cancer and denies a family history of melanoma. Denies any specific concerns today. No new, changing, concerning, symptomatic lesions.    Patient is otherwise feeling well, without additional skin concerns.    Labs Reviewed:  N/A    Physical  Exam:  Vitals: There were no vitals taken for this visit.  SKIN: Full skin, which includes the head/face, both arms, chest, back, abdomen,both legs, buttocks, digits and/or nails, was examined.  - Multiple regular brown pigmented macules and papules (some of which are pink) are identified on the trunk and extremities.   - Scattered brown macules on sun exposed areas.  - Indented scar on the mid upper back without pigment within   - No other lesions of concern on areas examined.     Medications:  Current Outpatient Medications   Medication    famotidine (PEPCID) 10 MG tablet    gabapentin (NEURONTIN) 300 MG capsule    melatonin 3 MG CAPS    multivitamin, therapeutic (THERA-VIT) TABS tablet    olopatadine (PATADAY) 0.2 % ophthalmic solution    QUEtiapine (SEROQUEL) 50 MG tablet    valACYclovir (VALTREX) 1000 mg tablet    zolpidem (AMBIEN) 10 MG tablet     No current facility-administered medications for this visit.      Past Medical History:   Patient Active Problem List   Diagnosis    Dry eyes    Palpitations    Insomnia    Cold sore    Bipolar disorder (H)    Cervical radiculopathy    Red ear syndrome     Past Medical History:   Diagnosis Date    Bipolar affective disorder in remission (H24)     Recurrent cold sores     Varicella        CC Mark Anthony Blue MD  [address] [address]  [place] [address] on close of this encounter.

## 2024-02-22 NOTE — PATIENT INSTRUCTIONS

## 2024-02-22 NOTE — NURSING NOTE
Dermatology Rooming Note    Justo Cardoza's goals for this visit include:   Chief Complaint   Patient presents with    Skin Check     Justo is here today for a skin check      ESTEPHANIE Gonzalez

## 2024-03-15 PROBLEM — Z12.83 SKIN CANCER SCREENING: Status: ACTIVE | Noted: 2024-03-15

## 2024-03-15 PROBLEM — D22.9 MULTIPLE BENIGN NEVI: Status: ACTIVE | Noted: 2024-03-15

## 2024-03-15 PROBLEM — L81.4 SOLAR LENTIGO: Status: ACTIVE | Noted: 2024-03-15

## 2024-03-21 ENCOUNTER — PATIENT OUTREACH (OUTPATIENT)
Dept: CARE COORDINATION | Facility: CLINIC | Age: 39
End: 2024-03-21
Payer: COMMERCIAL

## 2024-04-04 ENCOUNTER — PATIENT OUTREACH (OUTPATIENT)
Dept: CARE COORDINATION | Facility: CLINIC | Age: 39
End: 2024-04-04
Payer: COMMERCIAL

## 2024-04-06 ENCOUNTER — OFFICE VISIT (OUTPATIENT)
Dept: URGENT CARE | Facility: URGENT CARE | Age: 39
End: 2024-04-06
Payer: COMMERCIAL

## 2024-04-06 VITALS
HEART RATE: 53 BPM | DIASTOLIC BLOOD PRESSURE: 71 MMHG | TEMPERATURE: 97.2 F | SYSTOLIC BLOOD PRESSURE: 117 MMHG | RESPIRATION RATE: 15 BRPM | OXYGEN SATURATION: 100 %

## 2024-04-06 DIAGNOSIS — J02.9 SORE THROAT: Primary | ICD-10-CM

## 2024-04-06 DIAGNOSIS — H60.391 INFECTIVE OTITIS EXTERNA, RIGHT: ICD-10-CM

## 2024-04-06 DIAGNOSIS — M26.629 TMJ PAIN DYSFUNCTION SYNDROME: ICD-10-CM

## 2024-04-06 LAB — DEPRECATED S PYO AG THROAT QL EIA: NEGATIVE

## 2024-04-06 PROCEDURE — 87651 STREP A DNA AMP PROBE: CPT

## 2024-04-06 PROCEDURE — 99213 OFFICE O/P EST LOW 20 MIN: CPT

## 2024-04-06 RX ORDER — OFLOXACIN 3 MG/ML
5 SOLUTION AURICULAR (OTIC) DAILY
Qty: 10 ML | Refills: 0 | Status: SHIPPED | OUTPATIENT
Start: 2024-04-06 | End: 2024-05-02

## 2024-04-06 ASSESSMENT — PAIN SCALES - GENERAL: PAINLEVEL: EXTREME PAIN (8)

## 2024-04-06 NOTE — PROGRESS NOTES
Assessment & Plan       ICD-10-CM    1. Sore throat  J02.9 Streptococcus A Rapid Screen w/Reflex to PCR - Clinic Collect     Group A Streptococcus PCR Throat Swab      2. TMJ pain dysfunction syndrome  M26.629 Adult ENT  Referral     Dental Referral      3. Infective otitis externa, right  H60.391 ofloxacin (FLOXIN) 0.3 % otic solution         Exam consistent with otitis externa, will treat. Pain in front of ear is directly over the TMJ in the setting of chronic bruxism and nightguard actually making it worse. I sent referrals to both ENT (for diagnosis and possibly treatment) and dental (for definitive treatment). I was unable to place a referral to a known TMJ specialist as there isn't one in this system that I know of. But if he has any trouble getting treatment through these referrals, we should call in a referral to a TMJ specialist in the [facility].     Follow up with primary care provider with any problems, questions or concerns or if symptoms worsen or fail to improve. Patient agreed to plan and verbalized understanding.     Glen Kemp is a 38 year old male who presents to clinic today for the following health issues:  Chief Complaint   Patient presents with    Otalgia     Ear Pain on Right side and jaw pain     HPI    Pain in ear and wears a mouthguard. Does have a sore throat - no cough, no fever.     Review of Systems    10 point ROS performed and negative except as noted in HPI.     Problem List:  2024-03: Multiple benign nevi  2024-03: Skin cancer screening  2024-03: Solar lentigo  2023-08: Red ear syndrome  2023-05: Neuroforaminal stenosis of cervical spine  2023-05: Upper back pain  2023-05: Cervical radiculopathy  2023-05: Left arm weakness  2023-05: Neck pain  2023-04: Palpitations  2023-04: Chest pain, unspecified type  2023-04: Cold sore  2023-04: Bipolar disorder (H)  2022-06: Dry eyes  2018-03: Pain of left hand  2018-03: Closed fracture of metacarpal bone  2017-09: Lateral  epicondylitis of right elbow  2015-07: Pain in joint involving ankle and foot, right  2015-07: Edema  2015-07: Right ankle pain  1983-08: Insomnia      Past Medical History:   Diagnosis Date    Bipolar affective disorder in remission (H24)     Recurrent cold sores     Varicella        Social History     Tobacco Use    Smoking status: Never    Smokeless tobacco: Never   Substance Use Topics    Alcohol use: Yes     Types: 4 Standard drinks or equivalent per week     Comment: 2-3 days a week           Objective    /71 (BP Location: Right arm, Patient Position: Sitting, Cuff Size: Adult Regular)   Pulse 53   Temp 97.2  F (36.2  C) (Temporal)   Resp 15   SpO2 100%   Physical Exam   Constitutional:       General: Patient is not in acute distress.     Appearance: Normal appearance.   HENT:      Head: Normocephalic and atraumatic.      Right Ear: External ear normal. TM normal in appearance, canal erythematous.      Left Ear: External ear normal.      Nose: No congestion or rhinorrhea.      Mouth/Throat:      Mouth: Mucous membranes are moist.      Pharynx: Oropharynx is clear. No oropharyngeal exudate.   Eyes:      General: No scleral icterus.     Extraocular Movements: Extraocular movements intact.      Conjunctiva/sclera: Conjunctivae normal.      Pupils: Pupils are equal, round, and reactive to light.   Pulmonary:      Effort: Pulmonary effort is normal.   Cardiovascular:      Regular heart rate  Abdominal:      General: Abdomen is flat.   Musculoskeletal:         General: No swelling or deformity. Normal range of motion. Tenderness over TMJ.     Cervical back: Normal range of motion and neck supple.   Skin:     General: Skin is warm and dry.      Coloration: Skin is not jaundiced.      Findings: No bruising, lesion or rash.   Neurological:      General: No focal deficit present.      Mental Status: Patient is alert. Mental status is at baseline.   Psychiatric:         Mood and Affect: Mood normal.          Behavior: Behavior normal.         Thought Content: Thought content normal.     Whit Mo MD

## 2024-04-07 LAB — GROUP A STREP BY PCR: NOT DETECTED

## 2024-04-25 SDOH — HEALTH STABILITY: PHYSICAL HEALTH: ON AVERAGE, HOW MANY MINUTES DO YOU ENGAGE IN EXERCISE AT THIS LEVEL?: 30 MIN

## 2024-04-25 SDOH — HEALTH STABILITY: PHYSICAL HEALTH: ON AVERAGE, HOW MANY DAYS PER WEEK DO YOU ENGAGE IN MODERATE TO STRENUOUS EXERCISE (LIKE A BRISK WALK)?: 5 DAYS

## 2024-04-25 ASSESSMENT — SOCIAL DETERMINANTS OF HEALTH (SDOH): HOW OFTEN DO YOU GET TOGETHER WITH FRIENDS OR RELATIVES?: MORE THAN THREE TIMES A WEEK

## 2024-05-02 ENCOUNTER — OFFICE VISIT (OUTPATIENT)
Dept: FAMILY MEDICINE | Facility: CLINIC | Age: 39
End: 2024-05-02
Payer: COMMERCIAL

## 2024-05-02 VITALS
HEIGHT: 70 IN | BODY MASS INDEX: 25.27 KG/M2 | TEMPERATURE: 98.7 F | SYSTOLIC BLOOD PRESSURE: 128 MMHG | HEART RATE: 65 BPM | WEIGHT: 176.5 LBS | DIASTOLIC BLOOD PRESSURE: 73 MMHG | OXYGEN SATURATION: 98 % | RESPIRATION RATE: 10 BRPM

## 2024-05-02 DIAGNOSIS — M50.30 DDD (DEGENERATIVE DISC DISEASE), CERVICAL: ICD-10-CM

## 2024-05-02 DIAGNOSIS — Z00.00 VISIT FOR PREVENTIVE HEALTH EXAMINATION: Primary | ICD-10-CM

## 2024-05-02 DIAGNOSIS — Z13.220 LIPID SCREENING: ICD-10-CM

## 2024-05-02 DIAGNOSIS — F31.9 BIPOLAR AFFECTIVE DISORDER, REMISSION STATUS UNSPECIFIED (H): ICD-10-CM

## 2024-05-02 DIAGNOSIS — Z82.69 FAMILY HISTORY OF GOUT: ICD-10-CM

## 2024-05-02 DIAGNOSIS — Z13.1 SCREENING FOR DIABETES MELLITUS: ICD-10-CM

## 2024-05-02 DIAGNOSIS — Z71.84 TRAVEL ADVICE ENCOUNTER: ICD-10-CM

## 2024-05-02 PROCEDURE — 99396 PREV VISIT EST AGE 40-64: CPT | Performed by: FAMILY MEDICINE

## 2024-05-02 PROCEDURE — 99213 OFFICE O/P EST LOW 20 MIN: CPT | Mod: 25 | Performed by: FAMILY MEDICINE

## 2024-05-02 RX ORDER — GABAPENTIN 300 MG/1
300 CAPSULE ORAL 2 TIMES DAILY PRN
Qty: 60 CAPSULE | Refills: 2 | Status: SHIPPED | OUTPATIENT
Start: 2024-05-02

## 2024-05-02 RX ORDER — ATOVAQUONE AND PROGUANIL HYDROCHLORIDE 250; 100 MG/1; MG/1
1 TABLET, FILM COATED ORAL DAILY
Qty: 30 TABLET | Refills: 0 | Status: SHIPPED | OUTPATIENT
Start: 2024-05-02

## 2024-05-02 ASSESSMENT — PAIN SCALES - GENERAL: PAINLEVEL: MILD PAIN (2)

## 2024-05-02 NOTE — PROGRESS NOTES
Preventive Care Visit  [facility]  Mark Anthony Blue MD, Family Medicine  May 2, 2024  {Provider  Link to [facility] :[id number]}    {PROVIDER CHARTING PREFERENCE:598961}    Glen Kemp is a 38 year old, presenting for the following:  Physical (Rx for malaria for travel/Family hx of gout- would like blood tests to check )        5/2/2024     3:30 PM   Additional Questions   Roomed by Ludmila Osei        Health Care Directive  Patient has a Health Care Directive on file  {(AWV REQUIRED) Advanced Care Planning Reviewed:626825}    HPI  ***  {MA/LPN/RN Pre-Provider Visit Orders- hCG/UA/Strep (Optional):676501}  {SUPERLIST (Optional):730956}  {additonal problems for provider to add (Optional):978659}      4/25/2024   General Health   How would you rate your overall physical health? Good   Feel stress (tense, anxious, or unable to sleep) Very much   (!) STRESS CONCERN      4/25/2024   Nutrition   Three or more servings of calcium each day? Yes   Diet: Regular (no restrictions)   How many servings of fruit and vegetables per day? (!) 2-3   How many sweetened beverages each day? 0-1         4/25/2024   Exercise   Days per week of moderate/strenous exercise 5 days   Average minutes spent exercising at this level 30 min         4/25/2024   Social Factors   Frequency of gathering with friends or relatives More than three times a week   Worry food won't last until get money to buy more No   Food not last or not have enough money for food? No   Do you have housing?  Yes   Are you worried about losing your housing? No   Lack of transportation? No   Unable to get utilities (heat,electricity)? No         4/25/2024   Dental   Dentist two times every year? Yes         4/25/2024   TB Screening   Were you born outside of the [place]? No         Today's PHQ-2 Score:       5/2/2024     3:15 PM   PHQ-2 ( 1999 [employer])   Q1: Little interest or pleasure in doing things 0   Q2: Feeling down, depressed or  "hopeless 0   PHQ-2 Score 0   Q1: Little interest or pleasure in doing things Not at all   Q2: Feeling down, depressed or hopeless Not at all   PHQ-2 Score 0           4/25/2024   Substance Use   Alcohol more than 3/day or more than 7/wk No   Do you use any other substances recreationally? No     Social History     Tobacco Use    Smoking status: Never    Smokeless tobacco: Never   Vaping Use    Vaping status: Never Used   Substance Use Topics    Alcohol use: Yes     Types: 4 Standard drinks or equivalent per week     Comment: 2-3 days a week    Drug use: No     {Provider  If there are gaps in the social history shown above, please follow the link to update and then refresh the note Link to Social and Substance History :035600}      4/25/2024   STI Screening   New sexual partner(s) since last STI/HIV test? No   ASCVD Risk   The 10-year ASCVD risk score (Fela RAMIREZ, et al., 2019) is: 0.5%    Values used to calculate the score:      Age: 38 years      Sex: Male      Is Non- : No      Diabetic: No      Tobacco smoker: No      Systolic Blood Pressure: 128 mmHg      Is BP treated: No      HDL Cholesterol: 90 mg/dL      Total Cholesterol: 209 mg/dL        4/25/2024   Contraception/Family Planning   Questions about contraception or family planning No     {Provider  Use the storyboard to review patient history, after sections have been marked as reviewed, refresh note to capture documentation:787543}   Reviewed and updated as needed this visit by Provider                    {HISTORY OPTIONS (Optional):127383}    {ROS Picklists (Optional):067941}     Objective    Exam  /73   Pulse 65   Temp 98.7  F (37.1  C) (Temporal)   Resp 10   Ht 1.77 m (5' 9.69\")   Wt 80.1 kg (176 lb 8 oz)   SpO2 98%   BMI 25.55 kg/m     Estimated body mass index is 25.55 kg/m  as calculated from the following:    Height as of this encounter: 1.77 m (5' 9.69\").    Weight as of this encounter: 80.1 kg (176 lb " 8 oz).    Physical Exam  {Exam Choices (Optional):273143}        Signed Electronically by: Mark Anthony Blue MD  {Email feedback regarding this note to [email or web address]   :194224}

## 2024-05-02 NOTE — PROGRESS NOTES
Assessment/Plan.    Preventive.  See below orders for screening tests and immunizations.    Family history of gout.  Reviewed with patient that checking uric acid may be low-yield given that he has not had any symptoms of gout.  He feels strongly that he would like to check this test, as he may implement diet changes as a result.    Cervical disc disease.  Continue gabapentin as needed.  Risk of sedation reviewed.    Travel advice encounter.  Will prescribe Malarone for malaria prophylaxis.  Discussed mosquito avoidance.  Discussed option of typhoid vaccine.  Patient will be leaving in less than 2 weeks, so he deferred typhoid vaccine at this time.  Discussed strategies for consuming safe foods.    Bipolar disorder. Well controlled.  Follow up with outside psychiatrist.    Next visit in 1 year for preventive.    Orders Placed This Encounter   Procedures    Uric acid    Hemoglobin A1c    Lipid panel reflex to direct LDL Fasting    Comprehensive metabolic panel (BMP + Alb, Alk Phos, ALT, AST, Total. Bili, TP)       ----  Chief complaint: Physical (Rx for malaria for travel/Family hx of gout- would like blood tests to check )    Social History     Social History Narrative    -Grew up in [place]    -Lives with wife, 2 kids (born around 2018, 2020)    -Works as professor of applied / development economics at [employer]    -Moving to [place] in 2024        Updated 5/5/2024     Patient has been advised of split billing.    Will be starting new job at [employer] in fall.    Patient would like uric acid test.  Maternal uncle has gout.  Brother has elevated uric acid level.  Patient denies personal history of gout.  No history of kidney stone.    Patient would like monitoring labs for quetiapine.  Dose was recently decreased to 25 mg.  Followed by [facility].    Cervical DDD.  Had injection in past.  Now using traction pillow recommended by physical therapy.  Upper extremity weakness has resolved.   "Occasional pain.  Uses gabapentin most nights.    Upcoming trip to [facility] for work.  Has tolerated Malarone prophylaxis in past.  Has received yellow fever vaccine.    Exam  /73   Pulse 65   Temp 98.7  F (37.1  C) (Temporal)   Resp 10   Ht 1.77 m (5' 9.69\")   Wt 80.1 kg (176 lb 8 oz)   SpO2 98%   BMI 25.55 kg/m      oropharynx without abnormal masses  lung fields CTAB  heart with regular rate and rhythm, no murmurs  no lower leg edema  "

## 2024-05-05 PROBLEM — R00.2 PALPITATIONS: Status: RESOLVED | Noted: 2023-04-22 | Resolved: 2024-05-05

## 2024-05-05 PROBLEM — M54.12 CERVICAL RADICULOPATHY: Status: ACTIVE | Noted: 2023-05-23

## 2024-05-05 PROBLEM — Z12.83 SKIN CANCER SCREENING: Status: RESOLVED | Noted: 2024-03-15 | Resolved: 2024-05-05

## 2024-05-05 RX ORDER — QUETIAPINE FUMARATE 25 MG/1
25 TABLET, FILM COATED ORAL AT BEDTIME
COMMUNITY
Start: 2024-04-02

## 2024-05-06 ENCOUNTER — LAB (OUTPATIENT)
Dept: LAB | Facility: CLINIC | Age: 39
End: 2024-05-06
Payer: COMMERCIAL

## 2024-05-06 DIAGNOSIS — Z13.1 SCREENING FOR DIABETES MELLITUS: ICD-10-CM

## 2024-05-06 DIAGNOSIS — Z13.220 LIPID SCREENING: ICD-10-CM

## 2024-05-06 DIAGNOSIS — Z82.69 FAMILY HISTORY OF GOUT: ICD-10-CM

## 2024-05-06 LAB — HBA1C MFR BLD: 5 % (ref 0–5.6)

## 2024-05-06 PROCEDURE — 84550 ASSAY OF BLOOD/URIC ACID: CPT

## 2024-05-06 PROCEDURE — 80061 LIPID PANEL: CPT

## 2024-05-06 PROCEDURE — 36415 COLL VENOUS BLD VENIPUNCTURE: CPT

## 2024-05-06 PROCEDURE — 83036 HEMOGLOBIN GLYCOSYLATED A1C: CPT

## 2024-05-06 PROCEDURE — 80053 COMPREHEN METABOLIC PANEL: CPT

## 2024-05-07 ENCOUNTER — OFFICE VISIT (OUTPATIENT)
Dept: OPTOMETRY | Facility: CLINIC | Age: 39
End: 2024-05-07
Payer: COMMERCIAL

## 2024-05-07 DIAGNOSIS — H52.13 MYOPIA OF BOTH EYES: ICD-10-CM

## 2024-05-07 DIAGNOSIS — H02.889 MEIBOMIAN GLAND DYSFUNCTION: ICD-10-CM

## 2024-05-07 DIAGNOSIS — H52.223 REGULAR ASTIGMATISM OF BOTH EYES: ICD-10-CM

## 2024-05-07 DIAGNOSIS — Z01.00 EXAMINATION OF EYES AND VISION: Primary | ICD-10-CM

## 2024-05-07 DIAGNOSIS — H04.123 DRY EYE SYNDROME OF BOTH EYES: ICD-10-CM

## 2024-05-07 LAB
ALBUMIN SERPL BCG-MCNC: 4.9 G/DL (ref 3.5–5.2)
ALP SERPL-CCNC: 83 U/L (ref 40–150)
ALT SERPL W P-5'-P-CCNC: 27 U/L (ref 0–70)
ANION GAP SERPL CALCULATED.3IONS-SCNC: 10 MMOL/L (ref 7–15)
AST SERPL W P-5'-P-CCNC: 31 U/L (ref 0–45)
BILIRUB SERPL-MCNC: 0.4 MG/DL
BUN SERPL-MCNC: 23.2 MG/DL (ref 6–20)
CALCIUM SERPL-MCNC: 9.5 MG/DL (ref 8.6–10)
CHLORIDE SERPL-SCNC: 103 MMOL/L (ref 98–107)
CHOLEST SERPL-MCNC: 157 MG/DL
CREAT SERPL-MCNC: 1.43 MG/DL (ref 0.67–1.17)
DEPRECATED HCO3 PLAS-SCNC: 27 MMOL/L (ref 22–29)
EGFRCR SERPLBLD CKD-EPI 2021: 64 ML/MIN/1.73M2
FASTING STATUS PATIENT QL REPORTED: YES
GLUCOSE SERPL-MCNC: 101 MG/DL (ref 70–99)
HDLC SERPL-MCNC: 57 MG/DL
LDLC SERPL CALC-MCNC: 86 MG/DL
NONHDLC SERPL-MCNC: 100 MG/DL
POTASSIUM SERPL-SCNC: 4.5 MMOL/L (ref 3.4–5.3)
PROT SERPL-MCNC: 7.6 G/DL (ref 6.4–8.3)
SODIUM SERPL-SCNC: 140 MMOL/L (ref 135–145)
TRIGL SERPL-MCNC: 70 MG/DL
URATE SERPL-MCNC: 6.2 MG/DL (ref 3.4–7)

## 2024-05-07 PROCEDURE — 92015 DETERMINE REFRACTIVE STATE: CPT | Performed by: OPTOMETRIST

## 2024-05-07 PROCEDURE — 92310 CONTACT LENS FITTING OU: CPT | Mod: GA | Performed by: OPTOMETRIST

## 2024-05-07 PROCEDURE — 92014 COMPRE OPH EXAM EST PT 1/>: CPT | Performed by: OPTOMETRIST

## 2024-05-07 RX ORDER — AZELASTINE HYDROCHLORIDE 0.5 MG/ML
1 SOLUTION/ DROPS OPHTHALMIC 2 TIMES DAILY PRN
Qty: 6 ML | Refills: 11 | Status: CANCELLED | OUTPATIENT
Start: 2024-05-07 | End: 2025-05-07

## 2024-05-07 RX ORDER — LIFITEGRAST 50 MG/ML
1 SOLUTION/ DROPS OPHTHALMIC 2 TIMES DAILY
Qty: 12 EACH | Refills: 11 | Status: SHIPPED | OUTPATIENT
Start: 2024-05-07 | End: 2025-05-07

## 2024-05-07 ASSESSMENT — REFRACTION_CURRENTRX
OS_BRAND: ALCON DAILIES TOTAL 1 BC 8.5, D 14.1
OS_DIAMETER: 14.0
OD_SPHERE: -4.25
OS_BASECURVE: 8.6
OD_SPHERE: -4.25
OS_SPHERE: -4.25
OD_BRAND: PUREVISION 2
OS_BRAND: PUREVISION 2
OD_BRAND: ALCON DAILIES TOTAL 1 BC 8.5, D 14.1
OD_BASECURVE: 8.6
OS_SPHERE: -4.25
OD_DIAMETER: 14.0

## 2024-05-07 ASSESSMENT — CONF VISUAL FIELD
OD_INFERIOR_NASAL_RESTRICTION: 0
OS_SUPERIOR_TEMPORAL_RESTRICTION: 0
OS_NORMAL: 1
OD_SUPERIOR_TEMPORAL_RESTRICTION: 0
OS_INFERIOR_NASAL_RESTRICTION: 0
OD_SUPERIOR_NASAL_RESTRICTION: 0
OS_INFERIOR_TEMPORAL_RESTRICTION: 0
OS_SUPERIOR_NASAL_RESTRICTION: 0
OD_INFERIOR_TEMPORAL_RESTRICTION: 0
OD_NORMAL: 1

## 2024-05-07 ASSESSMENT — VISUAL ACUITY
OS_CC: 20/20
METHOD: SNELLEN - LINEAR
OS_CC: 20/20
OS_CC+: -1
OS_SC: 20/400-
OD_CC: 20/20
OD_CC: 20/20
CORRECTION_TYPE: GLASSES
OD_SC: 20/400-

## 2024-05-07 ASSESSMENT — EXTERNAL EXAM - RIGHT EYE: OD_EXAM: NORMAL

## 2024-05-07 ASSESSMENT — REFRACTION_WEARINGRX
OD_AXIS: 060
OS_SPHERE: -5.25
OD_CYLINDER: +0.75
OS_CYLINDER: +0.75
OS_AXIS: 135
OD_SPHERE: -5.00

## 2024-05-07 ASSESSMENT — KERATOMETRY
OS_K1POWER_DIOPTERS: 44.75
OS_AXISANGLE2_DEGREES: 017
OD_AXISANGLE2_DEGREES: 162
OS_K2POWER_DIOPTERS: 45.75
OD_K2POWER_DIOPTERS: 46.00
OS_AXISANGLE_DEGREES: 107
OD_K1POWER_DIOPTERS: 44.75
OD_AXISANGLE_DEGREES: 072

## 2024-05-07 ASSESSMENT — TONOMETRY
IOP_METHOD: TONOPEN
OS_IOP_MMHG: 18
OD_IOP_MMHG: 16

## 2024-05-07 ASSESSMENT — EXTERNAL EXAM - LEFT EYE: OS_EXAM: NORMAL

## 2024-05-07 ASSESSMENT — SLIT LAMP EXAM - LIDS
COMMENTS: MEIBOMIAN GLAND DYSFUNCTION, COLLARETTES
COMMENTS: MEIBOMIAN GLAND DYSFUNCTION, COLLARETTES

## 2024-05-07 ASSESSMENT — CUP TO DISC RATIO
OS_RATIO: 0.55
OD_RATIO: 0.55

## 2024-05-07 ASSESSMENT — REFRACTION_MANIFEST
OS_AXIS: 135
OD_AXIS: 060
OD_CYLINDER: +0.75
OS_CYLINDER: +0.75
OS_SPHERE: -5.25
OD_SPHERE: -5.00

## 2024-05-07 NOTE — PATIENT INSTRUCTIONS
Eyeglass prescription given.    Contact lens prescription given and form signed.    Heat to the eyes daily for 10-15 minutes nightly with warm washcloth or reusable gel masks from the pharmacy or  [employer] heat masks can be purchased at [employer].    Tea tree oil wipes or foam to cleanse eyelids/lashes in am and pm.    Xiidra- (Liftegras ophthalmic solution 5 %) 1 drop both eyes once in am and once in pm.    Non preserved artificial tears- 1 drop both eyes 2-4 x day.    Return in 1 year for a complete eye exam or sooner if needed.    Nito Alfredo, OD    The affects of the dilating drops last for 4- 6 hours.  You will be more sensitive to light and vision will be blurry up close.  Do not drive if you do not feel comfortable.  Mydriatic sunglasses were given if needed.      Optometry Providers       Clinic Locations                                 Telephone Number   Dr. Collette Butler [facility] [phone]     [facility] Hours:                [facility] Hours:       [facility] Hours:   [address] [facility] [address]   [address]     [address] [place] [address]   [place] [address]    [place] [address]  Phone: [phone]                    Phone: [phone]     Phone: [phone]                      Monday 8:00-6:00                          Monday 8:00-6:00                          Monday 8:00-6:00              Tuesday 8:00-6:00                          Tuesday 8:00-6:00                          Tuesday 8:00-6:00              Wednesday 8:00-6:00                  Wednesday 8:00-6:00                   Wednesday 8:00-6:00      Thursday 8:00-6:00                        Thursday 8:00-6:00                         Thursday 8:00-6:00            Friday 8:00-5:00                              Friday 8:00-5:00                               Friday 8:00-5:00    [facility] Hours:   [address] [address]  [phone]    Monday 9:00-6:00  Tuesday 9:00-6:00  Wednesday 9:00-6:00  Thursday 9:00-6:00  Friday 9:00-5:00  As always, Thank you for trusting us with your health care needs!

## 2024-05-07 NOTE — PROGRESS NOTES
Chief Complaint   Patient presents with    Annual Eye Exam         Last Eye Exam: 1-6-2022  Dilated Previously: Yes    What are you currently using to see?  glasses     Distance Vision Acuity: Satisfied with vision    Near Vision Acuity: Satisfied with vision while reading  with glasses    Eye Comfort: dry- does not feel his allergies affect his eyes- more dryness  Do you use eye drops? : Yes: systane ultra, does not think pataday works well (he is surprised it is an allergy drop)  Occupation or Hobbies: professor at the     Decided at last minute to get contact lens fitting today.    Lauryn Farias Optometric Assistant, A.B.O.C.      Medical, surgical and family histories reviewed and updated 5/7/2024.       OBJECTIVE: See Ophthalmology exam    ASSESSMENT:    ICD-10-CM    1. Examination of eyes and vision  Z01.00 EYE EXAM (SIMPLE-NONBILLABLE)      2. Myopia of both eyes  H52.13 REFRACTION     CONTACT LENS FITTING,BILAT w/ signed waiver      3. Regular astigmatism of both eyes  H52.223 REFRACTION      4. Dry eye syndrome of both eyes  H04.123 EYE EXAM (SIMPLE-NONBILLABLE)     lifitegrast (XIIDRA) 5 % opthalmic solution      5. Meibomian gland dysfunction  H02.889 EYE EXAM (SIMPLE-NONBILLABLE)          PLAN:     Patient Instructions   Eyeglass prescription given.    Contact lens prescription given and form signed.    Heat to the eyes daily for 10-15 minutes nightly with warm washcloth or reusable gel masks from the pharmacy or  [employer] heat masks can be purchased at [employer].    Tea tree oil wipes or foam to cleanse eyelids/lashes in am and pm.    Xiidra- (Liftegras ophthalmic solution 5 %) 1 drop both eyes once in am and once in pm.    Non preserved artificial tears- 1 drop both eyes 2-4 x day.    Return in 1 year for a complete eye exam or sooner if needed.    Nito Alfredo, OD

## 2024-05-07 NOTE — LETTER
5/7/2024         RE: Justo Cardoza  [address] [facility] [address]        Dear Colleague,    Thank you for referring your patient, Justo Cardoza, to the [facility]. Please see a copy of my visit note below.    Chief Complaint   Patient presents with     Annual Eye Exam         Last Eye Exam: 1-6-2022  Dilated Previously: Yes    What are you currently using to see?  glasses     Distance Vision Acuity: Satisfied with vision    Near Vision Acuity: Satisfied with vision while reading  with glasses    Eye Comfort: dry- does not feel his allergies affect his eyes- more dryness  Do you use eye drops? : Yes: systane ultra, does not think pataday works well (he is surprised it is an allergy drop)  Occupation or Hobbies: professor at the     Decided at last minute to get contact lens fitting today.    Lauryn Farias Optometric Assistant, A.B.O.C.      Medical, surgical and family histories reviewed and updated 5/7/2024.       OBJECTIVE: See Ophthalmology exam    ASSESSMENT:    ICD-10-CM    1. Examination of eyes and vision  Z01.00 EYE EXAM (SIMPLE-NONBILLABLE)      2. Myopia of both eyes  H52.13 REFRACTION     CONTACT LENS FITTING,BILAT w/ signed waiver      3. Regular astigmatism of both eyes  H52.223 REFRACTION      4. Dry eye syndrome of both eyes  H04.123 EYE EXAM (SIMPLE-NONBILLABLE)     lifitegrast (XIIDRA) 5 % opthalmic solution      5. Meibomian gland dysfunction  H02.889 EYE EXAM (SIMPLE-NONBILLABLE)          PLAN:     Patient Instructions   Eyeglass prescription given.    Contact lens prescription given and form signed.    Heat to the eyes daily for 10-15 minutes nightly with warm washcloth or reusable gel masks from the pharmacy or  [employer] heat masks can be purchased at [employer].    Tea tree oil wipes or foam to cleanse eyelids/lashes in am and pm.    Xiidra- (Liftegras ophthalmic solution 5 %) 1 drop both eyes once in am and once in pm.    Non preserved artificial tears-  1 drop both eyes 2-4 x day.    Return in 1 year for a complete eye exam or sooner if needed.    Nito Alfredo, OD             Again, thank you for allowing me to participate in the care of your patient.        Sincerely,        Nito Alfredo, OD

## 2024-08-25 ENCOUNTER — MYC MEDICAL ADVICE (OUTPATIENT)
Dept: FAMILY MEDICINE | Facility: CLINIC | Age: 39
End: 2024-08-25
Payer: COMMERCIAL

## 2024-08-25 DIAGNOSIS — B00.1 RECURRENT COLD SORES: ICD-10-CM

## 2024-08-26 RX ORDER — VALACYCLOVIR HYDROCHLORIDE 1 G/1
TABLET, FILM COATED ORAL
Qty: 12 TABLET | Refills: 2 | Status: SHIPPED | OUTPATIENT
Start: 2024-08-26

## 2024-08-26 NOTE — TELEPHONE ENCOUNTER
Pharmacy change and refill sent for Valacyclovir Hcl 1gm Tab to [facility]     Order pended     Lexie TURNER RN  [facility]

## 2025-03-17 ENCOUNTER — TELEPHONE (OUTPATIENT)
Dept: DERMATOLOGY | Facility: CLINIC | Age: 40
End: 2025-03-17
Payer: COMMERCIAL

## 2025-03-17 NOTE — TELEPHONE ENCOUNTER
3/17 Left Voicemail (1st Attempt) and Sent Mychart (1st Attempt) for the patient to call back and schedule the following:    Appointment type: Return Dermatology  Provider: Dariel  Return date: 2/24/2026  Specialty phone number: [phone]  Additional appointment(s) needed: n/a  Additonal Notes: n/a

## (undated) DEVICE — TRAY PAIN INJECTION 97A 640

## (undated) DEVICE — NDL EPIDURAL TUOHY 20GA 3.5" 405028

## (undated) DEVICE — SYR 10ML PERFIX LL 332152

## (undated) DEVICE — GLOVE PROTEXIS POWDER FREE SMT 7.0  2D72PT70X

## (undated) DEVICE — TUBING EXTENSION SET MICROBORE 6" LL 2N1194

## (undated) DEVICE — LINEN TOWEL PACK X5 5464

## (undated) DEVICE — PREP CHLORAPREP W/ORANGE TINT 10.5ML 260715

## (undated) RX ORDER — LIDOCAINE HYDROCHLORIDE AND EPINEPHRINE 10; 10 MG/ML; UG/ML
INJECTION, SOLUTION INFILTRATION; PERINEURAL
Status: DISPENSED
Start: 2019-10-23